# Patient Record
Sex: FEMALE | Race: WHITE | NOT HISPANIC OR LATINO | Employment: PART TIME | URBAN - METROPOLITAN AREA
[De-identification: names, ages, dates, MRNs, and addresses within clinical notes are randomized per-mention and may not be internally consistent; named-entity substitution may affect disease eponyms.]

---

## 2018-01-14 ENCOUNTER — HOSPITAL ENCOUNTER (EMERGENCY)
Facility: CLINIC | Age: 63
Discharge: HOME | End: 2018-01-14
Attending: PHYSICIAN ASSISTANT | Admitting: PHYSICIAN ASSISTANT
Payer: OTHER MISCELLANEOUS

## 2018-01-14 ENCOUNTER — APPOINTMENT (OUTPATIENT)
Dept: GENERAL RADIOLOGY | Facility: CLINIC | Age: 63
End: 2018-01-14
Attending: PHYSICIAN ASSISTANT
Payer: OTHER MISCELLANEOUS

## 2018-01-14 VITALS
OXYGEN SATURATION: 95 % | BODY MASS INDEX: 26.22 KG/M2 | WEIGHT: 148 LBS | HEART RATE: 90 BPM | TEMPERATURE: 98.3 F | SYSTOLIC BLOOD PRESSURE: 170 MMHG | DIASTOLIC BLOOD PRESSURE: 109 MMHG

## 2018-01-14 DIAGNOSIS — S52.181A OTHER CLOSED FRACTURE OF PROXIMAL END OF RIGHT RADIUS, INITIAL ENCOUNTER: ICD-10-CM

## 2018-01-14 PROCEDURE — G0463 HOSPITAL OUTPT CLINIC VISIT: HCPCS | Mod: 25

## 2018-01-14 PROCEDURE — 99213 OFFICE O/P EST LOW 20 MIN: CPT | Mod: 25 | Performed by: PHYSICIAN ASSISTANT

## 2018-01-14 PROCEDURE — 24650 CLTX RDL HEAD/NCK FX WO MNPJ: CPT | Mod: 54 | Performed by: PHYSICIAN ASSISTANT

## 2018-01-14 PROCEDURE — 73070 X-RAY EXAM OF ELBOW: CPT | Mod: RT

## 2018-01-14 PROCEDURE — 24650 CLTX RDL HEAD/NCK FX WO MNPJ: CPT

## 2018-01-14 NOTE — DISCHARGE INSTRUCTIONS
Upper Extremity Fracture    You have a break (fracture) of the arm, wrist, or hand. This may be a small crack in the bone. Or it may be a major break, with the broken parts pushed out of position. Most fractures will heal without surgery. But you may need surgery if the bones are far out of place or if the break is near the elbow. Treatment is with a special sling called a shoulder immobilizer, or a splint or cast, depending on the type of fracture and where the fracture is. This fracture takes 4 to 6 weeks or longer to heal. The cast may need to be changed in 2 to 3 weeks as swelling goes down.  Home care  Follow these guidelines when caring for yourself:    If you were given a shoulder immobilizer, leave it in place. This will support the injured arm at your side. This is the best position for bone healing. The shoulder immobilizer can be adjusted. If it becomes loose, adjust it so that your forearm is level with the ground (horizontal). Your hand should be level with your elbow.    Put an ice pack on the injured area. Do this for 20 minutes every 1 to 2 hours the first day for pain relief. You can make an ice pack by wrapping a plastic bag of ice cubes in a thin towel. As the ice melts, be careful that the cast/splint/sling doesn t get wet. You can put the ice pack inside the sling and directly over the splint or cast. Continue using the ice pack 3 to 4 times a day for the next 2 days. Then use the ice pack as needed to ease pain and swelling.    Keep the cast, splint, or sling completely dry at all times. Bathe with your cast, splint, or sling out of the water. Protect it with a large plastic bag, rubber-banded at the top end. If a fiberglass cast, splint, or sling gets wet, you can dry it with a hair dryer.    You may use acetaminophen or ibuprofen to control pain, unless another pain medicine was prescribed. If you have chronic liver or kidney disease, talk with your healthcare provider before using these  medicines. Also talk with your provider if you ve had a stomach ulcer or gastrointestinal bleeding.    Don t put creams or objects under the cast if you have itching.  Follow-up care  Follow up with your healthcare provider, or as advised. This is to make sure the bone is healing the way it should.  X-rays may be taken. You will be told of any new findings that may affect your care.  When to seek medical advice  Call your health care provider right away if any of these occur:    The cast or splint cracks    The plaster cast or splint becomes wet or soft    The fiberglass cast or splint stays wet for more than 24 hours    Bad odor from the cast or wound fluid stains the cast    Tightness or pain under the cast or splint gets worse    Fingers become swollen, cold, blue, numb, or tingly    You can t move your fingers    Skin around cast or splint becomes red    Fever of 100.4 F (38 C) or higher, or as directed by your healthcare provider  Date Last Reviewed: 2/1/2017 2000-2017 [employer]. [address] [place] [address], [place] [address]. All rights reserved. This information is not intended as a substitute for professional medical care. Always follow your healthcare professional's instructions.

## 2018-01-14 NOTE — ED AVS SNAPSHOT
[facility]    [address] [address]    Phone:  [phone]    Fax:  [phone]                                       Dasia Reynolds   MRN: [id number]    Department:  [facility]   Date of Visit:  1/14/2018           Patient Information     Date Of Birth          1955        Your diagnoses for this visit were:     Other closed fracture of proximal end of right radius, initial encounter        You were seen by Darnell Crowley PA-C.        Discharge Instructions         Upper Extremity Fracture    You have a break (fracture) of the arm, wrist, or hand. This may be a small crack in the bone. Or it may be a major break, with the broken parts pushed out of position. Most fractures will heal without surgery. But you may need surgery if the bones are far out of place or if the break is near the elbow. Treatment is with a special sling called a shoulder immobilizer, or a splint or cast, depending on the type of fracture and where the fracture is. This fracture takes 4 to 6 weeks or longer to heal. The cast may need to be changed in 2 to 3 weeks as swelling goes down.  Home care  Follow these guidelines when caring for yourself:    If you were given a shoulder immobilizer, leave it in place. This will support the injured arm at your side. This is the best position for bone healing. The shoulder immobilizer can be adjusted. If it becomes loose, adjust it so that your forearm is level with the ground (horizontal). Your hand should be level with your elbow.    Put an ice pack on the injured area. Do this for 20 minutes every 1 to 2 hours the first day for pain relief. You can make an ice pack by wrapping a plastic bag of ice cubes in a thin towel. As the ice melts, be careful that the cast/splint/sling doesn t get wet. You can put the ice pack inside the sling and directly over the splint or cast. Continue using the ice pack 3 to 4 times a day for  the next 2 days. Then use the ice pack as needed to ease pain and swelling.    Keep the cast, splint, or sling completely dry at all times. Bathe with your cast, splint, or sling out of the water. Protect it with a large plastic bag, rubber-banded at the top end. If a fiberglass cast, splint, or sling gets wet, you can dry it with a hair dryer.    You may use acetaminophen or ibuprofen to control pain, unless another pain medicine was prescribed. If you have chronic liver or kidney disease, talk with your healthcare provider before using these medicines. Also talk with your provider if you ve had a stomach ulcer or gastrointestinal bleeding.    Don t put creams or objects under the cast if you have itching.  Follow-up care  Follow up with your healthcare provider, or as advised. This is to make sure the bone is healing the way it should.  X-rays may be taken. You will be told of any new findings that may affect your care.  When to seek medical advice  Call your health care provider right away if any of these occur:    The cast or splint cracks    The plaster cast or splint becomes wet or soft    The fiberglass cast or splint stays wet for more than 24 hours    Bad odor from the cast or wound fluid stains the cast    Tightness or pain under the cast or splint gets worse    Fingers become swollen, cold, blue, numb, or tingly    You can t move your fingers    Skin around cast or splint becomes red    Fever of 100.4 F (38 C) or higher, or as directed by your healthcare provider  Date Last Reviewed: 2/1/2017 2000-2017 [employer]. [address] [place] [address]. All rights reserved. This information is not intended as a substitute for professional medical care. Always follow your healthcare professional's instructions.          24 Hour Appointment Hotline       To make an appointment at any [facility], call [phone] ([phone]). If you don't have a family doctor or clinic, we  will help you find one. [facility] are conveniently located to serve the needs of you and your family.          ED Discharge Orders     ORTHO  REFERRAL       [facility] is referring you to the Orthopedic  Services at [facility].       The  Representative will assist you in the coordination of your Orthopedic and Musculoskeletal Care as prescribed by your physician.    The  Representative will call you within 1 business day to help schedule your appointment, or you may contact the  Representative at:    All areas ~ [phone]     Type of Referral : Non Surgical       Timeframe requested: 3 - 5 days    Coverage of these services is subject to the terms and limitations of your health insurance plan.  Please call member services at your health plan with any benefit or coverage questions.      If X-rays, CT or MRI's have been performed, please contact the facility where they were done to arrange for , prior to your scheduled appointment.  Please bring this referral request to your appointment and present it to your specialist.                     Review of your medicines      Our records show that you are taking the medicines listed below. If these are incorrect, please call your family doctor or clinic.        Dose / Directions Last dose taken    albuterol 108 (90 BASE) MCG/ACT Inhaler   Commonly known as:  PROAIR HFA/PROVENTIL HFA/VENTOLIN HFA   Dose:  2 puff   Quantity:  3 Inhaler        Inhale 2 puffs into the lungs every 6 hours as needed for shortness of breath / dyspnea.   Refills:  1        ANTIVERT 25 MG tablet   Dose:  12.5 mg   Quantity:  90 tablet   Generic drug:  meclizine        Take 0.5 tablets (12.5 mg) by mouth daily   Refills:  0        glucosamine complex Tabs        Take  by mouth.   Refills:  0        ipratropium - albuterol 0.5 mg/2.5 mg/3 mL 0.5-2.5 (3) MG/3ML neb solution   Commonly known as:   DUONEB   Dose:  1 ampule   Quantity:  30 vial        Take 3 mLs by nebulization every 6 hours as needed for shortness of breath / dyspnea.   Refills:  1        ipratropium 17 MCG/ACT Inhaler   Commonly known as:  ATROVENT HFA   Dose:  2 puff        Inhale 2 puffs into the lungs 2 times daily   Refills:  0        LORazepam 1 MG tablet   Commonly known as:  ATIVAN   Dose:  0.5 mg   Quantity:  4 tablet        Take 0.5 tablets by mouth. Up to 3 times daily, for torticolis.   Refills:  0        Multi-vitamin Tabs tablet   Dose:  1 tablet   Quantity:  100 tablet   Generic drug:  multivitamin, therapeutic with minerals        Take 1 tablet by mouth daily.   Refills:  3        omeprazole 20 MG tablet   Dose:  20 mg   Quantity:  30 tablet        Take 20 mg by mouth. 1-2 daily   Refills:  1        predniSONE 20 MG tablet   Commonly known as:  DELTASONE   Quantity:  10 tablet        Take two tablets (= 40mg) each day for 5 (five) days   Refills:  0        PREVACID PO        Take by mouth daily   Refills:  0        TYLENOL 325 MG tablet   Generic drug:  acetaminophen        2 TABLETS BY MOUTH EVERY 4 HOURS AS NEEDED, DO NOT EXCEED 4,000 MG OF ACETAMINOPHEN IN 24 HOURS   Refills:  0                Procedures and tests performed during your visit     Elbow XR, 2 views, right      Orders Needing Specimen Collection     None      Pending Results     No orders found from 1/12/2018 to 1/15/2018.            Pending Culture Results     No orders found from 1/12/2018 to 1/15/2018.            Pending Results Instructions     If you had any lab results that were not finalized at the time of your Discharge, you can call the ED Lab Result RN at [phone]. You will be contacted by this team for any positive Lab results or changes in treatment. The nurses are available 7 days a week from 10A to 6:30P.  You can leave a message 24 hours per day and they will return your call.        Test Results From Your Hospital Stay        1/14/2018  3:55  "PM      Narrative     ELBOW RIGHT TWO VIEW   2018 3:44 PM     HISTORY: Elbow pain from fall 4 days ago.     COMPARISON: None.        Impression     IMPRESSION: Fracture at the radial neck is suggested on the frontal  projection that is nondisplaced with some cortical irregularity in  this region. Positive posterior fat pad sign adjacent to the distal  humerus indicating hemarthrosis.    DAVID ATKINSON MD                Thank you for choosing [facility]       Thank you for choosing [facility] for your care. Our goal is always to provide you with excellent care. Hearing back from our patients is one way we can continue to improve our services. Please take a few minutes to complete the written survey that you may receive in the mail after you visit with us. Thank you!        [facility] [employer] Information     [facility] lets you send messages to your doctor, view your test results, renew your prescriptions, schedule appointments and more. To sign up, go to [email or web address] . Click on \"Log in\" on the left side of the screen, which will take you to the Welcome page. Then click on \"Sign up Now\" on the right side of the page.     You will be asked to enter the access code listed below, as well as some personal information. Please follow the directions to create your username and password.     Your access code is: [email or web address]  Expires: 2018  4:12 PM     Your access code will  in 90 days. If you need help or a new code, please call your [facility] clinic or [phone].        Care EveryWhere ID     This is your Care EveryWhere ID. This could be used by other organizations to access your [facility] medical records  [id number]        Equal Access to Services     ABDIAZIZ NOVAK : [facility] [place], waaxda ludawood, qaybta kaaltaylor morrison. So [facility] [phone].    ATENCIÓN: Si habla español, tiene a colorado disposición servicios gratuitos de asistencia lingüística. Llame al " [phone].    We comply with applicable federal civil rights laws and [place] laws. We do not discriminate on the basis of race, color, national origin, age, disability, sex, sexual orientation, or gender identity.            After Visit Summary       This is your record. Keep this with you and show to your community pharmacist(s) and doctor(s) at your next visit.

## 2018-01-14 NOTE — ED AVS SNAPSHOT
[facility]    [address] [address]    Phone:  [phone]    Fax:  [phone]                                       Dasia Reynolds   MRN: [id number]    Department:  [facility]   Date of Visit:  1/14/2018           After Visit Summary Signature Page     I have received my discharge instructions, and my questions have been answered. I have discussed any challenges I see with this plan with the nurse or doctor.    ..........................................................................................................................................  Patient/Patient Representative Signature      ..........................................................................................................................................  Patient Representative Print Name and Relationship to Patient    ..................................................               ................................................  Date                                            Time    ..........................................................................................................................................  Reviewed by Signature/Title    ...................................................              ..............................................  Date                                                            Time

## 2018-01-14 NOTE — ED PROVIDER NOTES
Chief Complaint:    Chief Complaint   Patient presents with     Arm Pain     fell on right elbow at work on wednesday       HPI: Dasia Reynolds is an 62 year old female who presents for evaluation and treatment of R elbow injury.  Patient fell on the elbow 4 days ago at work on 1/10/2018.  She had immediate pain and swelling.  She states that there was bruising, but that has resolved.  She has been icing the elbow, and taking ibuprofen and this has helped.  The pain is worse with movement.  She has not had any numbness or tingling in the R arm or hand.  She has not injured this elbow in the past.  She did not hit her head or lose consciousness.      ROS:  Further problem focused system review was otherwise negative.        Surgical History:  Past Surgical History:   Procedure Laterality Date     CHOLECYSTECTOMY  2008        Problem List:  Patient Active Problem List   Diagnosis     Advanced directives, counseling/discussion        Allergies:  Allergies   Allergen Reactions     Augmentin      Vertigo/vomiting     Morphine Itching     Sulfa Drugs      Headache          Current Meds:  No current facility-administered medications for this encounter.     Current Outpatient Prescriptions:      predniSONE (DELTASONE) 20 MG tablet, Take two tablets (= 40mg) each day for 5 (five) days, Disp: 10 tablet, Rfl: 0     ipratropium (ATROVENT HFA) 17 MCG/ACT inhaler, Inhale 2 puffs into the lungs 2 times daily, Disp: , Rfl:      Lansoprazole (PREVACID PO), Take by mouth daily, Disp: , Rfl:      meclizine (ANTIVERT) 25 MG tablet, Take 0.5 tablets (12.5 mg) by mouth daily, Disp: 90 tablet, Rfl:      ipratropium - albuterol 0.5 mg/2.5 mg/3 mL (DUONEB) 0.5-2.5 (3) MG/3ML nebulization, Take 3 mLs by nebulization every 6 hours as needed for shortness of breath / dyspnea., Disp: 30 vial, Rfl: 1     LORazepam (ATIVAN) 1 MG tablet, Take 0.5 tablets by mouth. Up to 3 times daily, for torticolis., Disp: 4 tablet, Rfl: 0     omeprazole 20 MG  tablet, Take 20 mg by mouth. 1-2 daily, Disp: 30 tablet, Rfl: 1     albuterol (PROVENTIL HFA: VENTOLIN HFA) 108 (90 BASE) MCG/ACT inhaler, Inhale 2 puffs into the lungs every 6 hours as needed for shortness of breath / dyspnea., Disp: 3 Inhaler, Rfl: 1     Multiple Vitamin (MULTI-VITAMIN) per tablet, Take 1 tablet by mouth daily., Disp: 100 tablet, Rfl: 3     Nutritional Supplements (GLUCOSAMINE COMPLEX) TABS, Take  by mouth., Disp: , Rfl:      TYLENOL 325 MG OR TABS, 2 TABLETS BY MOUTH EVERY 4 HOURS AS NEEDED, DO NOT EXCEED 4,000 MG OF ACETAMINOPHEN IN 24 HOURS, Disp: , Rfl:      PHYSICAL EXAM:     Vital signs noted and reviewed by Darnell Crowley  BP (!) 170/109  Pulse 90  Temp 98.3  F (36.8  C) (Oral)  Wt 67.1 kg (148 lb)  LMP 09/11/2001  SpO2 95%  BMI 26.22 kg/m2     PEFR:  General appearance: healthy, alert and no distress  Ears: R TM - normal: no effusions, no erythema, and normal landmarks, L TM - normal: no effusions, no erythema, and normal landmarks  Eyes: R normal, L normal  Nose: normal  Oropharynx: normal  Neck: supple and no adenopathy  Lungs: normal and clear to auscultation  Heart: S1, S2 normal, no murmur, click, rub or gallop, regular rate and rhythm, chest is clear without rales or wheezing, no pedal edema, no JVD, no hepatosplenomegaly  Extremities: R elbow - No deformity or ecchymosis.  Mild swelling.  Mild pain with palpation of medial and lateral aspect.  Decreaded flexion and extension.  Pain with pronation and supination in the elbow.  Full range of motion of wrist and all digits.  Digits are neurologically intact.       Labs:     Results for orders placed or performed during the hospital encounter of 01/14/18   Elbow XR, 2 views, right    Narrative    ELBOW RIGHT TWO VIEW   1/14/2018 3:44 PM     HISTORY: Elbow pain from fall 4 days ago.     COMPARISON: None.      Impression    IMPRESSION: Fracture at the radial neck is suggested on the frontal  projection that is nondisplaced with  some cortical irregularity in  this region. Positive posterior fat pad sign adjacent to the distal  humerus indicating hemarthrosis.    DAVID ATKINSON MD          ASSESSMENT:     1. Other closed fracture of proximal end of right radius, initial encounter           PLAN:     Patient placed in posterior splint constructed from [employer] in clinic today.  Neuro exam was normal after splint placement.  Cap refill less than 2 seconds.  She was placed in a sling for comfort.  She was instructed no use of the R arm until she follows up with Sports Medicine.  Order placed for this today.  Paperwork for work comp filled out.  Patient verbalized understanding and agreed with this plan.  Greater than 25 minutes was spent in the coordination of care including paperwork, as well as consultation, examination, and splinting of the patient.     Darnell Crowley  1/14/2018, 3:27 PM       Darnell Crowley PA-C  01/14/18 [id number]

## 2018-01-15 ENCOUNTER — OFFICE VISIT (OUTPATIENT)
Dept: ORTHOPEDICS | Facility: CLINIC | Age: 63
End: 2018-01-15
Payer: OTHER MISCELLANEOUS

## 2018-01-15 VITALS
SYSTOLIC BLOOD PRESSURE: 137 MMHG | WEIGHT: 148 LBS | HEART RATE: 78 BPM | BODY MASS INDEX: 26.22 KG/M2 | DIASTOLIC BLOOD PRESSURE: 92 MMHG | HEIGHT: 63 IN

## 2018-01-15 DIAGNOSIS — S52.134A CLOSED NONDISPLACED FRACTURE OF NECK OF RIGHT RADIUS, INITIAL ENCOUNTER: Primary | ICD-10-CM

## 2018-01-15 PROCEDURE — 99203 OFFICE O/P NEW LOW 30 MIN: CPT | Performed by: PEDIATRICS

## 2018-01-15 NOTE — LETTER
January 15, 2018      Dasia Reynolds  [address] [address]  [place] [address]        To Whom It May Concern:    Dasia Reynolds was seen in our clinic today 1/15/18 for a right elbow injury. She may return to work with the following restrictions: no lifting or weight bearing with right arm. She will follow-up in clinic in about 1 week for re-evaluation.      Sincerely,          Shila Craig MD

## 2018-01-15 NOTE — PROGRESS NOTES
"Sports Medicine Clinic Visit    PCP: Lakisha Flanagan    Dasia Reynolds is a 62 year old female who is seen  as an ER referral presenting with right elbow injury.    Injury: Patient reports an injury ~ 5 days ago, 1/10/17, she slipped on ice and hit elbow when she landed.  Had significant swelling, bruising and loss of motion.  Went to ED yesterday where fracture was diagnosed, has been splinted since then.    Location of Pain: forearm, lateral elbow  Duration of Pain: 5 day(s)  Rating of Pain at worst: 10/10  Rating of Pain Currently: 5/10  Symptoms are better with: immobilization, ibuprofen  Symptoms are worse with: jarring motions  Additional Features:   Positive: swelling and bruising   Negative: popping, grinding, catching, locking, instability, paresthesias, numbness, weakness, pain in other joints and systemic symptoms  Other evaluation and/or treatments so far consists of: x-rays at ER  Prior History of related problems: none    Social History: works in assisted living    Review of Systems  Skin: mild bruising, mild swelling  Musculoskeletal: as above  Neurologic: no numbness, paresthesias  Remainder of review of systems is negative including constitutional, CV, pulmonary, GI, except as noted in HPI or medical history.    Patient's current problem list, past medical and surgical history, and family history were reviewed.    Patient Active Problem List   Diagnosis     Advanced directives, counseling/discussion     History reviewed. No pertinent past medical history.  Past Surgical History:   Procedure Laterality Date     CHOLECYSTECTOMY  2008     Family History   Problem Relation Age of Onset     Respiratory Father      emphysema     Alzheimer Disease Maternal Grandmother      CANCER Maternal Grandfather      pancreatic cancer     CANCER Paternal Grandfather      lung     GASTROINTESTINAL DISEASE Sister      diverticulis     Alzheimer Disease Mother        Objective  BP (!) 137/92  Pulse 78  Ht 5' 3\" (1.6 " m)  Wt 148 lb (67.1 kg)  LMP 09/11/2001  BMI 26.22 kg/m2    GENERAL APPEARANCE: healthy, alert and no distress   GAIT: NORMAL  SKIN: no suspicious lesions or rashes  HEENT: Sclera clear, anicteric  CV: good peripheral pulses  RESP: Breathing not labored  NEURO: Normal strength and tone, mentation intact and speech normal  PSYCH:  mentation appears normal and affect normal/bright    Bilateral Elbow/arm exam:    Inspection:     no ecchymosis       no edema or effusion    Tender:     radial head right and mild over medial and lateral epicondyle    Non-Tender:      remainder of the elbow bilaterally and right wrist    ROM:      flexion 90 right       extension 120 right       forearm supination minimal right       forearm pronation minimal right    Strength:     pain with resisted strength testing right    Sensation:     intact throughout hand       intact throughout forearm    Radiology  I visualized and reviewed these images with the patient  ELBOW RIGHT TWO VIEW   1/14/2018 3:44 PM   HISTORY: Elbow pain from fall 4 days ago.   COMPARISON: None.  IMPRESSION: Fracture at the radial neck is suggested on the frontal  projection that is nondisplaced with some cortical irregularity in  this region. Positive posterior fat pad sign adjacent to the distal  humerus indicating hemarthrosis.    Assessment:  1. Closed nondisplaced fracture of neck of right radius, initial encounter      Nondisplaced proximal radial neck fracture.  Reviewed images and discussed diagnosis. Given nondisplaced fracture and stable injury, I recommend a brief period of immobilization in a splint and/or sling for 1-2 weeks followed by gentle range of motion as pain allows.  Follow up in 1 weeks for repeat evaluation and x-rays.  Discussed that elbow stiffness, particularly in extension, may persist following this injury.  Discussed limiting lifting and weightbearing with this arm for likely 3 months following injury.  Discussed potential for  occupational therapy if needed in the future to help with motion and strength.    Plan:  - Today's Plan of Care:  Splint and sling for comfort  Early range of motion OK  No lifting or weight bearing with arm  Letter for work    Follow Up: 1 week with xray    Concerning signs and symptoms were reviewed.  The patient expressed understanding of this management plan and all questions were answered at this time.    Shila Craig MD CAQ  Primary Care Sports Medicine  [facility]

## 2018-01-15 NOTE — MR AVS SNAPSHOT
"              After Visit Summary   1/15/2018    Dasia Reynolds    MRN: [id number]           Patient Information     Date Of Birth          1955        Visit Information        Provider Department      1/15/2018 1:00 PM Shila Craig MD [facility]        Today's Diagnoses     Closed nondisplaced fracture of neck of right radius, initial encounter    -  1      Care Instructions    Plan:  - Today's Plan of Care:  Splint and sling for comfort  Early range of motion OK  No lifting or weight bearing with arm  Letter for work    Follow Up: 1 week with xray              Follow-ups after your visit        Who to contact     If you have questions or need follow up information about today's clinic visit or your schedule please contact [facility] directly at [phone].  Normal or non-critical lab and imaging results will be communicated to you by MyChart, letter or phone within 4 business days after the clinic has received the results. If you do not hear from us within 7 days, please contact the clinic through [employer] or phone. If you have a critical or abnormal lab result, we will notify you by phone as soon as possible.  Submit refill requests through [employer] or call your pharmacy and they will forward the refill request to us. Please allow 3 business days for your refill to be completed.          Additional Information About Your Visit        [employer] Information     [employer] lets you send messages to your doctor, view your test results, renew your prescriptions, schedule appointments and more. To sign up, go to [email or web address] . Click on \"Log in\" on the left side of the screen, which will take you to the Welcome page. Then click on \"Sign up Now\" on the right side of the page.     You will be asked to enter the access code listed below, as well as some personal information. Please follow the directions to create your username and password.   " "  Your access code is: [email or web address]  Expires: 2018  4:12 PM     Your access code will  in 90 days. If you need help or a new code, please call your [facility] clinic or [phone].        Care EveryWhere ID     This is your Care EveryWhere ID. This could be used by other organizations to access your [facility] medical records  [id number]        Your Vitals Were     Pulse Height Last Period BMI (Body Mass Index)          78 5' 3\" (1.6 m) 2001 26.22 kg/m2         Blood Pressure from Last 3 Encounters:   01/15/18 (!) 137/92   18 (!) 170/109   08/18/15 (!) 142/94    Weight from Last 3 Encounters:   01/15/18 148 lb (67.1 kg)   18 148 lb (67.1 kg)   06/23/15 147 lb (66.7 kg)              Today, you had the following     No orders found for display       Primary Care Provider Office Phone # Fax #    Lakisha Flanagan [phone]       [facility] [address] [place] [address]        Equal Access to Services     [facility] AH: Hadii aad ku hadasho Solenoraali, waaxda luqadaha, qaybta kaalmada adeegyada, waxay lacie haydavidn manuel deluna la'davidn ah. So [facility] [phone].    ATENCIÓN: Si habla español, tiene a colorado disposición servicios gratuitos de asistencia lingüística. [facility] [phone].    We comply with applicable federal civil rights laws and [place] laws. We do not discriminate on the basis of race, color, national origin, age, disability, sex, sexual orientation, or gender identity.            Thank you!     Thank you for choosing [facility]  for your [facility]. Our goal is always to provide you with excellent care. Hearing back from our patients is one way we can continue to improve our services. Please take a few minutes to complete the written survey that you may receive in the mail after your visit with us. Thank you!             Your Updated Medication List - Protect others around you: Learn how to safely use, store and throw away " your medicines at www.disposemymeds.org.          This list is accurate as of: 1/15/18  1:36 PM.  Always use your most recent med list.                   Brand Name Dispense Instructions for use Diagnosis    albuterol 108 (90 BASE) MCG/ACT Inhaler    PROAIR HFA/PROVENTIL HFA/VENTOLIN HFA    3 Inhaler    Inhale 2 puffs into the lungs every 6 hours as needed for shortness of breath / dyspnea.        ANTIVERT 25 MG tablet   Generic drug:  meclizine     90 tablet    Take 0.5 tablets (12.5 mg) by mouth daily        glucosamine complex Tabs      Take  by mouth.        ipratropium - albuterol 0.5 mg/2.5 mg/3 mL 0.5-2.5 (3) MG/3ML neb solution    DUONEB    30 vial    Take 3 mLs by nebulization every 6 hours as needed for shortness of breath / dyspnea.        ipratropium 17 MCG/ACT Inhaler    ATROVENT HFA     Inhale 2 puffs into the lungs 2 times daily        LORazepam 1 MG tablet    ATIVAN    4 tablet    Take 0.5 tablets by mouth. Up to 3 times daily, for torticolis.        Multi-vitamin Tabs tablet   Generic drug:  multivitamin, therapeutic with minerals     100 tablet    Take 1 tablet by mouth daily.        omeprazole 20 MG tablet     30 tablet    Take 20 mg by mouth. 1-2 daily        predniSONE 20 MG tablet    DELTASONE    10 tablet    Take two tablets (= 40mg) each day for 5 (five) days        PREVACID PO      Take by mouth daily        TYLENOL 325 MG tablet   Generic drug:  acetaminophen      2 TABLETS BY MOUTH EVERY 4 HOURS AS NEEDED, DO NOT EXCEED 4,000 MG OF ACETAMINOPHEN IN 24 HOURS

## 2018-01-15 NOTE — LETTER
1/15/2018         RE: Dasia Reynolds  [address]  [place] [address]        Dear Colleague,    Thank you for referring your patient, Dasia Reynolds, to the [facility] [place]. Please see a copy of my visit note below.    Sports Medicine Clinic Visit    PCP: Lakisha Flanagan    Dasia Reynolds is a 62 year old female who is seen  as an ER referral presenting with right elbow injury.    Injury: Patient reports an injury ~ 5 days ago, 1/10/17, she slipped on ice and hit elbow when she landed.  Had significant swelling, bruising and loss of motion.  Went to ED yesterday where fracture was diagnosed, has been splinted since then.    Location of Pain: forearm, lateral elbow  Duration of Pain: 5 day(s)  Rating of Pain at worst: 10/10  Rating of Pain Currently: 5/10  Symptoms are better with: immobilization, ibuprofen  Symptoms are worse with: jarring motions  Additional Features:   Positive: swelling and bruising   Negative: popping, grinding, catching, locking, instability, paresthesias, numbness, weakness, pain in other joints and systemic symptoms  Other evaluation and/or treatments so far consists of: x-rays at ER  Prior History of related problems: none    Social History: works in assisted living    Review of Systems  Skin: mild bruising, mild swelling  Musculoskeletal: as above  Neurologic: no numbness, paresthesias  Remainder of review of systems is negative including constitutional, CV, pulmonary, GI, except as noted in HPI or medical history.    Patient's current problem list, past medical and surgical history, and family history were reviewed.    Patient Active Problem List   Diagnosis     Advanced directives, counseling/discussion     History reviewed. No pertinent past medical history.  Past Surgical History:   Procedure Laterality Date     CHOLECYSTECTOMY  2008     Family History   Problem Relation Age of Onset     Respiratory Father      emphysema     Alzheimer Disease  "Maternal Grandmother      CANCER Maternal Grandfather      pancreatic cancer     CANCER Paternal Grandfather      lung     GASTROINTESTINAL DISEASE Sister      diverticulis     Alzheimer Disease Mother        Objective  BP (!) 137/92  Pulse 78  Ht 5' 3\" (1.6 m)  Wt 148 lb (67.1 kg)  LMP 09/11/2001  BMI 26.22 kg/m2    GENERAL APPEARANCE: healthy, alert and no distress   GAIT: NORMAL  SKIN: no suspicious lesions or rashes  HEENT: Sclera clear, anicteric  CV: good peripheral pulses  RESP: Breathing not labored  NEURO: Normal strength and tone, mentation intact and speech normal  PSYCH:  mentation appears normal and affect normal/bright    Bilateral Elbow/arm exam:    Inspection:     no ecchymosis       no edema or effusion    Tender:     radial head right and mild over medial and lateral epicondyle    Non-Tender:      remainder of the elbow bilaterally and right wrist    ROM:      flexion 90 right       extension 120 right       forearm supination minimal right       forearm pronation minimal right    Strength:     pain with resisted strength testing right    Sensation:     intact throughout hand       intact throughout forearm    Radiology  I visualized and reviewed these images with the patient  ELBOW RIGHT TWO VIEW   1/14/2018 3:44 PM   HISTORY: Elbow pain from fall 4 days ago.   COMPARISON: None.  IMPRESSION: Fracture at the radial neck is suggested on the frontal  projection that is nondisplaced with some cortical irregularity in  this region. Positive posterior fat pad sign adjacent to the distal  humerus indicating hemarthrosis.    Assessment:  1. Closed nondisplaced fracture of neck of right radius, initial encounter      Nondisplaced proximal radial neck fracture.  Reviewed images and discussed diagnosis. Given nondisplaced fracture and stable injury, I recommend a brief period of immobilization in a splint and/or sling for 1-2 weeks followed by gentle range of motion as pain allows.  Follow up in 1 weeks " for repeat evaluation and x-rays.  Discussed that elbow stiffness, particularly in extension, may persist following this injury.  Discussed limiting lifting and weightbearing with this arm for likely 3 months following injury.  Discussed potential for occupational therapy if needed in the future to help with motion and strength.    Plan:  - Today's Plan of Care:  Splint and sling for comfort  Early range of motion OK  No lifting or weight bearing with arm  Letter for work    Follow Up: 1 week with xray    Concerning signs and symptoms were reviewed.  The patient expressed understanding of this management plan and all questions were answered at this time.    Shila Craig MD CAQ  Primary Care Sports Medicine  [facility]    Again, thank you for allowing me to participate in the care of your patient.        Sincerely,        Shila Craig MD

## 2018-01-15 NOTE — PATIENT INSTRUCTIONS
Plan:  - Today's Plan of Care:  Splint and sling for comfort  Early range of motion OK  No lifting or weight bearing with arm  Letter for work    Follow Up: 1 week with xray

## 2018-01-24 ENCOUNTER — OFFICE VISIT (OUTPATIENT)
Dept: ORTHOPEDICS | Facility: CLINIC | Age: 63
End: 2018-01-24
Payer: OTHER MISCELLANEOUS

## 2018-01-24 ENCOUNTER — RADIANT APPOINTMENT (OUTPATIENT)
Dept: GENERAL RADIOLOGY | Facility: CLINIC | Age: 63
End: 2018-01-24
Attending: PEDIATRICS
Payer: OTHER MISCELLANEOUS

## 2018-01-24 VITALS
WEIGHT: 148 LBS | SYSTOLIC BLOOD PRESSURE: 132 MMHG | BODY MASS INDEX: 26.22 KG/M2 | HEIGHT: 63 IN | DIASTOLIC BLOOD PRESSURE: 82 MMHG

## 2018-01-24 DIAGNOSIS — S52.134D CLOSED NONDISPLACED FRACTURE OF NECK OF RIGHT RADIUS WITH ROUTINE HEALING, SUBSEQUENT ENCOUNTER: ICD-10-CM

## 2018-01-24 DIAGNOSIS — S52.134D CLOSED NONDISPLACED FRACTURE OF NECK OF RIGHT RADIUS WITH ROUTINE HEALING, SUBSEQUENT ENCOUNTER: Primary | ICD-10-CM

## 2018-01-24 PROCEDURE — 99213 OFFICE O/P EST LOW 20 MIN: CPT | Performed by: PEDIATRICS

## 2018-01-24 PROCEDURE — 73080 X-RAY EXAM OF ELBOW: CPT | Mod: RT

## 2018-01-24 NOTE — PROGRESS NOTES
"Sports Medicine Clinic Visit - Interim History January 24, 2018      PCP: Lakisha Flanagan    Dasia Reynolds is a 62 year old female who is seen in f/u up for Closed nondisplaced fracture of neck of right radius with routine healing, subsequent encounter. Since last visit on 1/15/18, patient reports the right elbow and forearm are feeling better, however, still very sore.  - Now ~ 2 weeks from initial injury    Social History: works at assisted living    Review of Systems  Skin: no bruising, initial swelling  Musculoskeletal: as above  Neurologic: no numbness, paresthesias  Remainder of review of systems is negative including constitutional, CV, pulmonary, GI, except as noted in HPI or medical history.    Patient's current problem list, past medical and surgical history, and family history were reviewed.    Patient Active Problem List   Diagnosis     Advanced directives, counseling/discussion     No past medical history on file.  Past Surgical History:   Procedure Laterality Date     CHOLECYSTECTOMY  2008     Family History   Problem Relation Age of Onset     Respiratory Father      emphysema     Alzheimer Disease Maternal Grandmother      CANCER Maternal Grandfather      pancreatic cancer     CANCER Paternal Grandfather      lung     GASTROINTESTINAL DISEASE Sister      diverticulis     Alzheimer Disease Mother        Objective  /82 (BP Location: Left arm, Patient Position: Sitting, Cuff Size: Adult Regular)  Ht 5' 3\" (1.6 m)  Wt 148 lb (67.1 kg)  LMP 09/11/2001  BMI 26.22 kg/m2    GENERAL APPEARANCE: healthy, alert and no distress   GAIT: NORMAL  SKIN: no suspicious lesions or rashes  HEENT: Sclera clear, anicteric  CV: good peripheral pulses  RESP: Breathing not labored  NEURO: Normal strength and tone, mentation intact and speech normal  PSYCH:  mentation appears normal and affect normal/bright    Bilateral Elbow/arm exam:  Inspection:     no ecchymosis       no edema or effusion     Tender:     " radial head right and mild over medial and lateral epicondyle     Non-Tender:      remainder of the elbow bilaterally and right wrist     ROM:      flexion 120 right       extension 15 right       forearm supination slightly limited right       forearm pronation slightly limited right     Strength:     pain with resisted strength testing right, though 3/5 throughout     Sensation:     intact throughout hand       intact throughout forearm    Radiology  I ordered, visualized and reviewed these images with the patient  RIGHT ELBOW THREE OR MORE VIEWS   1/24/2018 3:53 PM   HISTORY:  Closed nondisplaced fracture of neck of right radius with  routine healing, subsequent encounter.  COMPARISON: 1/14/2018.  IMPRESSION: Fracture at the radial neck again identified, appears  stable in alignment. Smaller posterior fat pad sign on the lateral  view adjacent to the distal humerus.    Assessment:  1. Closed nondisplaced fracture of neck of right radius with routine healing, subsequent encounter      Nondisplaced proximal radial neck fracture.  Reviewed images and discussed diagnosis. Given nondisplaced fracture and stable injury, I recommend weaning sling and continuing gentle ROM.  Follow up in 1 month for repeat evaluation and x-rays.  Discussed that elbow stiffness, particularly in extension, may persist following this injury.  Discussed limiting lifting and weightbearing with this arm for likely 3 months following injury.  Discussed potential for occupational therapy if needed in the future to help with motion and strength.    Plan:  - Today's Plan of Care:  Over the counter medication: Acetaminophen (Tylenol) 1000mg every 6 hours with food (Maximum of 3000mg/day)  Work Restrictions  Wean sling, range of motion as able  Limit lifting and weightbearing on arm    -We also discussed other future treatment options:  Occupational therapy    Follow Up: 1 month with re-xray    Concerning signs and symptoms were reviewed.  The  patient expressed understanding of this management plan and all questions were answered at this time.    Shila Craig MD CAQ  Primary Care Sports Medicine  [facility]

## 2018-01-24 NOTE — MR AVS SNAPSHOT
"              After Visit Summary   1/24/2018    Dasia Reynolds    MRN: [id number]           Patient Information     Date Of Birth          1955        Visit Information        Provider Department      1/24/2018 3:40 PM Shila Craig MD [facility]        Today's Diagnoses     Closed nondisplaced fracture of neck of right radius with routine healing, subsequent encounter    -  1      Care Instructions    Plan:  - Today's Plan of Care:  Over the counter medication: Acetaminophen (Tylenol) 1000mg every 6 hours with food (Maximum of 3000mg/day)  Work Restrictions  Wean sling, range of motion as able  Limit lifting and weightbearing on arm    -We also discussed other future treatment options:  Occupational therapy    Follow Up: 1 month with re-xray              Follow-ups after your visit        Who to contact     If you have questions or need follow up information about today's clinic visit or your schedule please contact [facility] directly at [phone].  Normal or non-critical lab and imaging results will be communicated to you by IO.comhart, letter or phone within 4 business days after the clinic has received the results. If you do not hear from us within 7 days, please contact the clinic through Glasses[employer]Globalt or phone. If you have a critical or abnormal lab result, we will notify you by phone as soon as possible.  Submit refill requests through [employer] or call your pharmacy and they will forward the refill request to us. Please allow 3 business days for your refill to be completed.          Additional Information About Your Visit        IO.comhart Information     [employer] lets you send messages to your doctor, view your test results, renew your prescriptions, schedule appointments and more. To sign up, go to [email or web address] . Click on \"Log in\" on the left side of the screen, which will take you to the Welcome page. Then click on \"Sign up Now\" on " "the right side of the page.     You will be asked to enter the access code listed below, as well as some personal information. Please follow the directions to create your username and password.     Your access code is: [email or web address]  Expires: 2018  4:12 PM     Your access code will  in 90 days. If you need help or a new code, please call your [place] clinic or [phone].        Care EveryWhere ID     This is your Care EveryWhere ID. This could be used by other organizations to access your [place] medical records  [id number]        Your Vitals Were     Height Last Period BMI (Body Mass Index)             5' 3\" (1.6 m) 2001 26.22 kg/m2          Blood Pressure from Last 3 Encounters:   18 132/82   01/15/18 (!) 137/92   18 (!) 170/109    Weight from Last 3 Encounters:   18 148 lb (67.1 kg)   01/15/18 148 lb (67.1 kg)   18 148 lb (67.1 kg)               Primary Care Provider Office Phone # Fax #    Lakisha BARNETT Masha [phone]       [facility] [address] [place] [facility] [address]        Equal Access to Services     ABDIAZIZ NOVAK AH: Hadii dell delgadoo Solenoraali, waaxda luqadaha, qaybta kaalmada adeegyada, waxay idiin haypetey breaux. So [facility] [phone].    ATENCIÓN: Si habla español, tiene a colorado disposición servicios gratuitos de asistencia lingüística. Llame al [phone].    We comply with applicable federal civil rights laws and [place] laws. We do not discriminate on the basis of race, color, national origin, age, disability, sex, sexual orientation, or gender identity.            Thank you!     Thank you for choosing [place] [facility]  for your care. Our goal is always to provide you with excellent care. Hearing back from our patients is one way we can continue to improve our services. Please take a few minutes to complete the written survey that you may receive in the mail after your visit with us. " Thank you!             Your Updated Medication List - Protect others around you: Learn how to safely use, store and throw away your medicines at www.disposemymeds.org.          This list is accurate as of 1/24/18  4:12 PM.  Always use your most recent med list.                   Brand Name Dispense Instructions for use Diagnosis    albuterol 108 (90 BASE) MCG/ACT Inhaler    PROAIR HFA/PROVENTIL HFA/VENTOLIN HFA    3 Inhaler    Inhale 2 puffs into the lungs every 6 hours as needed for shortness of breath / dyspnea.        ANTIVERT 25 MG tablet   Generic drug:  meclizine     90 tablet    Take 0.5 tablets (12.5 mg) by mouth daily        glucosamine complex Tabs      Take  by mouth.        ipratropium - albuterol 0.5 mg/2.5 mg/3 mL 0.5-2.5 (3) MG/3ML neb solution    DUONEB    30 vial    Take 3 mLs by nebulization every 6 hours as needed for shortness of breath / dyspnea.        ipratropium 17 MCG/ACT Inhaler    ATROVENT HFA     Inhale 2 puffs into the lungs 2 times daily        LORazepam 1 MG tablet    ATIVAN    4 tablet    Take 0.5 tablets by mouth. Up to 3 times daily, for torticolis.        Multi-vitamin Tabs tablet   Generic drug:  multivitamin, therapeutic with minerals     100 tablet    Take 1 tablet by mouth daily.        omeprazole 20 MG tablet     30 tablet    Take 20 mg by mouth. 1-2 daily        predniSONE 20 MG tablet    DELTASONE    10 tablet    Take two tablets (= 40mg) each day for 5 (five) days        PREVACID PO      Take by mouth daily        TYLENOL 325 MG tablet   Generic drug:  acetaminophen      2 TABLETS BY MOUTH EVERY 4 HOURS AS NEEDED, DO NOT EXCEED 4,000 MG OF ACETAMINOPHEN IN 24 HOURS

## 2018-01-24 NOTE — PATIENT INSTRUCTIONS
Plan:  - Today's Plan of Care:  Over the counter medication: Acetaminophen (Tylenol) 1000mg every 6 hours with food (Maximum of 3000mg/day)  Work Restrictions  Wean sling, range of motion as able  Limit lifting and weightbearing on arm    -We also discussed other future treatment options:  Occupational therapy    Follow Up: 1 month with re-xray

## 2018-01-24 NOTE — LETTER
January 24, 2018      Dasia Reynolds  [address]  [place] [address]           To Whom It May Concern:     Dasia Reynolds was seen in our clinic today 1/15/18 for a right elbow injury. She may return to work with the following restrictions: no lifting or weight bearing with right arm. She will follow-up in clinic in about 4 weeks for re-evaluation.  - Anticipate 4-6 weeks for healing.  - May be limited in lifting and weight bearing for up to 3 months post injury.        Sincerely,        Shila Craig MD

## 2018-01-24 NOTE — LETTER
January 24, 2018      Dasia Reynolds  [address]  [place] [address]           To Whom It May Concern:     Dasia Reynolds was seen in our clinic today 1/24/18 for a right elbow injury. She may return to work with the following restrictions: no lifting or weight bearing with right arm. She will follow-up in clinic in about 4 weeks for re-evaluation.  - Anticipate 4-6 weeks for healing.  - May be limited in lifting and weight bearing for up to 3 months post injury.        Sincerely,        Shila Craig MD

## 2018-02-21 ENCOUNTER — RADIANT APPOINTMENT (OUTPATIENT)
Dept: GENERAL RADIOLOGY | Facility: CLINIC | Age: 63
End: 2018-02-21
Attending: PEDIATRICS
Payer: OTHER MISCELLANEOUS

## 2018-02-21 ENCOUNTER — OFFICE VISIT (OUTPATIENT)
Dept: ORTHOPEDICS | Facility: CLINIC | Age: 63
End: 2018-02-21
Payer: OTHER MISCELLANEOUS

## 2018-02-21 VITALS
WEIGHT: 148 LBS | HEIGHT: 63 IN | BODY MASS INDEX: 26.22 KG/M2 | DIASTOLIC BLOOD PRESSURE: 84 MMHG | SYSTOLIC BLOOD PRESSURE: 138 MMHG

## 2018-02-21 DIAGNOSIS — S52.134D: Primary | ICD-10-CM

## 2018-02-21 DIAGNOSIS — S52.134D: ICD-10-CM

## 2018-02-21 PROCEDURE — 73080 X-RAY EXAM OF ELBOW: CPT | Mod: RT

## 2018-02-21 PROCEDURE — 99213 OFFICE O/P EST LOW 20 MIN: CPT | Performed by: PEDIATRICS

## 2018-02-21 NOTE — PATIENT INSTRUCTIONS
Plan:  - Today's Plan of Care:  Work Restrictions  Rehab: Occupational Therapy: [facility] - [phone]  Continuing to limit lifting and weightbearing with left arm    Follow Up: 6 weeks with re-xray

## 2018-02-21 NOTE — LETTER
"    2/21/2018         RE: Dasia Reynolds  [address]  [place] [address]        Dear Colleague,    Thank you for referring your patient, Dasia Reynolds, to the [facility] [facility] [place]. Please see a copy of my visit note below.    Sports Medicine Clinic Visit - Interim History February 21, 2018      PCP: Lakisha Flanagan    Dasia Reynolds is a 62 year old female who is seen in f/u up for Closed nondisp fracture of neck of right radius with routine healing. Since last visit on 1/24/18 patient has improved overall but still having pain with wrist rotation.  - Now ~ 6 weeks from initial injury    Social History: works at assisted living    Review of Systems  Skin: no bruising, no swelling  Musculoskeletal: as above  Neurologic: no numbness, paresthesias  Remainder of review of systems is negative including constitutional, CV, pulmonary, GI, except as noted in HPI or medical history.    Patient's current problem list, past medical and surgical history, and family history were reviewed.    Patient Active Problem List   Diagnosis     Advanced directives, counseling/discussion     No past medical history on file.  Past Surgical History:   Procedure Laterality Date     CHOLECYSTECTOMY  2008     Family History   Problem Relation Age of Onset     Respiratory Father      emphysema     Alzheimer Disease Maternal Grandmother      CANCER Maternal Grandfather      pancreatic cancer     CANCER Paternal Grandfather      lung     GASTROINTESTINAL DISEASE Sister      diverticulis     Alzheimer Disease Mother        Objective  /84 (BP Location: Right arm, Patient Position: Sitting, Cuff Size: Adult Regular)  Ht 5' 3\" (1.6 m)  Wt 148 lb (67.1 kg)  LMP 09/11/2001  BMI 26.22 kg/m2    GENERAL APPEARANCE: healthy, alert and no distress   GAIT: NORMAL  SKIN: no suspicious lesions or rashes  HEENT: Sclera clear, anicteric  CV: good peripheral pulses  RESP: Breathing not labored  NEURO: Normal strength " and tone, mentation intact and speech normal  PSYCH:  mentation appears normal and affect normal/bright    Bilateral Elbow/arm exam:  Inspection:     no ecchymosis       no edema or effusion      Tender:     radial head right mild      Non-Tender:      remainder of the elbow bilaterally and right wrist      ROM:      flexion 120 right       extension 5 right       forearm supination slightly limited right       forearm pronation slightly limited right      Strength:     pain with resisted strength testing right, though 4/5 throughout      Sensation:     intact throughout hand       intact throughout forearm    Radiology  I ordered, visualized and reviewed these images with the patient  RIGHT ELBOW THREE OR MORE VIEWS  2/21/2018 3:54 PM    HISTORY: Closed nondisplaced fracture of neck of right radius with  routine healing.   COMPARISON: 1/24/2018.  IMPRESSION: Early healing about the fracture of the radial neck.  Alignment is stable.    Assessment:  1. Closed nondisp fracture of neck of right radius with routine healing      Plan:  - Today's Plan of Care:  Work Restrictions  Rehab: Occupational Therapy: [facility] - [phone]  Continuing to limit lifting and weightbearing with left arm    Follow Up: 6 weeks with re-xray    Concerning signs and symptoms were reviewed.  The patient expressed understanding of this management plan and all questions were answered at this time.    Shila Craig MD CAQ  Primary Care Sports Medicine  [facility]    Again, thank you for allowing me to participate in the care of your patient.        Sincerely,        Shila Craig MD

## 2018-02-21 NOTE — PROGRESS NOTES
"Sports Medicine Clinic Visit - Interim History February 21, 2018      PCP: Lakisha Flanagan    Dasia Reynolds is a 62 year old female who is seen in f/u up for Closed nondisp fracture of neck of right radius with routine healing. Since last visit on 1/24/18 patient has improved overall but still having pain with wrist rotation.  - Now ~ 6 weeks from initial injury    Social History: works at assisted living    Review of Systems  Skin: no bruising, no swelling  Musculoskeletal: as above  Neurologic: no numbness, paresthesias  Remainder of review of systems is negative including constitutional, CV, pulmonary, GI, except as noted in HPI or medical history.    Patient's current problem list, past medical and surgical history, and family history were reviewed.    Patient Active Problem List   Diagnosis     Advanced directives, counseling/discussion     No past medical history on file.  Past Surgical History:   Procedure Laterality Date     CHOLECYSTECTOMY  2008     Family History   Problem Relation Age of Onset     Respiratory Father      emphysema     Alzheimer Disease Maternal Grandmother      CANCER Maternal Grandfather      pancreatic cancer     CANCER Paternal Grandfather      lung     GASTROINTESTINAL DISEASE Sister      diverticulis     Alzheimer Disease Mother        Objective  /84 (BP Location: Right arm, Patient Position: Sitting, Cuff Size: Adult Regular)  Ht 5' 3\" (1.6 m)  Wt 148 lb (67.1 kg)  LMP 09/11/2001  BMI 26.22 kg/m2    GENERAL APPEARANCE: healthy, alert and no distress   GAIT: NORMAL  SKIN: no suspicious lesions or rashes  HEENT: Sclera clear, anicteric  CV: good peripheral pulses  RESP: Breathing not labored  NEURO: Normal strength and tone, mentation intact and speech normal  PSYCH:  mentation appears normal and affect normal/bright    Bilateral Elbow/arm exam:  Inspection:     no ecchymosis       no edema or effusion      Tender:     radial head right mild      Non-Tender: "      remainder of the elbow bilaterally and right wrist      ROM:      flexion 120 right       extension 5 right       forearm supination slightly limited right       forearm pronation slightly limited right      Strength:     pain with resisted strength testing right, though 4/5 throughout      Sensation:     intact throughout hand       intact throughout forearm    Radiology  I ordered, visualized and reviewed these images with the patient  RIGHT ELBOW THREE OR MORE VIEWS  2/21/2018 3:54 PM    HISTORY: Closed nondisplaced fracture of neck of right radius with  routine healing.   COMPARISON: 1/24/2018.  IMPRESSION: Early healing about the fracture of the radial neck.  Alignment is stable.    Assessment:  1. Closed nondisp fracture of neck of right radius with routine healing      Plan:  - Today's Plan of Care:  Work Restrictions  [facility] [phone]  Continuing to limit lifting and weightbearing with left arm    Follow Up: 6 weeks with re-xray    Concerning signs and symptoms were reviewed.  The patient expressed understanding of this management plan and all questions were answered at this time.    Shila Craig MD CAQ  Primary Care Sports Medicine  [facility]

## 2018-02-21 NOTE — MR AVS SNAPSHOT
"              After Visit Summary   2/21/2018    Dasia Reynolds    MRN: [id number]           Patient Information     Date Of Birth          1955        Visit Information        Provider Department      2/21/2018 3:40 PM Shila Craig MD [facility] [place]        Today's Diagnoses     Closed nondisp fracture of neck of right radius with routine healing    -  1      Care Instructions    Plan:  - Today's Plan of Care:  Work Restrictions  Rehab: Occupational Therapy: [facility] - [phone]  Continuing to limit lifting and weightbearing with left arm    Follow Up: 6 weeks with re-xray              Follow-ups after your visit        Additional Services     OCCUPATIONAL THERAPY REFERRAL       *This therapy referral will be filtered to a centralized scheduling office at [facility] and the patient will receive a call to schedule an appointment at a [facility] location most convenient for them. *     [facility] provides Occupational Therapy evaluation and treatment and many specialty services across the [facility] system.  If requesting a specialty program, please choose from the list below.    If you have not heard from the scheduling office within 2 business days, please call [phone] for all locations, with the exception of [place], please call [phone] and [facility], please call [phone]    Treatment: Evaluation & Treatment  Special Instructions/Modalities: None  Special Programs: Hand Therapy ([facility], [facility] & [place] locations only)    Please be aware that coverage of these services is subject to the terms and limitations of your health insurance plan.  Call member services at your health plan with any benefit or coverage questions.      **Note to Provider:  If you are referring outside of [facility] for the therapy appointment, please list the name of the location in the \"special instructions\" above, print the referral and " "give to the patient to schedule the appointment.                  Who to contact     If you have questions or need follow up information about today's clinic visit or your schedule please contact [place] [facility] directly at [phone].  Normal or non-critical lab and imaging results will be communicated to you by MyChart, letter or phone within 4 business days after the clinic has received the results. If you do not hear from us within 7 days, please contact the clinic through [employer] or phone. If you have a critical or abnormal lab result, we will notify you by phone as soon as possible.  Submit refill requests through FIGMD or call your pharmacy and they will forward the refill request to us. Please allow 3 business days for your refill to be completed.          Additional Information About Your Visit        GlucoTecharStrevus Information     FIGMD lets you send messages to your doctor, view your test results, renew your prescriptions, schedule appointments and more. To sign up, go to [email or web address] . Click on \"Log in\" on the left side of the screen, which will take you to the Welcome page. Then click on \"Sign up Now\" on the right side of the page.     You will be asked to enter the access code listed below, as well as some personal information. Please follow the directions to create your username and password.     Your access code is: [email or web address]  Expires: 2018  4:12 PM     Your access code will  in 90 days. If you need help or a new code, please call your [place] clinic or [phone].        Care EveryWhere ID     This is your Care EveryWhere ID. This could be used by other organizations to access your [place] medical records  [id number]        Your Vitals Were     Height Last Period BMI (Body Mass Index)             5' 3\" (1.6 m) 2001 26.22 kg/m2          Blood Pressure from Last 3 Encounters:   18 138/84   18 132/82   01/15/18 (!) 137/92    " Weight from Last 3 Encounters:   02/21/18 148 lb (67.1 kg)   01/24/18 148 lb (67.1 kg)   01/15/18 148 lb (67.1 kg)              We Performed the Following     OCCUPATIONAL THERAPY REFERRAL        Primary Care Provider Office Phone # Fax #    Lakisha Flanagan [phone]       [facility] [address] [address]        Equal Access to Services     ABDIAZIZ NOVAK : Hadii aad ku hadasho Soomaali, waaxda luqadaha, qaybta kaalmada adeegyada, waxay idiin hayaan adeeg khkierrajessie la'davidn . So [facility] [phone].    ATENCIÓN: Si rosaurala espumair, tiene a colorado disposición servicios gratuitos de asistencia lingüística. [facility] [phone].    We comply with applicable federal civil rights laws and [place] laws. We do not discriminate on the basis of race, color, national origin, age, disability, sex, sexual orientation, or gender identity.            Thank you!     Thank you for choosing [place] [facility]  for your care. Our goal is always to provide you with excellent care. Hearing back from our patients is one way we can continue to improve our services. Please take a few minutes to complete the written survey that you may receive in the mail after your visit with us. Thank you!             Your Updated Medication List - Protect others around you: Learn how to safely use, store and throw away your medicines at www.disposemymeds.org.          This list is accurate as of 2/21/18  4:18 PM.  Always use your most recent med list.                   Brand Name Dispense Instructions for use Diagnosis    albuterol 108 (90 BASE) MCG/ACT Inhaler    PROAIR HFA/PROVENTIL HFA/VENTOLIN HFA    3 Inhaler    Inhale 2 puffs into the lungs every 6 hours as needed for shortness of breath / dyspnea.        ANTIVERT 25 MG tablet   Generic drug:  meclizine     90 tablet    Take 0.5 tablets (12.5 mg) by mouth daily        glucosamine complex Tabs      Take  by mouth.        ipratropium  - albuterol 0.5 mg/2.5 mg/3 mL 0.5-2.5 (3) MG/3ML neb solution    DUONEB    30 vial    Take 3 mLs by nebulization every 6 hours as needed for shortness of breath / dyspnea.        ipratropium 17 MCG/ACT Inhaler    ATROVENT HFA     Inhale 2 puffs into the lungs 2 times daily        LORazepam 1 MG tablet    ATIVAN    4 tablet    Take 0.5 tablets by mouth. Up to 3 times daily, for torticolis.        Multi-vitamin Tabs tablet   Generic drug:  multivitamin, therapeutic with minerals     100 tablet    Take 1 tablet by mouth daily.        omeprazole 20 MG tablet     30 tablet    Take 20 mg by mouth. 1-2 daily        predniSONE 20 MG tablet    DELTASONE    10 tablet    Take two tablets (= 40mg) each day for 5 (five) days        PREVACID PO      Take by mouth daily        TYLENOL 325 MG tablet   Generic drug:  acetaminophen      2 TABLETS BY MOUTH EVERY 4 HOURS AS NEEDED, DO NOT EXCEED 4,000 MG OF ACETAMINOPHEN IN 24 HOURS

## 2018-02-21 NOTE — LETTER
To Whom It May Concern:      Dasia Reynolds was seen in our clinic today 2/21/2018 for a right elbow injury. She may return to work with the following restrictions: minimal lifting (<5 lbs) or weight bearing with right arm. She will follow-up in clinic in about 6 weeks for re-evaluation.  - May be limited in lifting and weight bearing for up to 3 months post injury.          Sincerely,          Shila Craig MD

## 2018-04-04 ENCOUNTER — OFFICE VISIT (OUTPATIENT)
Dept: ORTHOPEDICS | Facility: CLINIC | Age: 63
End: 2018-04-04
Payer: OTHER MISCELLANEOUS

## 2018-04-04 ENCOUNTER — RADIANT APPOINTMENT (OUTPATIENT)
Dept: GENERAL RADIOLOGY | Facility: CLINIC | Age: 63
End: 2018-04-04
Attending: PEDIATRICS
Payer: OTHER MISCELLANEOUS

## 2018-04-04 VITALS
SYSTOLIC BLOOD PRESSURE: 118 MMHG | WEIGHT: 148 LBS | BODY MASS INDEX: 26.22 KG/M2 | HEIGHT: 63 IN | DIASTOLIC BLOOD PRESSURE: 88 MMHG

## 2018-04-04 DIAGNOSIS — S52.134D CLOSED NONDISPLACED FRACTURE OF NECK OF RIGHT RADIUS WITH ROUTINE HEALING, SUBSEQUENT ENCOUNTER: ICD-10-CM

## 2018-04-04 DIAGNOSIS — S52.134D CLOSED NONDISPLACED FRACTURE OF NECK OF RIGHT RADIUS WITH ROUTINE HEALING, SUBSEQUENT ENCOUNTER: Primary | ICD-10-CM

## 2018-04-04 PROCEDURE — 99213 OFFICE O/P EST LOW 20 MIN: CPT | Performed by: PEDIATRICS

## 2018-04-04 PROCEDURE — 73080 X-RAY EXAM OF ELBOW: CPT | Mod: RT

## 2018-04-04 NOTE — LETTER
April 4, 2018      Dasia Reynolds  [address]  [place] [address]        To Whom It May Concern:      Dasia Reynolds was seen in our clinic today 4/4/2018 for a right elbow injury. She may return to work with the following restrictions: minimal lifting (<5 lbs) or weight bearing with right arm. She will follow-up in clinic in about 2 months for re-evaluation.  She should start OT.    Sincerely,        Shila Craig MD

## 2018-04-04 NOTE — LETTER
"    4/4/2018         RE: Dasia Reynolds  [address]  [place] [facility] [place] [address]        Dear Colleague,    Thank you for referring your patient, Dasia Reynolds, to the [facility]. Please see a copy of my visit note below.    Sports Medicine Clinic Visit - Interim History April 4, 2018      PCP: Lakisha Flanagan    Dasia Reynolds is a 62 year old female who is seen in f/u up for Closed nondisplaced fracture of neck of right radius with routine healing, subsequent encounter. Since last visit on 2/21/18 patient has been doing well overall. She reports she has not been able to get into OT because of work comp and having jury duty. She reports her pain is down and has almost full extension. She still has pain with lifting and avoids it overall.  - Now ~ 3 months from initial injury    Social History: Assisted living    Review of Systems  Skin: no bruising, no swelling  Musculoskeletal: as above  Neurologic: no numbness, paresthesias  Remainder of review of systems is negative including constitutional, CV, pulmonary, GI, except as noted in HPI or medical history.    Patient's current problem list, past medical and surgical history, and family history were reviewed.    Patient Active Problem List   Diagnosis     Advanced directives, counseling/discussion     No past medical history on file.  Past Surgical History:   Procedure Laterality Date     CHOLECYSTECTOMY  2008     Family History   Problem Relation Age of Onset     Respiratory Father      emphysema     Alzheimer Disease Maternal Grandmother      CANCER Maternal Grandfather      pancreatic cancer     CANCER Paternal Grandfather      lung     GASTROINTESTINAL DISEASE Sister      diverticulis     Alzheimer Disease Mother        Objective  /88 (BP Location: Left arm, Patient Position: Sitting, Cuff Size: Adult Regular)  Ht 5' 3\" (1.6 m)  Wt 148 lb (67.1 kg)  LMP 09/11/2001  BMI 26.22 kg/m2    GENERAL APPEARANCE: healthy, alert " and no distress   GAIT: NORMAL  SKIN: no suspicious lesions or rashes  HEENT: Sclera clear, anicteric  CV: good peripheral pulses  RESP: Breathing not labored  NEURO: Normal strength and tone, mentation intact and speech normal  PSYCH:  mentation appears normal and affect normal/bright    Bilateral Elbow/arm exam:  Inspection:     no ecchymosis       no edema or effusion      Tender:     radial head right mild      Non-Tender:      remainder of the elbow bilaterally and right wrist      ROM:      flexion 130 right       extension 2-3 right       forearm supination near normal right       forearm pronation near normal right      Strength:     mild pain with resisted strength testing right, though 5-/5 throughout      Sensation:     intact throughout hand       intact throughout forearm    Radiology  I ordered, visualized and reviewed these images with the patient  3 XR views of right elbow reviewed: continued healing of proximal radial neck fracture  - will follow official read    Assessment:  1. Closed nondisplaced fracture of neck of right radius with routine healing, subsequent encounter      Given residual symptoms, I recommend OT for strengthening.    Plan:  - Today's Plan of Care:  Work Restrictions  Start OT/hand therapy    Follow Up: 2 months    Concerning signs and symptoms were reviewed.  The patient expressed understanding of this management plan and all questions were answered at this time.    Shila Craig MD CAQ  Primary Care Sports Medicine  [facility]    Again, thank you for allowing me to participate in the care of your patient.        Sincerely,        Shila Craig MD

## 2018-04-04 NOTE — PATIENT INSTRUCTIONS
Plan:  - Today's Plan of Care:  Work Restrictions  Start OT/hand therapy    Follow Up: 2 months    If you have any further questions for your physician or physician s care team you can call [phone] and use option 3 to leave a voice message. Calls received during business hours will be returned same day.

## 2018-04-04 NOTE — PROGRESS NOTES
"Sports Medicine Clinic Visit - Interim History April 4, 2018      PCP: Lakisha Flanagan    Dasia Reynolds is a 62 year old female who is seen in f/u up for Closed nondisplaced fracture of neck of right radius with routine healing, subsequent encounter. Since last visit on 2/21/18 patient has been doing well overall. She reports she has not been able to get into OT because of work comp and having jury duty. She reports her pain is down and has almost full extension. She still has pain with lifting and avoids it overall.  - Now ~ 3 months from initial injury    Social History: Assisted living    Review of Systems  Skin: no bruising, no swelling  Musculoskeletal: as above  Neurologic: no numbness, paresthesias  Remainder of review of systems is negative including constitutional, CV, pulmonary, GI, except as noted in HPI or medical history.    Patient's current problem list, past medical and surgical history, and family history were reviewed.    Patient Active Problem List   Diagnosis     Advanced directives, counseling/discussion     No past medical history on file.  Past Surgical History:   Procedure Laterality Date     CHOLECYSTECTOMY  2008     Family History   Problem Relation Age of Onset     Respiratory Father      emphysema     Alzheimer Disease Maternal Grandmother      CANCER Maternal Grandfather      pancreatic cancer     CANCER Paternal Grandfather      lung     GASTROINTESTINAL DISEASE Sister      diverticulis     Alzheimer Disease Mother        Objective  /88 (BP Location: Left arm, Patient Position: Sitting, Cuff Size: Adult Regular)  Ht 5' 3\" (1.6 m)  Wt 148 lb (67.1 kg)  LMP 09/11/2001  BMI 26.22 kg/m2    GENERAL APPEARANCE: healthy, alert and no distress   GAIT: NORMAL  SKIN: no suspicious lesions or rashes  HEENT: Sclera clear, anicteric  CV: good peripheral pulses  RESP: Breathing not labored  NEURO: Normal strength and tone, mentation intact and speech normal  PSYCH:  mentation appears " normal and affect normal/bright    Bilateral Elbow/arm exam:  Inspection:     no ecchymosis       no edema or effusion      Tender:     radial head right mild      Non-Tender:      remainder of the elbow bilaterally and right wrist      ROM:      flexion 130 right       extension 2-3 right       forearm supination near normal right       forearm pronation near normal right      Strength:     mild pain with resisted strength testing right, though 5-/5 throughout      Sensation:     intact throughout hand       intact throughout forearm    Radiology  I ordered, visualized and reviewed these images with the patient  3 XR views of right elbow reviewed: continued healing of proximal radial neck fracture  - will follow official read    Assessment:  1. Closed nondisplaced fracture of neck of right radius with routine healing, subsequent encounter      Given residual symptoms, I recommend OT for strengthening.    Plan:  - Today's Plan of Care:  Work Restrictions  Start OT/hand therapy    Follow Up: 2 months    Concerning signs and symptoms were reviewed.  The patient expressed understanding of this management plan and all questions were answered at this time.    Shila Craig MD CAQ  Primary Care Sports Medicine  [facility]

## 2018-04-04 NOTE — MR AVS SNAPSHOT
"              After Visit Summary   4/4/2018    Dasia Reynolds    MRN: [id number]           Patient Information     Date Of Birth          1955        Visit Information        Provider Department      4/4/2018 3:40 PM Shila Craig MD [facility]        Today's Diagnoses     Closed nondisplaced fracture of neck of right radius with routine healing, subsequent encounter    -  1      Care Instructions    Plan:  - Today's Plan of Care:  Work Restrictions  Start OT/hand therapy    Follow Up: 2 months    If you have any further questions for your physician or physician s care team you can call [phone] and use option 3 to leave a voice message. Calls received during business hours will be returned same day.                Follow-ups after your visit        Who to contact     If you have questions or need follow up information about today's clinic visit or your schedule please contact [facility] directly at [phone].  Normal or non-critical lab and imaging results will be communicated to you by Korbitechart, letter or phone within 4 business days after the clinic has received the results. If you do not hear from us within 7 days, please contact the clinic through Korbitechart or phone. If you have a critical or abnormal lab result, we will notify you by phone as soon as possible.  Submit refill requests through Tango Card or call your pharmacy and they will forward the refill request to us. Please allow 3 business days for your refill to be completed.          Additional Information About Your Visit        Tango Card Information     Tango Card lets you send messages to your doctor, view your test results, renew your prescriptions, schedule appointments and more. To sign up, go to [email or web address] . Click on \"Log in\" on the left side of the screen, which will take you to the Welcome page. Then click on \"Sign up Now\" on the right side of the page.     You " "will be asked to enter the access code listed below, as well as some personal information. Please follow the directions to create your username and password.     Your access code is: [email or web address]  Expires: 2018  5:12 PM     Your access code will  in 90 days. If you need help or a new code, please call your [facility] clinic or [phone].        Care EveryWhere ID     This is your Care EveryWhere ID. This could be used by other organizations to access your [facility] medical records  [id number]        Your Vitals Were     Height Last Period BMI (Body Mass Index)             5' 3\" (1.6 m) 2001 26.22 kg/m2          Blood Pressure from Last 3 Encounters:   18 118/88   18 138/84   18 132/82    Weight from Last 3 Encounters:   18 148 lb (67.1 kg)   18 148 lb (67.1 kg)   18 148 lb (67.1 kg)               Primary Care Provider Office Phone # Fax #    Lakisha BARNETT Masha [phone]       [facility] [address] [facility] [address]        Equal Access to Services     ABDIAZIZ NOVAK AH: Hadii dell ku hadasho Soomaali, waaxda luqadaha, qaybta kaalmada adeegyada, waxay lacie breaux. So [facility] [phone].    ATENCIÓN: Si habla español, tiene a colorado disposición servicios gratuitos de asistencia lingüística. Llame al [phone].    We comply with applicable federal civil rights laws and [place] laws. We do not discriminate on the basis of race, color, national origin, age, disability, sex, sexual orientation, or gender identity.            Thank you!     Thank you for choosing [facility]  for your care. Our goal is always to provide you with excellent care. Hearing back from our patients is one way we can continue to improve our services. Please take a few minutes to complete the written survey that you may receive in the mail after your visit with us. Thank you!             Your Updated " Medication List - Protect others around you: Learn how to safely use, store and throw away your medicines at www.disposemymeds.org.          This list is accurate as of 4/4/18  4:25 PM.  Always use your most recent med list.                   Brand Name Dispense Instructions for use Diagnosis    albuterol 108 (90 BASE) MCG/ACT Inhaler    PROAIR HFA/PROVENTIL HFA/VENTOLIN HFA    3 Inhaler    Inhale 2 puffs into the lungs every 6 hours as needed for shortness of breath / dyspnea.        ANTIVERT 25 MG tablet   Generic drug:  meclizine     90 tablet    Take 0.5 tablets (12.5 mg) by mouth daily        glucosamine complex Tabs      Take  by mouth.        ipratropium - albuterol 0.5 mg/2.5 mg/3 mL 0.5-2.5 (3) MG/3ML neb solution    DUONEB    30 vial    Take 3 mLs by nebulization every 6 hours as needed for shortness of breath / dyspnea.        ipratropium 17 MCG/ACT Inhaler    ATROVENT HFA     Inhale 2 puffs into the lungs 2 times daily        LORazepam 1 MG tablet    ATIVAN    4 tablet    Take 0.5 tablets by mouth. Up to 3 times daily, for torticolis.        Multi-vitamin Tabs tablet   Generic drug:  multivitamin, therapeutic with minerals     100 tablet    Take 1 tablet by mouth daily.        omeprazole 20 MG tablet     30 tablet    Take 20 mg by mouth. 1-2 daily        predniSONE 20 MG tablet    DELTASONE    10 tablet    Take two tablets (= 40mg) each day for 5 (five) days        PREVACID PO      Take by mouth daily        TYLENOL 325 MG tablet   Generic drug:  acetaminophen      2 TABLETS BY MOUTH EVERY 4 HOURS AS NEEDED, DO NOT EXCEED 4,000 MG OF ACETAMINOPHEN IN 24 HOURS

## 2018-04-11 ENCOUNTER — HOSPITAL ENCOUNTER (OUTPATIENT)
Dept: OCCUPATIONAL THERAPY | Facility: CLINIC | Age: 63
Setting detail: THERAPIES SERIES
End: 2018-04-11
Attending: PEDIATRICS
Payer: OTHER MISCELLANEOUS

## 2018-04-11 PROCEDURE — 97110 THERAPEUTIC EXERCISES: CPT | Mod: GO | Performed by: OCCUPATIONAL THERAPIST

## 2018-04-11 PROCEDURE — 40000839 ZZH STATISTIC HAND THERAPY VISIT: Performed by: OCCUPATIONAL THERAPIST

## 2018-04-11 PROCEDURE — 97165 OT EVAL LOW COMPLEX 30 MIN: CPT | Mod: GO | Performed by: OCCUPATIONAL THERAPIST

## 2018-04-11 NOTE — PROGRESS NOTES
04/11/18  Hand Therapy Evaluation   Quick Adds   Quick Adds Fall Risk Screen   General Information/History   Start Of Care Date 04/11/18   Referring Physician Dr. Craig   Orders Evaluate And Treat As Indicated   Orders Date 02/21/18   Medical Diagnosis Closed nondisplaced fracture of neck of right radius    Precautions/Limitations 5# weight restriction   Additional Occupational Profile Info/Pertinent history of current problem Patient reports she fell at work on her elbow in January. Patient relates  that she was supposed to be here last month but called in for Jury duty. She says she saw MD who thought she should come in for at least an eval.   Past medical history see chart- COPD- quit smoking 5.5 years 3.ago   How/Where did it occur With a fall;At work   Onset date of current episode/exacerbation 01/10/18   Chronicity New   Hand Dominance Right   Affected side Right   Functional limitations perform activities of daily living;perform required work activities;perform desired leisure / sports activities   Reported Symptoms Loss of Motion/Stiffness   Prior level of function Independent ADL;Independent IADL   Important Activities [place], working   Occupation care assistant   Primary Job Tasks Lifting   Patient/Family goals statement Patient would like to learn some ex to improve her muscle tone.   Fall Risk Screen   Fall screen completed by OT   Have you fallen 2 or more times in the past year? Yes   Have you fallen and had an injury in the past year? Yes   Is patient a fall risk? No   Pain   Pain Primary Pain Report   Primary Pain Report   Location right proximal elbow   Pain Quality Dull   Frequency Intermittent   Scale 2/10   Pain Is Exacerbated By Lifting   Pain Is Relieved By Immobilization   Progression Since Onset Gradually Improving   Edema   Edema Elbow Crease   Elbow Crease (measured in cm)   Elbow Crease -  - Left 23   Elbow Crease -  - Right 23.5   Tenderness   Comment mild tenderness with palpation to  radius   ROM   ROM AROM   AROM   Comments full AROM   Sensation Findings   Sensation Findings Other (see comments)   Sensation Comments No sensory complaints   Strength   Strength Strength;Other (see comments)    Avg - Left 38    Avg - Right 45   Strength Comments generally 4/5 strength with mild pain U?E MMT   Education Assessment   Preferred Learning Style Demonstration   Barriers to Learning No barriers   Therapy Interventions   Planned Therapy Interventions Strengthening;Stretching;Home Program   Therapy plan comments Patient wants to start with home program   Clinical Impression   Criteria for Skilled Therapeutic Interventions Met yes   OT Diagnosis Decreased ADL's   Influenced by the following impairments Pain;Decreased strength;Edema   Assessment of Occupational Performance 1-3 Performance Deficits   Identified Performance Deficits opening things, carrying and lifting   Clinical Decision Making (Complexity) Low complexity   Therapy Frequency 1x/week   Predicted Duration of Therapy Intervention (days/wks) 6 weeks   Risks and Benefits of Treatment have been explained. Yes   Patient, Family & other staff in agreement with plan of care Yes   Clinical Impression Comments Patient is doing very well with normal ROM. Her strength is diminished affecting functional performance. Anticipate patient will benefit from skilled Hand Therapy To meet functional goals.   Hand Goals   Hand Goals Enter Other Activity Here   Enter Other Activity Here   Current Functional Task home ex program   Previous Performance Level ind   Current Performance Level limited   STG Target Task Patient to be able to independently perform home ex program   STG Target Performance Level mild difficulty   Due Date 05/11/18   LTG Target Task/Performance Same as STG   Total Evaluation Time   Zuleika Norwood OTR/L CHT  Occupational Therapist, Certified Hand Therapist

## 2018-05-07 ENCOUNTER — TELEPHONE (OUTPATIENT)
Dept: ORTHOPEDICS | Facility: CLINIC | Age: 63
End: 2018-05-07

## 2018-05-07 NOTE — TELEPHONE ENCOUNTER
LVM with scheduling number to schedule office visit with Dr. Craig and call back number for questions.    Mitchel Doherty, ATC

## 2018-05-07 NOTE — TELEPHONE ENCOUNTER
Reason for Call:  Other     Detailed comments: Pt was seen in 04/2018 for fx of rt radius. Recommended to follow up in 2 months - Pt not sure if that means an office visit or an x-ray or both. She called to schedule the x-rays, but there is no order in for that. - Please advise    Phone Number Patient can be reached at: Home number on file [phone] (home)    Best Time: Any    Can we leave a detailed message on this number? YES    Call taken on 5/7/2018 at 1:57 PM by Denise Behrendt

## 2018-05-24 ENCOUNTER — HOSPITAL ENCOUNTER (OUTPATIENT)
Dept: MAMMOGRAPHY | Facility: CLINIC | Age: 63
Discharge: HOME | End: 2018-05-24
Attending: FAMILY MEDICINE | Admitting: FAMILY MEDICINE
Payer: COMMERCIAL

## 2018-05-24 DIAGNOSIS — Z12.31 VISIT FOR SCREENING MAMMOGRAM: ICD-10-CM

## 2018-05-24 PROCEDURE — 77067 SCR MAMMO BI INCL CAD: CPT

## 2018-06-06 ENCOUNTER — RADIANT APPOINTMENT (OUTPATIENT)
Dept: GENERAL RADIOLOGY | Facility: CLINIC | Age: 63
End: 2018-06-06
Attending: PEDIATRICS
Payer: OTHER MISCELLANEOUS

## 2018-06-06 ENCOUNTER — OFFICE VISIT (OUTPATIENT)
Dept: ORTHOPEDICS | Facility: CLINIC | Age: 63
End: 2018-06-06
Payer: OTHER MISCELLANEOUS

## 2018-06-06 VITALS
BODY MASS INDEX: 26.22 KG/M2 | HEIGHT: 63 IN | DIASTOLIC BLOOD PRESSURE: 84 MMHG | WEIGHT: 148 LBS | SYSTOLIC BLOOD PRESSURE: 122 MMHG

## 2018-06-06 DIAGNOSIS — S52.134D CLOSED NONDISPLACED FRACTURE OF NECK OF RIGHT RADIUS WITH ROUTINE HEALING, SUBSEQUENT ENCOUNTER: ICD-10-CM

## 2018-06-06 DIAGNOSIS — S52.134D CLOSED NONDISPLACED FRACTURE OF NECK OF RIGHT RADIUS WITH ROUTINE HEALING, SUBSEQUENT ENCOUNTER: Primary | ICD-10-CM

## 2018-06-06 PROCEDURE — 99213 OFFICE O/P EST LOW 20 MIN: CPT | Performed by: PEDIATRICS

## 2018-06-06 PROCEDURE — 73080 X-RAY EXAM OF ELBOW: CPT | Mod: RT

## 2018-06-06 NOTE — LETTER
"    6/6/2018         RE: Dasia Reynolds  [address]  [place] [address]        Dear Colleague,    Thank you for referring your patient, Dasia Reynolds, to the [facility]. Please see a copy of my visit note below.    Sports Medicine Clinic Visit - Interim History June 6, 2018      PCP: Lakisha Flanagan    Dasia Reynolds is a 63 year old female who is seen in f/u up for Closed nondisplaced fracture of neck of right radius with routine healing, subsequent encounter. Since last visit on 4/4/18, patient has achiness towards the end of her work day. Patient reports she wraps the elbow with an ace wrap at work for comfort. She has completed formal physical therapy and is completing HEP. Will need a work note.  - Now ~ 5 months from initial injury    Social History: assisted living facility    Review of Systems  Skin: no bruising, no swelling  Musculoskeletal: as above  Neurologic: no numbness, paresthesias  Remainder of review of systems is negative including constitutional, CV, pulmonary, GI, except as noted in HPI or medical history.    Patient's current problem list, past medical and surgical history, and family history were reviewed.    Patient Active Problem List   Diagnosis     Advanced directives, counseling/discussion     No past medical history on file.  Past Surgical History:   Procedure Laterality Date     CHOLECYSTECTOMY  2008     Family History   Problem Relation Age of Onset     Respiratory Father      emphysema     Alzheimer Disease Maternal Grandmother      CANCER Maternal Grandfather      pancreatic cancer     CANCER Paternal Grandfather      lung     GASTROINTESTINAL DISEASE Sister      diverticulis     Alzheimer Disease Mother        Objective  /84 (BP Location: Left arm, Patient Position: Chair, Cuff Size: Adult Regular)  Ht 5' 3\" (1.6 m)  Wt 148 lb (67.1 kg)  LMP 09/11/2001  BMI 26.22 kg/m2    GENERAL APPEARANCE: healthy, alert and no distress "   GAIT: NORMAL  SKIN: no suspicious lesions or rashes  HEENT: Sclera clear, anicteric  CV: good peripheral pulses  RESP: Breathing not labored  NEURO: Normal strength and tone, mentation intact and speech normal  PSYCH:  mentation appears normal and affect normal/bright    Bilateral Elbow/arm exam:  Inspection:     no ecchymosis       no edema or effusion      Tender:     lateral flexor tendons right      Non-Tender:      remainder of the elbow bilaterally and right wrist      ROM:      flexion 130 right       extension 2-3 right       forearm supination near normal right       forearm pronation near normal right      Strength:     mild pain with resisted strength testing right, though 5-/5 throughout      Sensation:     intact throughout hand       intact throughout forearm    Radiology  I ordered, visualized and reviewed these images with the patient  3 XR views of right elbow reviewed: healed radial neck fracture  - will follow official read      Assessment:  1. Closed nondisplaced fracture of neck of right radius with routine healing, subsequent encounter      Healed fracture, given persistent symptoms I recommend return to occupational therapy.  Work restrictions over the next 2 months, follow-up as needed.    Plan:  - Today's Plan of Care:  Return Rehab: Occupational Therapy: [facility] - [phone] call to scheduled appt  Work letter provided with restrictions    Follow Up: 2 month as needed    Concerning signs and symptoms were reviewed.  The patient expressed understanding of this management plan and all questions were answered at this time.    Shila Craig MD [facility]  Primary Care Sports Medicine  [facility]    Again, thank you for allowing me to participate in the care of your patient.        Sincerely,        Shila Craig MD

## 2018-06-06 NOTE — LETTER
June 6, 2018      Dasia Reynolds  [address]  [place] [address]        To Whom It May Concern:      Dasia Reynolds was seen in our clinic today 6/6/2018 for a right elbow injury.  She may return to work with the following restrictions for the next 2 months: minimal lifting (<10 lbs) or weight bearing with right arm. She should continue OT.  Follow up at that time if needed.     Sincerely,           Shila Craig MD

## 2018-06-06 NOTE — PROGRESS NOTES
"Sports Medicine Clinic Visit - Interim History June 6, 2018      PCP: Lakisha Flanagan    Dasia Reynolds is a 63 year old female who is seen in f/u up for Closed nondisplaced fracture of neck of right radius with routine healing, subsequent encounter. Since last visit on 4/4/18, patient has achiness towards the end of her work day. Patient reports she wraps the elbow with an ace wrap at work for comfort. She has completed formal physical therapy and is completing HEP. Will need a work note.  - Now ~ 5 months from initial injury    Social History: assisted living facility    Review of Systems  Skin: no bruising, no swelling  Musculoskeletal: as above  Neurologic: no numbness, paresthesias  Remainder of review of systems is negative including constitutional, CV, pulmonary, GI, except as noted in HPI or medical history.    Patient's current problem list, past medical and surgical history, and family history were reviewed.    Patient Active Problem List   Diagnosis     Advanced directives, counseling/discussion     No past medical history on file.  Past Surgical History:   Procedure Laterality Date     CHOLECYSTECTOMY  2008     Family History   Problem Relation Age of Onset     Respiratory Father      emphysema     Alzheimer Disease Maternal Grandmother      CANCER Maternal Grandfather      pancreatic cancer     CANCER Paternal Grandfather      lung     GASTROINTESTINAL DISEASE Sister      diverticulis     Alzheimer Disease Mother        Objective  /84 (BP Location: Left arm, Patient Position: Chair, Cuff Size: Adult Regular)  Ht 5' 3\" (1.6 m)  Wt 148 lb (67.1 kg)  LMP 09/11/2001  BMI 26.22 kg/m2    GENERAL APPEARANCE: healthy, alert and no distress   GAIT: NORMAL  SKIN: no suspicious lesions or rashes  HEENT: Sclera clear, anicteric  CV: good peripheral pulses  RESP: Breathing not labored  NEURO: Normal strength and tone, mentation intact and speech normal  PSYCH:  mentation appears normal and affect " normal/bright    Bilateral Elbow/arm exam:  Inspection:     no ecchymosis       no edema or effusion      Tender:     lateral flexor tendons right      Non-Tender:      remainder of the elbow bilaterally and right wrist      ROM:      flexion 130 right       extension 2-3 right       forearm supination near normal right       forearm pronation near normal right      Strength:     mild pain with resisted strength testing right, though 5-/5 throughout      Sensation:     intact throughout hand       intact throughout forearm    Radiology  I ordered, visualized and reviewed these images with the patient  3 XR views of right elbow reviewed: healed radial neck fracture  - will follow official read      Assessment:  1. Closed nondisplaced fracture of neck of right radius with routine healing, subsequent encounter      Healed fracture, given persistent symptoms I recommend return to occupational therapy.  Work restrictions over the next 2 months, follow-up as needed.    Plan:  - Today's Plan of Care:  Return Rehab: Occupational Therapy: [facility] - [phone] call to scheduled appt  Work letter provided with restrictions    Follow Up: 2 month as needed    Concerning signs and symptoms were reviewed.  The patient expressed understanding of this management plan and all questions were answered at this time.    Shila Craig MD CAQ  Primary Care Sports Medicine  [facility]

## 2018-06-06 NOTE — MR AVS SNAPSHOT
"              After Visit Summary   6/6/2018    Dasia Reynolds    MRN: [id number]           Patient Information     Date Of Birth          1955        Visit Information        Provider Department      6/6/2018 4:00 PM Shila Craig MD [facility]        Today's Diagnoses     Closed nondisplaced fracture of neck of right radius with routine healing, subsequent encounter    -  1      Care Instructions        Plan:  - Today's Plan of Care:  Return Rehab: Occupational Therapy: [facility] - [phone] call to scheduled appt  Work letter provided with restrictions    Follow Up: 2 month as needed  If you have any further questions for your physician or physician s care team you can call [phone] and use option 3 to leave a voice message. Calls received during business hours will be returned same day.              Follow-ups after your visit        Who to contact     If you have questions or need follow up information about today's clinic visit or your schedule please contact [facility] directly at [phone].  Normal or non-critical lab and imaging results will be communicated to you by Newsanahart, letter or phone within 4 business days after the clinic has received the results. If you do not hear from us within 7 days, please contact the clinic through ActBluet or phone. If you have a critical or abnormal lab result, we will notify you by phone as soon as possible.  Submit refill requests through ShopIgniter or call your pharmacy and they will forward the refill request to us. Please allow 3 business days for your refill to be completed.          Additional Information About Your Visit        NewsanaharModaMi Information     ShopIgniter lets you send messages to your doctor, view your test results, renew your prescriptions, schedule appointments and more. To sign up, go to [email or web address] . Click on \"Log in\" on the left side of the screen, which " "will take you to the Welcome page. Then click on \"Sign up Now\" on the right side of the page.     You will be asked to enter the access code listed below, as well as some personal information. Please follow the directions to create your username and password.     Your access code is: [email or web address]  Expires: 2018  3:30 PM     Your access code will  in 90 days. If you need help or a new code, please call your [facility] clinic or [phone].        Care EveryWhere ID     This is your Care EveryWhere ID. This could be used by other organizations to access your [facility] medical records  [id number]        Your Vitals Were     Height Last Period BMI (Body Mass Index)             5' 3\" (1.6 m) 2001 26.22 kg/m2          Blood Pressure from Last 3 Encounters:   18 122/84   18 118/88   18 138/84    Weight from Last 3 Encounters:   18 148 lb (67.1 kg)   18 148 lb (67.1 kg)   18 148 lb (67.1 kg)               Primary Care Provider Office Phone # Fax #    Lakisha ANIBAL Flanagan [phone]       [facility] [address] [facility] [address]        Equal Access to Services     ABDIAZIZ NOVAK AH: Hadii dell vera hadgaryo Sonegrito, waaxda luqadaha, qaybta kaalmada edwar, taylor breaux. So [facility] [phone].    ATENCIÓN: Si habla español, tiene a colorado disposición servicios gratuitos de asistencia lingüística. Tammie al [phone].    We comply with applicable federal civil rights laws and [place] laws. We do not discriminate on the basis of race, color, national origin, age, disability, sex, sexual orientation, or gender identity.            Thank you!     Thank you for choosing [facility]  for your care. Our goal is always to provide you with excellent care. Hearing back from our patients is one way we can continue to improve our services. Please take a few minutes to complete the written " survey that you may receive in the mail after your visit with us. Thank you!             Your Updated Medication List - Protect others around you: Learn how to safely use, store and throw away your medicines at www.disposemymeds.org.          This list is accurate as of 6/6/18  4:43 PM.  Always use your most recent med list.                   Brand Name Dispense Instructions for use Diagnosis    albuterol 108 (90 Base) MCG/ACT Inhaler    PROAIR HFA/PROVENTIL HFA/VENTOLIN HFA    3 Inhaler    Inhale 2 puffs into the lungs every 6 hours as needed for shortness of breath / dyspnea.        ANTIVERT 25 MG tablet   Generic drug:  meclizine     90 tablet    Take 0.5 tablets (12.5 mg) by mouth daily        glucosamine complex Tabs      Take  by mouth.        ipratropium - albuterol 0.5 mg/2.5 mg/3 mL 0.5-2.5 (3) MG/3ML neb solution    DUONEB    30 vial    Take 3 mLs by nebulization every 6 hours as needed for shortness of breath / dyspnea.        ipratropium 17 MCG/ACT Inhaler    ATROVENT HFA     Inhale 2 puffs into the lungs 2 times daily        LORazepam 1 MG tablet    ATIVAN    4 tablet    Take 0.5 tablets by mouth. Up to 3 times daily, for torticolis.        Multi-vitamin Tabs tablet   Generic drug:  multivitamin, therapeutic with minerals     100 tablet    Take 1 tablet by mouth daily.        omeprazole 20 MG tablet     30 tablet    Take 20 mg by mouth. 1-2 daily        predniSONE 20 MG tablet    DELTASONE    10 tablet    Take two tablets (= 40mg) each day for 5 (five) days        PREVACID PO      Take by mouth daily        TYLENOL 325 MG tablet   Generic drug:  acetaminophen      2 TABLETS BY MOUTH EVERY 4 HOURS AS NEEDED, DO NOT EXCEED 4,000 MG OF ACETAMINOPHEN IN 24 HOURS

## 2018-06-06 NOTE — PATIENT INSTRUCTIONS
Plan:  - Today's Plan of Care:  Return Rehab: [facility] [phone] call to scheduled appt  Work letter provided with restrictions    Follow Up: 2 month as needed  If you have any further questions for your physician or physician s care team you can call [phone] and use option 3 to leave a voice message. Calls received during business hours will be returned same day.

## 2018-06-20 ENCOUNTER — HOSPITAL ENCOUNTER (OUTPATIENT)
Dept: OCCUPATIONAL THERAPY | Facility: CLINIC | Age: 63
Setting detail: THERAPIES SERIES
End: 2018-06-20
Attending: PEDIATRICS
Payer: OTHER MISCELLANEOUS

## 2018-06-20 PROCEDURE — 40000839 ZZH STATISTIC HAND THERAPY VISIT: Performed by: OCCUPATIONAL THERAPIST

## 2018-06-20 PROCEDURE — 97110 THERAPEUTIC EXERCISES: CPT | Mod: GO | Performed by: OCCUPATIONAL THERAPIST

## 2018-06-20 PROCEDURE — 97530 THERAPEUTIC ACTIVITIES: CPT | Mod: GO | Performed by: OCCUPATIONAL THERAPIST

## 2018-06-20 PROCEDURE — 97035 APP MDLTY 1+ULTRASOUND EA 15: CPT | Mod: GO | Performed by: OCCUPATIONAL THERAPIST

## 2018-06-20 NOTE — PROGRESS NOTES
06/20/18 0500   Notes   Note Type Progress Note   Providers   Providers Zuleika Norwood, PRISCILLAR/L, CHT   Referring Physician Dr. Craig   Reporting Period   Reporting period from 04/11/18   Reporting period to 06/20/18   General Information   Rxs Authorized ?   Rxs Used 2   Medical Diagnosis Closed nondisplaced fracture of neck of right radius    Orders Evaluate And Treat As Indicated   Insurance WC   Start Of Care Date 04/11/18   Onset date of current episode/exacerbation 01/10/18   Date for Next MD Appointment 06/11/18   Precautions/Limitations 10# weight restriction   Subjective Measures   Subjective Can do most things. Still trouble opening a jar   Patient Reported % Functional Improvement ULDQ 75 was 72   Objective Measures   Objective Measures Strength;Objective Measure 1   Strength    42#,38#   Objective Measure 1   Objective Measure has full ROM and near full strength   Modalities   Modalities Ultrasound   Ultrasound -Type Pulsed 50%;5 cm sound head   Skilled Interventions To Increase Blood Flow For Improved Healing;To Decrease Pain   Intensity 1.0w/cm2   Duration (does not include the 3-5 min set up/clean up time) 8 min   Frequency 3 MHz   Location forearm extensors   Therapeutic Exercise   Therapeutic Exercise Strengthening   Skilled Interventions To Increase Strength  (verval and physical cuing needed)   Minutes of Treatment 16   Other Exer/Activities/Educ   Other Exer/Activities/Educ - All exercises are part of HEP unless otherwise noted Exercise 1;Exercise 2;Exercise 3   Exercise 1 Strething prior and after ex   Description 1 HEP insturction   Exercise 2 instructed in light Cardio for 5-10 min prior to weight activities   Description 2 red theraband ex biceps, wxtension and rows 2x10 each   Exercise 3 pain free x 10   Description 3 scar retraction ex for posture   Strengthening   Isotonics Wrist Extension;RD   Sets/Reps 1 set;10 reps;reviewed;HEP   Resistance 8 oz   Isotonics Forearm All Planes    Sets/Reps 1 set;10 reps   Resistance 1   Isotonics Elbow All Planes   Sets/Reps 1 set;10 reps   Resistance 1   Isotonics Shoulder All Planes   Sets/Reps 1 set;10 reps   Resistance 1   Manual   Manual STM   Skilled Interventions To Decrease Pain   Minutes of Treatment 8   STM Extensors   Description/ Technique mod pressure with Tigerbalm and HEP instruction   Hand Goals   Hand Goals Enter Other Activity Here   Assessment   Clinical Impression(s) Comments Patient doing very well with home program and has some new strategies to progress on her own.   Education   Learner Patient   Readiness Eager   Method Booklet/handout;Explanation;Demonstration   Response Verbalizes understanding;Demonstrates understanding   Education Notes see home program.   Plan   Home program ex as above   Plan Patient would like to try at home. Will hold chart open for one month incase she needs to come for further instruction and or upgrade   Total Session Time   Timed Code Treatment Minutes 32   Zuleika Norwood, PRISCILLAR/L CHARLAT  Occupational Therapist, Certified Hand Therapist

## 2018-10-10 ENCOUNTER — OFFICE VISIT (OUTPATIENT)
Dept: FAMILY MEDICINE | Facility: CLINIC | Age: 63
End: 2018-10-10
Payer: COMMERCIAL

## 2018-10-10 VITALS
WEIGHT: 146.4 LBS | RESPIRATION RATE: 16 BRPM | HEIGHT: 63 IN | BODY MASS INDEX: 25.94 KG/M2 | HEART RATE: 71 BPM | DIASTOLIC BLOOD PRESSURE: 82 MMHG | SYSTOLIC BLOOD PRESSURE: 118 MMHG | OXYGEN SATURATION: 97 % | TEMPERATURE: 96.8 F

## 2018-10-10 DIAGNOSIS — R91.1 PULMONARY NODULE: ICD-10-CM

## 2018-10-10 DIAGNOSIS — Z12.11 SPECIAL SCREENING FOR MALIGNANT NEOPLASMS, COLON: ICD-10-CM

## 2018-10-10 DIAGNOSIS — J01.01 ACUTE RECURRENT MAXILLARY SINUSITIS: Primary | ICD-10-CM

## 2018-10-10 DIAGNOSIS — Z71.89 ADVANCED DIRECTIVES, COUNSELING/DISCUSSION: ICD-10-CM

## 2018-10-10 DIAGNOSIS — J44.9 CHRONIC OBSTRUCTIVE PULMONARY DISEASE, UNSPECIFIED COPD TYPE (H): ICD-10-CM

## 2018-10-10 PROCEDURE — 99213 OFFICE O/P EST LOW 20 MIN: CPT | Performed by: FAMILY MEDICINE

## 2018-10-10 RX ORDER — ECHINACEA PURPUREA EXTRACT 125 MG
TABLET ORAL
COMMUNITY
Start: 2007-11-09 | End: 2019-08-12

## 2018-10-10 RX ORDER — RIBOFLAVIN (VITAMIN B2) 100 MG
TABLET ORAL
COMMUNITY
End: 2019-02-18

## 2018-10-10 RX ORDER — TIOTROPIUM BROMIDE 18 UG/1
CAPSULE ORAL; RESPIRATORY (INHALATION)
COMMUNITY
Start: 2018-07-19 | End: 2019-08-12

## 2018-10-10 RX ORDER — AZITHROMYCIN 250 MG/1
TABLET, FILM COATED ORAL
Qty: 6 TABLET | Refills: 0 | Status: SHIPPED | OUTPATIENT
Start: 2018-10-10 | End: 2019-08-12

## 2018-10-10 RX ORDER — MAGNESIUM CITRATE
296 SOLUTION, ORAL ORAL
COMMUNITY
Start: 2013-09-04 | End: 2019-02-18

## 2018-10-10 RX ORDER — FLUTICASONE PROPIONATE 50 MCG
2 SPRAY, SUSPENSION (ML) NASAL
COMMUNITY
Start: 2016-11-28 | End: 2019-02-18

## 2018-10-10 NOTE — PATIENT INSTRUCTIONS
Please call to schedule the low dose -[phone]    Call to schedule the colonoscopy    One pound is 3500 calories.  So to lose one pound per week need to cut out 500 calories per day or 3500 calories per week.  Do not count exercise toward or against your calories.  Write down everything that you eat and count calories or use an shaylee like [employer] It or MyFitness Pal or website like QuoVadis or Aentropico or Calorie Ed    You likely need to be closer to 1200 angela per day   Below is a 1500 Calorie Menu: This is a well balanced healthy 1500 calorie menu so you can follow it for as long as you need!    Day 1    Breakfast - 2 whole grain toast, 1 tablespoon of jelly, 1 teaspoon of butter, 1 cup of tea or coffee,   cup of orange juice.  Snack -   bagel, 1 cup of yogurt.  Lunch - 1 oz of sliced turkey or chicken breast, 1 tossed vegetable salad with 1 tablespoon of olive oil and lemon juice, 1 whole grain roll.  Snack -   cup of fresh strawberries, 1 cup of yogurt, 1 tablespoon of granola cereal.  Dinner - 3 oz of beef, grilled or broiled, 1 cup of rice, 1 teaspoon of butter,   cup of steamed carrots, mixed green salad with olive oil and lemon juice.  Snack - 1 apple or orange.    Day 2    Breakfast - 1 orange, 1 cup of whole grain cereal, 1 cup of milk, 1 cup of strawberries.  Snack - 2 teaspoons of peanut butter, 2 rice cakes.  Lunch - 1 cup of vegetable soup, 1 oz of mozzarella cheese, 1 mixed greens salad with olive oil and lemon juice, 1 cup of yogurt, few whole grain crackers.  Snack - 1 apple.  Dinner - 5 oz of white fish, baked, broiled or grilled, 1 baked potato, 1 cup of steamed broccoli, mixed greens salad with oil and lemon juice, 1 whole grain roll.  Snack - 3 cups of plain popcorn.    Day 3    Breakfast - 2 pancakes with 1 tablespoon of maple syrup or fruit spread.  Snack - 1 cup of milk, 1 peach.  Lunch - 6 oz of fish, grilled or broiled, mixed greens salad with 1 tablespoon of olive oil and lemon juice, 1  apple, and few whole grain crackers.  Snack - 1 granola bar.  Dinner - 2 cup of whole grain pasta,   cup of tomato sauce, mixed greens salad with olive oil and lemon juice.  Snack - 1 cup of milk, few peanuts.    Day 4    Breakfast -   cup of oatmeal, cooked with 1 teaspoons of brown sugar, 1 cup of milk, 1 orange.  Snack - 1 apple, 2 oz of almonds.  Lunch - 1 oz of sliced chicken or turkey breast, 1 teaspoon of mustard, 1 slices of whole wheat bread, 2 slices of tomato, 1   cup of sliced raw vegetables.  Snack -   cup of milk, 1 cup of strawberries.  Dinner - 3 oz skinless chicken breast, baked, broiled or grilled, 1 medium baked potato, 2 teaspoons of butter, mixed greens salad with 1 tablespoon of olive oil and lemon juice.  Snack - 1/2 cup of cottage cheese, 1 pear.    Day 5    Breakfast - 1 whole wheat bagel, 1 table spoon of cream cheese, 1 cup of orange juice.  Snack - 1 cup of yogurt, 1 apple.  Lunch - 2 oz lean hamburger, grilled or broiled, 1 cup of steamed asparagus, large tossed vegetable salad with yogurt dressing,   cup of cottage cheese.  Dinner - 2 cup of pasta with 3 oz of cooked shrimp and 1 /2 cup of broccoli, 1 slice of Italian bread, 1 teaspoon of garlic olive oil, mixed greens salad with oil and vinegar.    Day 6    Breakfast - 1 poached egg, 1 tomato, 1 whole wheat muffin,   grapefruit.  Snack - 1 cup of fruit salad, 1 cup of yogurt, 1 granola bar.  Lunch - 3 oz of sliced turkey or chicken breast, 1 gretel bread, 1 cup of sliced carrots and celery.  Snack - 1 peach,   cup of cottage cheese.  Dinner - 3 oz of cheese, few crackers, 1 mixed greens salad with olive oil and lemon juice, 1 glass of dry red wine.  Snack - 1 cup of fresh strawberries.    Day 7    Breakfast - 1 whole wheat toast, 1 hard boiled egg,   cup of blueberries, 1 cup of yogurt.  Snack - 1 pear, 1 oz of pretzels.  Lunch - 4 oz of cheese, 1 large vegetable salad with olive oil and lemon juice, 1 cup of milk.  Snack -   cup of  fruit salad, 1 granola bar.  Dinner - 3 oz of broiled or baked white fish, 1   cup of rice, 1 cup of steamed vegetables, 2 teaspoons of butter.        Thank you for choosing [facility].  You may be receiving a survey in the mail from [employer] [facility] regarding your visit today.  Please take a few minutes to complete and return the survey to let us know how we are doing.      If you have questions or concerns, please contact us via [employer] or you can contact your care team at [phone].    Our Clinic hours are:  Monday 6:40 am  to 7:00 pm  Tuesday -Friday 6:40 am to 5:00 pm    The [place] outpatient lab hours are:  Monday - Friday 6:10 am to 4:45 pm  Saturdays 7:00 am to 11:00 am  Appointments are required, call [phone]    If you have clinical questions after hours or would like to schedule an appointment,  call the clinic at [phone].  Self-Care for Sinusitis     Drinking plenty of water can help sinuses drain.   Sinusitis can often be managed with self-care. Self-care can keep sinuses moist and make you feel more comfortable. Remember to follow your doctor's instructions closely. This can make a big difference in getting your sinus problem under control.  Drink fluids  Drinking extra fluids helps thin your mucus. This lets it drain from your sinuses more easily. Have a glass of water every hour or two. A humidifier helps in much the same way. Fluids can also offset the drying effects of certain medicines. If you use a humidifier, follow the product maker's instructions on how to use it. Clean it on a regular schedule.  Use saltwater rinses  Rinses help keep your sinuses and nose moist. Mix a teaspoon of salt in 8 ounces of fresh, warm water. Use a bulb syringe to gently squirt the water into your nose a few times a day. You can also buy ready-made saline nasal sprays.  Apply hot or cold packs  Applying heat to the area surrounding your sinuses may make you feel more comfortable. Use a hot water bottle  or a hand towel dipped in hot water. Some people also find ice packs effective for relieving pain.  Medicines  Your doctor may prescribe medications to help treat your sinusitis. If you have an infection, antibiotics can help clear it up. If you are prescribed antibiotics, take all pills on schedule until they are gone, even if you feel better. Decongestants help relieve swelling. Use decongestant sprays for short periods only under the direction of your doctor. If you have allergies, your doctor may prescribe medications to help relieve them.   Date Last Reviewed: 10/1/2016    1236-5835 [employer]. [address] [place], [place] [address]. All rights reserved. This information is not intended as a substitute for professional medical care. Always follow your healthcare professional's instructions.        Sinusitis (Antibiotic Treatment)    The sinuses are air-filled spaces within the bones of the face. They connect to the inside of the nose. Sinusitis is an inflammation of the tissue that lines the sinuses. Sinusitis can occur during a cold. It can also happen due to allergies to pollens and other particles in the air. Sinusitis can cause symptoms of sinus congestion and a feeling of fullness. A sinus infection causes fever, headache, and facial pain. There is often green or yellow fluid draining from the nose or into the back of the throat (post-nasal drip). You have been given antibiotics to treat this condition.  Home care    Take the full course of antibiotics as instructed. Do not stop taking them, even when you feel better.    Drink plenty of water, hot tea, and other liquids. This may help thin nasal mucus. It also may help your sinuses drain fluids.    Heat may help soothe painful areas of your face. Use a towel soaked in hot water. Or,  the shower and direct the warm spray onto your face. Using a vaporizer along with a menthol rub at night may also help soothe  symptoms.     An expectorant with guaifenesin may help thin nasal mucus and help your sinuses drain fluids.    You can use an over-the-counter decongestant, unless a similar medicine was prescribed to you. Nasal sprays work the fastest. Use one that contains phenylephrine or oxymetazoline. First blow your nose gently. Then use the spray. Do not use these medicines more often than directed on the label. If you do, your symptoms may get worse. You may also take pills that contain pseudoephedrine. Don t use products that combine multiple medicines. This is because side effects may be increased. Read labels. You can also ask the pharmacist for help. (People with high blood pressure should not use decongestants. They can raise blood pressure.)    Over-the-counter antihistamines may help if allergies contributed to your sinusitis.      Do not use nasal rinses or irrigation during an acute sinus infection, unless your healthcare provider tells you to. Rinsing may spread the infection to other areas in your sinuses.    Use acetaminophen or ibuprofen to control pain, unless another pain medicine was prescribed to you. If you have chronic liver or kidney disease or ever had a stomach ulcer, talk with your healthcare provider before using these medicines. (Aspirin should never be taken by anyone under age 18 who is ill with a fever. It may cause severe liver damage.)    Don't smoke. This can make symptoms worse.  Follow-up care  Follow up with your healthcare provider or our staff if you are better in 1 week.  When to seek medical advice  Call your healthcare provider if any of these occur:    Facial pain or headache that gets worse    Stiff neck    Unusual drowsiness or confusion    Swelling of your forehead or eyelids    Vision problems, such as blurred or double vision    Fever of 100.4 F (38 C) or higher, or as directed by your healthcare provider    Seizure    Breathing problems    Symptoms don't go away in 10  days  Prevention  Here are steps you can take to help prevent an infection:    Keep good hand washing habits.    Don t have close contact with people who have sore throats, colds, or other upper respiratory infections.    Don t smoke, and stay away from secondhand smoke.    Stay up to date with of your vaccines.  Date Last Reviewed: 11/1/2017 2000-2017 [employer]. [address] [place], [place] [address]. All rights reserved. This information is not intended as a substitute for professional medical care. Always follow your healthcare professional's instructions.

## 2018-10-10 NOTE — MR AVS SNAPSHOT
After Visit Summary   10/10/2018    Dasia Reynolds    MRN: [id number]           Patient Information     Date Of Birth          1955        Visit Information        Provider Department      10/10/2018 6:40 AM Blair Saldaña MD [facility]        Today's Diagnoses     Chronic obstructive pulmonary disease with acute exacerbation (H)    -  1    Special screening for malignant neoplasms, colon        Advanced directives, counseling/discussion        Pulmonary nodule        Acute recurrent maxillary sinusitis          Care Instructions    Please call to schedule the low dose [phone]    Call to schedule the colonoscopy    One pound is 3500 calories.  So to lose one pound per week need to cut out 500 calories per day or 3500 calories per week.  Do not count exercise toward or against your calories.  Write down everything that you eat and count calories or use an shaylee like [employer] or MyFitness Pal or website like [employer] or MyPlate or Calorie Ed    You likely need to be closer to 1200 angela per day   Below is a 1500 Calorie Menu: This is a well balanced healthy 1500 calorie menu so you can follow it for as long as you need!    Day 1    Breakfast - 2 whole grain toast, 1 tablespoon of jelly, 1 teaspoon of butter, 1 cup of tea or coffee,   cup of orange juice.  Snack -   bagel, 1 cup of yogurt.  Lunch - 1 oz of sliced turkey or chicken breast, 1 tossed vegetable salad with 1 tablespoon of olive oil and lemon juice, 1 whole grain roll.  Snack -   cup of fresh strawberries, 1 cup of yogurt, 1 tablespoon of granola cereal.  Dinner - 3 oz of beef, grilled or broiled, 1 cup of rice, 1 teaspoon of butter,   cup of steamed carrots, mixed green salad with olive oil and lemon juice.  Snack - 1 apple or orange.    Day 2    Breakfast - 1 orange, 1 cup of whole grain cereal, 1 cup of milk, 1 cup of strawberries.  Snack - 2 teaspoons of peanut butter, 2 rice cakes.  Lunch - 1 cup of vegetable  soup, 1 oz of mozzarella cheese, 1 mixed greens salad with olive oil and lemon juice, 1 cup of yogurt, few whole grain crackers.  Snack - 1 apple.  Dinner - 5 oz of white fish, baked, broiled or grilled, 1 baked potato, 1 cup of steamed broccoli, mixed greens salad with oil and lemon juice, 1 whole grain roll.  Snack - 3 cups of plain popcorn.    Day 3    Breakfast - 2 pancakes with 1 tablespoon of maple syrup or fruit spread.  Snack - 1 cup of milk, 1 peach.  Lunch - 6 oz of fish, grilled or broiled, mixed greens salad with 1 tablespoon of olive oil and lemon juice, 1 apple, and few whole grain crackers.  Snack - 1 granola bar.  Dinner - 2 cup of whole grain pasta,   cup of tomato sauce, mixed greens salad with olive oil and lemon juice.  Snack - 1 cup of milk, few peanuts.    Day 4    Breakfast -   cup of oatmeal, cooked with 1 teaspoons of brown sugar, 1 cup of milk, 1 orange.  Snack - 1 apple, 2 oz of almonds.  Lunch - 1 oz of sliced chicken or turkey breast, 1 teaspoon of mustard, 1 slices of whole wheat bread, 2 slices of tomato, 1   cup of sliced raw vegetables.  Snack -   cup of milk, 1 cup of strawberries.  Dinner - 3 oz skinless chicken breast, baked, broiled or grilled, 1 medium baked potato, 2 teaspoons of butter, mixed greens salad with 1 tablespoon of olive oil and lemon juice.  Snack - 1/2 cup of cottage cheese, 1 pear.    Day 5    Breakfast - 1 whole wheat bagel, 1 table spoon of cream cheese, 1 cup of orange juice.  Snack - 1 cup of yogurt, 1 apple.  Lunch - 2 oz lean hamburger, grilled or broiled, 1 cup of steamed asparagus, large tossed vegetable salad with yogurt dressing,   cup of cottage cheese.  Dinner - 2 cup of pasta with 3 oz of cooked shrimp and 1 /2 cup of broccoli, 1 slice of Italian bread, 1 teaspoon of garlic olive oil, mixed greens salad with oil and vinegar.    Day 6    Breakfast - 1 poached egg, 1 tomato, 1 whole wheat muffin,   grapefruit.  Snack - 1 cup of fruit salad, 1 cup of  yogurt, 1 granola bar.  Lunch - 3 oz of sliced turkey or chicken breast, 1 gretel bread, 1 cup of sliced carrots and celery.  Snack - 1 peach,   cup of cottage cheese.  Dinner - 3 oz of cheese, few crackers, 1 mixed greens salad with olive oil and lemon juice, 1 glass of dry red wine.  Snack - 1 cup of fresh strawberries.    Day 7    Breakfast - 1 whole wheat toast, 1 hard boiled egg,   cup of blueberries, 1 cup of yogurt.  Snack - 1 pear, 1 oz of pretzels.  Lunch - 4 oz of cheese, 1 large vegetable salad with olive oil and lemon juice, 1 cup of milk.  Snack -   cup of fruit salad, 1 granola bar.  Dinner - 3 oz of broiled or baked white fish, 1   cup of rice, 1 cup of steamed vegetables, 2 teaspoons of butter.        Thank you for choosing [facility].  You may be receiving a survey in the mail from [facility] regarding your visit today.  Please take a few minutes to complete and return the survey to let us know how we are doing.      If you have questions or concerns, please contact us via [employer] or you can contact your care team at [phone].    Our Clinic hours are:  Monday 6:40 am  to 7:00 pm  Tuesday -Friday 6:40 am to 5:00 pm    The [place] outpatient lab hours are:  Monday - Friday 6:10 am to 4:45 pm  Saturdays 7:00 am to 11:00 am  Appointments are required, call [phone]    If you have clinical questions after hours or would like to schedule an appointment,  call the clinic at [phone].  Self-Care for Sinusitis     Drinking plenty of water can help sinuses drain.   Sinusitis can often be managed with self-care. Self-care can keep sinuses moist and make you feel more comfortable. Remember to follow your doctor's instructions closely. This can make a big difference in getting your sinus problem under control.  Drink fluids  Drinking extra fluids helps thin your mucus. This lets it drain from your sinuses more easily. Have a glass of water every hour or two. A humidifier helps in much the same  way. Fluids can also offset the drying effects of certain medicines. If you use a humidifier, follow the product maker's instructions on how to use it. Clean it on a regular schedule.  Use saltwater rinses  Rinses help keep your sinuses and nose moist. Mix a teaspoon of salt in 8 ounces of fresh, warm water. Use a bulb syringe to gently squirt the water into your nose a few times a day. You can also buy ready-made saline nasal sprays.  Apply hot or cold packs  Applying heat to the area surrounding your sinuses may make you feel more comfortable. Use a hot water bottle or a hand towel dipped in hot water. Some people also find ice packs effective for relieving pain.  Medicines  Your doctor may prescribe medications to help treat your sinusitis. If you have an infection, antibiotics can help clear it up. If you are prescribed antibiotics, take all pills on schedule until they are gone, even if you feel better. Decongestants help relieve swelling. Use decongestant sprays for short periods only under the direction of your doctor. If you have allergies, your doctor may prescribe medications to help relieve them.   Date Last Reviewed: 10/1/2016    4650-0624 [employer]. [address] [place] [address]. All rights reserved. This information is not intended as a substitute for professional medical care. Always follow your healthcare professional's instructions.        Sinusitis (Antibiotic Treatment)    The sinuses are air-filled spaces within the bones of the face. They connect to the inside of the nose. Sinusitis is an inflammation of the tissue that lines the sinuses. Sinusitis can occur during a cold. It can also happen due to allergies to pollens and other particles in the air. Sinusitis can cause symptoms of sinus congestion and a feeling of fullness. A sinus infection causes fever, headache, and facial pain. There is often green or yellow fluid draining from the nose or into the back of the  throat (post-nasal drip). You have been given antibiotics to treat this condition.  Home care    Take the full course of antibiotics as instructed. Do not stop taking them, even when you feel better.    Drink plenty of water, hot tea, and other liquids. This may help thin nasal mucus. It also may help your sinuses drain fluids.    Heat may help soothe painful areas of your face. Use a towel soaked in hot water. Or,  the shower and direct the warm spray onto your face. Using a vaporizer along with a menthol rub at night may also help soothe symptoms.     An expectorant with guaifenesin may help thin nasal mucus and help your sinuses drain fluids.    You can use an over-the-counter decongestant, unless a similar medicine was prescribed to you. Nasal sprays work the fastest. Use one that contains phenylephrine or oxymetazoline. First blow your nose gently. Then use the spray. Do not use these medicines more often than directed on the label. If you do, your symptoms may get worse. You may also take pills that contain pseudoephedrine. Don t use products that combine multiple medicines. This is because side effects may be increased. Read labels. You can also ask the pharmacist for help. (People with high blood pressure should not use decongestants. They can raise blood pressure.)    Over-the-counter antihistamines may help if allergies contributed to your sinusitis.      Do not use nasal rinses or irrigation during an acute sinus infection, unless your healthcare provider tells you to. Rinsing may spread the infection to other areas in your sinuses.    Use acetaminophen or ibuprofen to control pain, unless another pain medicine was prescribed to you. If you have chronic liver or kidney disease or ever had a stomach ulcer, talk with your healthcare provider before using these medicines. (Aspirin should never be taken by anyone under age 18 who is ill with a fever. It may cause severe liver damage.)    Don't smoke.  This can make symptoms worse.  Follow-up care  Follow up with your healthcare provider or our staff if you are better in 1 week.  When to seek medical advice  Call your healthcare provider if any of these occur:    Facial pain or headache that gets worse    Stiff neck    Unusual drowsiness or confusion    Swelling of your forehead or eyelids    Vision problems, such as blurred or double vision    Fever of 100.4 F (38 C) or higher, or as directed by your healthcare provider    Seizure    Breathing problems    Symptoms don't go away in 10 days  Prevention  Here are steps you can take to help prevent an infection:    Keep good hand washing habits.    Don t have close contact with people who have sore throats, colds, or other upper respiratory infections.    Don t smoke, and stay away from secondhand smoke.    Stay up to date with of your vaccines.  Date Last Reviewed: 11/1/2017 2000-2017 [employer]. [address] [place] [address]. All rights reserved. This information is not intended as a substitute for professional medical care. Always follow your healthcare professional's instructions.                Follow-ups after your visit        Additional Services     GASTROENTEROLOGY ADULT REF PROCEDURE ONLY [facility] [phone]       Last Lab Result: Creatinine (mg/dL)       Date                     Value                 11/14/2012               0.72             ----------  There is no height or weight on file to calculate BMI.     Needed:  No  Language:  English    Patient will be contacted to schedule procedure.     Please be aware that coverage of these services is subject to the terms and limitations of your health insurance plan.  Call member services at your health plan with any benefit or coverage questions.  Any procedures must be performed at a [place] facility OR coordinated by your clinic's referral office.    Please bring the following with you to your  "appointment:    (1) Any X-Rays, CTs or MRIs which have been performed.  Contact the facility where they were done to arrange for  prior to your scheduled appointment.    (2) List of current medications   (3) This referral request   (4) Any documents/labs given to you for this referral                  Future tests that were ordered for you today     Open Future Orders        Priority Expected Expires Ordered    CT Chest Lung Cancer Scrn Low Dose wo Routine  10/10/2019 10/10/2018            Who to contact     If you have questions or need follow up information about today's clinic visit or your schedule please contact [facility] directly at [phone].  Normal or non-critical lab and imaging results will be communicated to you by MyChart, letter or phone within 4 business days after the clinic has received the results. If you do not hear from us within 7 days, please contact the clinic through MyChart or phone. If you have a critical or abnormal lab result, we will notify you by phone as soon as possible.  Submit refill requests through [facility] or call your pharmacy and they will forward the refill request to us. Please allow 3 business days for your refill to be completed.          Additional Information About Your Visit        Care EveryWhere ID     This is your Care EveryWhere ID. This could be used by other organizations to access your [place] medical records  [id number]        Your Vitals Were     Pulse Temperature Respirations Height Last Period Pulse Oximetry    71 96.8  F (36  C) (Tympanic) 16 5' 3\" (1.6 m) 09/11/2001 97%    BMI (Body Mass Index)                   25.93 kg/m2            Blood Pressure from Last 3 Encounters:   10/10/18 118/82   06/06/18 122/84   04/04/18 118/88    Weight from Last 3 Encounters:   10/10/18 146 lb 6.4 oz (66.4 kg)   06/06/18 148 lb (67.1 kg)   04/04/18 148 lb (67.1 kg)              We Performed the Following     GASTROENTEROLOGY ADULT REF PROCEDURE " ONLY [facility] [phone]          Today's Medication Changes          These changes are accurate as of 10/10/18  7:17 AM.  If you have any questions, ask your nurse or doctor.               Start taking these medicines.        Dose/Directions    azithromycin 250 MG tablet   Commonly known as:  ZITHROMAX   Used for:  Acute recurrent maxillary sinusitis   Started by:  Blair Saldaña MD        Two tablets first day, then one tablet daily for four days.   Quantity:  6 tablet   Refills:  0         Stop taking these medicines if you haven't already. Please contact your care team if you have questions.     ipratropium 17 MCG/ACT Inhaler   Commonly known as:  ATROVENT HFA   Stopped by:  Blair Saldaña MD           predniSONE 20 MG tablet   Commonly known as:  DELTASONE   Stopped by:  Blair Saldaña MD           PREVACID PO   Stopped by:  Blair Saldaña MD                Where to get your medicines      These medications were sent to [place] [address] [place] [facility] - [address] [address]     Phone:  [phone]     azithromycin 250 MG tablet                Primary Care Provider Office Phone # Fax #    Lakisha BARNETT Masha [phone]       [address] [address]        Equal Access to Services     ABDIAZIZ NOVAK AH: Hadii dell ku hadasho Soomaali, waaxda luqadaha, qaybta kaalmada adeegyada, waxay idiin hayaan manuel breaux. So [facility] [phone].    ATENCIÓN: Si habla español, tiene a colorado disposición servicios gratuitos de asistencia lingüística. [facility] [phone].    We comply with applicable federal civil rights laws and [place] laws. We do not discriminate on the basis of race, color, national origin, age, disability, sex, sexual orientation, or gender identity.            Thank you!     Thank you for choosing [facility]  for your care. Our goal is always to provide  you with excellent care. Hearing back from our patients is one way we can continue to improve our services. Please take a few minutes to complete the written survey that you may receive in the mail after your visit with us. Thank you!             Your Updated Medication List - Protect others around you: Learn how to safely use, store and throw away your medicines at www.disposemymeds.org.          This list is accurate as of 10/10/18  7:17 AM.  Always use your most recent med list.                   Brand Name Dispense Instructions for use Diagnosis    albuterol 108 (90 Base) MCG/ACT inhaler    PROAIR HFA/PROVENTIL HFA/VENTOLIN HFA    3 Inhaler    Inhale 2 puffs into the lungs every 6 hours as needed for shortness of breath / dyspnea.        ANTIVERT 25 MG tablet   Generic drug:  meclizine     90 tablet    Take 0.5 tablets (12.5 mg) by mouth daily        azithromycin 250 MG tablet    ZITHROMAX    6 tablet    Two tablets first day, then one tablet daily for four days.    Acute recurrent maxillary sinusitis       B-12 1000 MCG Caps           citrate of magnesia Soln      Take 296 mLs by mouth        fluticasone 50 MCG/ACT spray    FLONASE     2 sprays        glucosamine complex Tabs      Take  by mouth.        ipratropium - albuterol 0.5 mg/2.5 mg/3 mL 0.5-2.5 (3) MG/3ML neb solution    DUONEB    30 vial    Take 3 mLs by nebulization every 6 hours as needed for shortness of breath / dyspnea.        LORazepam 1 MG tablet    ATIVAN    4 tablet    Take 0.5 tablets by mouth. Up to 3 times daily, for torticolis.        Multi-vitamin Tabs tablet   Generic drug:  multivitamin, therapeutic with minerals     100 tablet    Take 1 tablet by mouth daily.        omeprazole 20 MG tablet     30 tablet    Take 20 mg by mouth. 1-2 daily        sodium chloride 0.65 % nasal spray    OCEAN     as needed        tiotropium 18 MCG capsule    SPIRIVA     Inhale contents of 1 cap once daily using handihaler device        TYLENOL 325 MG tablet    Generic drug:  acetaminophen      2 TABLETS BY MOUTH EVERY 4 HOURS AS NEEDED, DO NOT EXCEED 4,000 MG OF ACETAMINOPHEN IN 24 HOURS        Vitamin C 100 MG Tabs

## 2018-10-10 NOTE — PROGRESS NOTES
SUBJECTIVE:   Dasia Reynolds is a 63 year old female who presents to clinic today for the following health issues:      ENT Symptoms             Symptoms: cc Present Absent Comment   Fever/Chills   x    Fatigue  x     Muscle Aches   x    Eye Irritation  x  dry   Sneezing  x  some   Nasal Fred/Drg  x     Sinus Pressure/Pain  x     Loss of smell  x     Dental pain  x     Sore Throat  x     Swollen Glands   x    Ear Pain/Fullness  x  right   Cough   x    Wheeze  x     Chest Pain   x    Shortness of breath  x     Rash   x    Other  x  headache     Symptom duration:  2-3 weeks   Symptom severity:  moderate   Treatments tried:  tylenol, rosa pot, advil, sudafed   Contacts:  none       Has COPD and this started few weeks ago with congestion and chest tightness and wheezing.  Feels like needs to cough up stuff but that is rare that it actually comes up.  Doing the Spiriva.  Has nebulizer to use and has done this rarely if needed, not with this illness.  Has had right ear sac enhancement for Meniere's.   Has history sinus infections,       History   Smoking Status     Former Smoker     Packs/day: 0.50     Years: 40.00     Types: Cigarettes     Quit date: 11/18/2012   Smokeless Tobacco     Never Used     No results found for: FEV1, ZAS1YCI    Problem list and histories reviewed & adjusted, as indicated.  Additional history: as documented    BP Readings from Last 3 Encounters:   10/10/18 118/82   06/06/18 122/84   04/04/18 118/88    Wt Readings from Last 3 Encounters:   10/10/18 146 lb 6.4 oz (66.4 kg)   06/06/18 148 lb (67.1 kg)   04/04/18 148 lb (67.1 kg)                    Reviewed and updated as needed this visit by clinical staff  Tobacco  Allergies  Meds  Med Hx  Surg Hx  Fam Hx  Soc Hx      Reviewed and updated as needed this visit by Provider         ROS:  Constitutional, HEENT, cardiovascular, pulmonary, gi and gu systems are negative, except as otherwise noted.    OBJECTIVE:     /82  Pulse 71  Temp  "96.8  F (36  C) (Tympanic)  Resp 16  Ht 5' 3\" (1.6 m)  Wt 146 lb 6.4 oz (66.4 kg)  LMP 09/11/2001  SpO2 97%  BMI 25.93 kg/m2  Body mass index is 25.93 kg/(m^2).  GENERAL: healthy, alert and no distress  NECK: no adenopathy, no asymmetry, masses, or scars and thyroid normal to palpation  RESP: lungs clear to auscultation - no rales, rhonchi or wheezes  CV: regular rate and rhythm, normal S1 S2, no S3 or S4, no murmur, click or rub, no peripheral edema and peripheral pulses strong  ABDOMEN: soft, nontender, no hepatosplenomegaly, no masses and bowel sounds normal  MS: no gross musculoskeletal defects noted, no edema    Diagnostic Test Results:  none     ASSESSMENT/PLAN:       Dasia was seen today for sinus problem, patient request and health maintenance.    Diagnoses and all orders for this visit:    Acute recurrent maxillary sinusitis: treat due to timing of 2-3 weeks and not improving  -will also help COPD (not currently exacerbated)  -     azithromycin (ZITHROMAX) 250 MG tablet; Two tablets first day, then one tablet daily for four days.    Chronic obstructive pulmonary disease, unspecified COPD type (H): stable with Spiriva. No exacerbation since 2016.    Special screening for malignant neoplasms, colon  -     GASTROENTEROLOGY ADULT REF PROCEDURE ONLY [facility] [phone]    Advanced directives, counseling/discussion    Pulmonary nodule: 4mm stable nodule seen in 2015.  -     CT Chest Lung Cancer Scrn Low Dose wo; Future        Patient Instructions     Please call to schedule the low dose -[phone]    Call to schedule the colonoscopy    One pound is 3500 calories.  So to lose one pound per week need to cut out 500 calories per day or 3500 calories per week.  Do not count exercise toward or against your calories.  Write down everything that you eat and count calories or use an shaylee like Lose It or MyFitness Pal or website like [employer] or MyPlate or Calorie Ed    You likely need to be " closer to 1200 angela per day   Below is a 1500 Calorie Menu: This is a well balanced healthy 1500 calorie menu so you can follow it for as long as you need!    Day 1    Breakfast - 2 whole grain toast, 1 tablespoon of jelly, 1 teaspoon of butter, 1 cup of tea or coffee,   cup of orange juice.  Snack -   bagel, 1 cup of yogurt.  Lunch - 1 oz of sliced turkey or chicken breast, 1 tossed vegetable salad with 1 tablespoon of olive oil and lemon juice, 1 whole grain roll.  Snack -   cup of fresh strawberries, 1 cup of yogurt, 1 tablespoon of granola cereal.  Dinner - 3 oz of beef, grilled or broiled, 1 cup of rice, 1 teaspoon of butter,   cup of steamed carrots, mixed green salad with olive oil and lemon juice.  Snack - 1 apple or orange.    Day 2    Breakfast - 1 orange, 1 cup of whole grain cereal, 1 cup of milk, 1 cup of strawberries.  Snack - 2 teaspoons of peanut butter, 2 rice cakes.  Lunch - 1 cup of vegetable soup, 1 oz of mozzarella cheese, 1 mixed greens salad with olive oil and lemon juice, 1 cup of yogurt, few whole grain crackers.  Snack - 1 apple.  Dinner - 5 oz of white fish, baked, broiled or grilled, 1 baked potato, 1 cup of steamed broccoli, mixed greens salad with oil and lemon juice, 1 whole grain roll.  Snack - 3 cups of plain popcorn.    Day 3    Breakfast - 2 pancakes with 1 tablespoon of maple syrup or fruit spread.  Snack - 1 cup of milk, 1 peach.  Lunch - 6 oz of fish, grilled or broiled, mixed greens salad with 1 tablespoon of olive oil and lemon juice, 1 apple, and few whole grain crackers.  Snack - 1 granola bar.  Dinner - 2 cup of whole grain pasta,   cup of tomato sauce, mixed greens salad with olive oil and lemon juice.  Snack - 1 cup of milk, few peanuts.    Day 4    Breakfast -   cup of oatmeal, cooked with 1 teaspoons of brown sugar, 1 cup of milk, 1 orange.  Snack - 1 apple, 2 oz of almonds.  Lunch - 1 oz of sliced chicken or turkey breast, 1 teaspoon of mustard, 1 slices of whole wheat  bread, 2 slices of tomato, 1   cup of sliced raw vegetables.  Snack -   cup of milk, 1 cup of strawberries.  Dinner - 3 oz skinless chicken breast, baked, broiled or grilled, 1 medium baked potato, 2 teaspoons of butter, mixed greens salad with 1 tablespoon of olive oil and lemon juice.  Snack - 1/2 cup of cottage cheese, 1 pear.    Day 5    Breakfast - 1 whole wheat bagel, 1 table spoon of cream cheese, 1 cup of orange juice.  Snack - 1 cup of yogurt, 1 apple.  Lunch - 2 oz lean hamburger, grilled or broiled, 1 cup of steamed asparagus, large tossed vegetable salad with yogurt dressing,   cup of cottage cheese.  Dinner - 2 cup of pasta with 3 oz of cooked shrimp and 1 /2 cup of broccoli, 1 slice of Italian bread, 1 teaspoon of garlic olive oil, mixed greens salad with oil and vinegar.    Day 6    Breakfast - 1 poached egg, 1 tomato, 1 whole wheat muffin,   grapefruit.  Snack - 1 cup of fruit salad, 1 cup of yogurt, 1 granola bar.  Lunch - 3 oz of sliced turkey or chicken breast, 1 gretel bread, 1 cup of sliced carrots and celery.  Snack - 1 peach,   cup of cottage cheese.  Dinner - 3 oz of cheese, few crackers, 1 mixed greens salad with olive oil and lemon juice, 1 glass of dry red wine.  Snack - 1 cup of fresh strawberries.    Day 7    Breakfast - 1 whole wheat toast, 1 hard boiled egg,   cup of blueberries, 1 cup of yogurt.  Snack - 1 pear, 1 oz of pretzels.  Lunch - 4 oz of cheese, 1 large vegetable salad with olive oil and lemon juice, 1 cup of milk.  Snack -   cup of fruit salad, 1 granola bar.  Dinner - 3 oz of broiled or baked white fish, 1   cup of rice, 1 cup of steamed vegetables, 2 teaspoons of butter.        Thank you for choosing [facility].  You may be receiving a survey in the mail from [facility] regarding your visit today.  Please take a few minutes to complete and return the survey to let us know how we are doing.      If you have questions or concerns, please contact us via [employer] or you  can contact your care team at [phone].    Our Clinic hours are:  Monday 6:40 am  to 7:00 pm  Tuesday -Friday 6:40 am to 5:00 pm    The [place] outpatient lab hours are:  Monday - Friday 6:10 am to 4:45 pm  Saturdays 7:00 am to 11:00 am  Appointments are required, call [phone]    If you have clinical questions after hours or would like to schedule an appointment,  call the clinic at [phone].  Self-Care for Sinusitis     Drinking plenty of water can help sinuses drain.   Sinusitis can often be managed with self-care. Self-care can keep sinuses moist and make you feel more comfortable. Remember to follow your doctor's instructions closely. This can make a big difference in getting your sinus problem under control.  Drink fluids  Drinking extra fluids helps thin your mucus. This lets it drain from your sinuses more easily. Have a glass of water every hour or two. A humidifier helps in much the same way. Fluids can also offset the drying effects of certain medicines. If you use a humidifier, follow the product maker's instructions on how to use it. Clean it on a regular schedule.  Use saltwater rinses  Rinses help keep your sinuses and nose moist. Mix a teaspoon of salt in 8 ounces of fresh, warm water. Use a bulb syringe to gently squirt the water into your nose a few times a day. You can also buy ready-made saline nasal sprays.  Apply hot or cold packs  Applying heat to the area surrounding your sinuses may make you feel more comfortable. Use a hot water bottle or a hand towel dipped in hot water. Some people also find ice packs effective for relieving pain.  Medicines  Your doctor may prescribe medications to help treat your sinusitis. If you have an infection, antibiotics can help clear it up. If you are prescribed antibiotics, take all pills on schedule until they are gone, even if you feel better. Decongestants help relieve swelling. Use decongestant sprays for short periods only under the direction  of your doctor. If you have allergies, your doctor may prescribe medications to help relieve them.   Date Last Reviewed: 10/1/2016    3875-0820 [employer]. [address] [place] [address], [place] [address]. All rights reserved. This information is not intended as a substitute for professional medical care. Always follow your healthcare professional's instructions.        Sinusitis (Antibiotic Treatment)    The sinuses are air-filled spaces within the bones of the face. They connect to the inside of the nose. Sinusitis is an inflammation of the tissue that lines the sinuses. Sinusitis can occur during a cold. It can also happen due to allergies to pollens and other particles in the air. Sinusitis can cause symptoms of sinus congestion and a feeling of fullness. A sinus infection causes fever, headache, and facial pain. There is often green or yellow fluid draining from the nose or into the back of the throat (post-nasal drip). You have been given antibiotics to treat this condition.  Home care    Take the full course of antibiotics as instructed. Do not stop taking them, even when you feel better.    Drink plenty of water, hot tea, and other liquids. This may help thin nasal mucus. It also may help your sinuses drain fluids.    Heat may help soothe painful areas of your face. Use a towel soaked in hot water. Or,  the shower and direct the warm spray onto your face. Using a vaporizer along with a menthol rub at night may also help soothe symptoms.     An expectorant with guaifenesin may help thin nasal mucus and help your sinuses drain fluids.    You can use an over-the-counter decongestant, unless a similar medicine was prescribed to you. Nasal sprays work the fastest. Use one that contains phenylephrine or oxymetazoline. First blow your nose gently. Then use the spray. Do not use these medicines more often than directed on the label. If you do, your symptoms may get worse. You may also take pills that  contain pseudoephedrine. Don t use products that combine multiple medicines. This is because side effects may be increased. Read labels. You can also ask the pharmacist for help. (People with high blood pressure should not use decongestants. They can raise blood pressure.)    Over-the-counter antihistamines may help if allergies contributed to your sinusitis.      Do not use nasal rinses or irrigation during an acute sinus infection, unless your healthcare provider tells you to. Rinsing may spread the infection to other areas in your sinuses.    Use acetaminophen or ibuprofen to control pain, unless another pain medicine was prescribed to you. If you have chronic liver or kidney disease or ever had a stomach ulcer, talk with your healthcare provider before using these medicines. (Aspirin should never be taken by anyone under age 18 who is ill with a fever. It may cause severe liver damage.)    Don't smoke. This can make symptoms worse.  Follow-up care  Follow up with your healthcare provider or our staff if you are better in 1 week.  When to seek medical advice  Call your healthcare provider if any of these occur:    Facial pain or headache that gets worse    Stiff neck    Unusual drowsiness or confusion    Swelling of your forehead or eyelids    Vision problems, such as blurred or double vision    Fever of 100.4 F (38 C) or higher, or as directed by your healthcare provider    Seizure    Breathing problems    Symptoms don't go away in 10 days  Prevention  Here are steps you can take to help prevent an infection:    Keep good hand washing habits.    Don t have close contact with people who have sore throats, colds, or other upper respiratory infections.    Don t smoke, and stay away from secondhand smoke.    Stay up to date with of your vaccines.  Date Last Reviewed: 11/1/2017 2000-2017 [employer]. [address] [place] [address], [place] [address]. All rights reserved. This information is not intended as a  substitute for professional medical care. Always follow your healthcare professional's instructions.            Blair Saldaña MD  [facility]

## 2018-10-31 NOTE — PROGRESS NOTES
06/20/18   Note: This is a copy of patient's last daily visit and will also serve as their Discharge Summary as they have not been in for further treatment 30 days past this date. Final assessment of goals and physical and functional status , therefore unavailable.   Notes   Note Type Progress Note   Providers   Providers RACHID Dean/L, CHT   Referring Physician Dr. Craig   Reporting Period   Reporting period from 04/11/18   Reporting period to 06/20/18   General Information   Rxs Authorized ?   Rxs Used 2   Medical Diagnosis Closed nondisplaced fracture of neck of right radius    Orders Evaluate And Treat As Indicated   Insurance WC   Start Of Care Date 04/11/18   Onset date of current episode/exacerbation 01/10/18   Date for Next MD Appointment 06/11/18   Precautions/Limitations 10# weight restriction   Subjective Measures   Subjective Can do most things. Still trouble opening a jar   Patient Reported % Functional Improvement ULDQ 75 was 72   Objective Measures   Objective Measures Strength;Objective Measure 1   Strength    42#,38#   Objective Measure 1   Objective Measure has full ROM and near full strength   Modalities   Modalities Ultrasound   Ultrasound -Type Pulsed 50%;5 cm sound head   Skilled Interventions To Increase Blood Flow For Improved Healing;To Decrease Pain   Intensity 1.0w/cm2   Duration (does not include the 3-5 min set up/clean up time) 8 min   Frequency 3 MHz   Location forearm extensors   Therapeutic Exercise   Therapeutic Exercise Strengthening   Skilled Interventions To Increase Strength  (verval and physical cuing needed)   Minutes of Treatment 16   Other Exer/Activities/Educ   Other Exer/Activities/Educ - All exercises are part of HEP unless otherwise noted Exercise 1;Exercise 2;Exercise 3   Exercise 1 Strething prior and after ex   Description 1 HEP insturction   Exercise 2 instructed in light Cardio for 5-10 min prior to weight activities   Description 2 red theraband  ex biceps, wxtension and rows 2x10 each   Exercise 3 pain free x 10   Description 3 scar retraction ex for posture   Strengthening   Isotonics Wrist Extension;RD   Sets/Reps 1 set;10 reps;reviewed;HEP   Resistance 8 oz   Isotonics Forearm All Planes   Sets/Reps 1 set;10 reps   Resistance 1   Isotonics Elbow All Planes   Sets/Reps 1 set;10 reps   Resistance 1   Isotonics Shoulder All Planes   Sets/Reps 1 set;10 reps   Resistance 1   Manual   Manual STM   Skilled Interventions To Decrease Pain   Minutes of Treatment 8   STM Extensors   Description/ Technique mod pressure with Tigerbalm and HEP instruction   Hand Goals   Hand Goals Enter Other Activity Here   Assessment   Clinical Impression(s) Comments Patient doing very well with home program and has some new strategies to progress on her own.   Education   Learner Patient   Readiness Eager   Method Booklet/handout;Explanation;Demonstration   Response Verbalizes understanding;Demonstrates understanding   Education Notes see home program.   Plan   Home program ex as above   Plan Patient would like to try at home. Will hold chart open for one one incase she needs to come for further instruction and or upgrade   Total Session Time   Zuleika Norwood, PRISCILLAR/L CHT  Occupational Therapist, Certified Hand Therapist

## 2018-10-31 NOTE — ADDENDUM NOTE
Encounter addended by: Zuleika Norwood OT on: 10/31/2018  2:48 PM<BR>     Actions taken: Sign clinical note

## 2018-11-08 ENCOUNTER — HOSPITAL ENCOUNTER (OUTPATIENT)
Dept: CT IMAGING | Facility: CLINIC | Age: 63
Discharge: HOME | End: 2018-11-08
Attending: FAMILY MEDICINE | Admitting: FAMILY MEDICINE
Payer: COMMERCIAL

## 2018-11-08 DIAGNOSIS — R91.1 PULMONARY NODULE: ICD-10-CM

## 2018-11-08 PROCEDURE — G0297 LDCT FOR LUNG CA SCREEN: HCPCS

## 2019-02-18 ENCOUNTER — APPOINTMENT (OUTPATIENT)
Dept: GENERAL RADIOLOGY | Facility: CLINIC | Age: 64
End: 2019-02-18
Attending: NURSE PRACTITIONER
Payer: COMMERCIAL

## 2019-02-18 ENCOUNTER — HOSPITAL ENCOUNTER (EMERGENCY)
Facility: CLINIC | Age: 64
Discharge: HOME | End: 2019-02-18
Attending: NURSE PRACTITIONER | Admitting: NURSE PRACTITIONER
Payer: COMMERCIAL

## 2019-02-18 VITALS — TEMPERATURE: 98.3 F | SYSTOLIC BLOOD PRESSURE: 132 MMHG | OXYGEN SATURATION: 95 % | DIASTOLIC BLOOD PRESSURE: 95 MMHG

## 2019-02-18 DIAGNOSIS — M25.461 PAIN AND SWELLING OF RIGHT KNEE: ICD-10-CM

## 2019-02-18 DIAGNOSIS — M25.561 PAIN AND SWELLING OF RIGHT KNEE: ICD-10-CM

## 2019-02-18 DIAGNOSIS — M25.561 CHRONIC PAIN OF RIGHT KNEE: ICD-10-CM

## 2019-02-18 DIAGNOSIS — G89.29 CHRONIC PAIN OF RIGHT KNEE: ICD-10-CM

## 2019-02-18 PROCEDURE — 29505 APPLICATION LONG LEG SPLINT: CPT | Mod: RT | Performed by: NURSE PRACTITIONER

## 2019-02-18 PROCEDURE — 73562 X-RAY EXAM OF KNEE 3: CPT | Mod: RT

## 2019-02-18 PROCEDURE — 99214 OFFICE O/P EST MOD 30 MIN: CPT | Mod: Z6 | Performed by: NURSE PRACTITIONER

## 2019-02-18 PROCEDURE — G0463 HOSPITAL OUTPT CLINIC VISIT: HCPCS | Mod: 25 | Performed by: NURSE PRACTITIONER

## 2019-02-18 ASSESSMENT — ENCOUNTER SYMPTOMS
CHILLS: 0
FEVER: 0
HEADACHES: 0
CONSTIPATION: 0
NAUSEA: 0
JOINT SWELLING: 1
COUGH: 0
WHEEZING: 0
DIARRHEA: 0
SHORTNESS OF BREATH: 0
EYE REDNESS: 0
CONFUSION: 0
DIAPHORESIS: 0
ABDOMINAL PAIN: 0
DIFFICULTY URINATING: 0
DYSURIA: 0
VOMITING: 0

## 2019-02-18 NOTE — ED AVS SNAPSHOT
[facility]  [address] [facility] [address]  Phone:  [phone]  Fax:  [phone]                                    Dasia Reynolds   MRN: [id number]    Department:  [facility]   Date of Visit:  2/18/2019           After Visit Summary Signature Page    I have received my discharge instructions, and my questions have been answered. I have discussed any challenges I see with this plan with the nurse or doctor.    ..........................................................................................................................................  Patient/Patient Representative Signature      ..........................................................................................................................................  Patient Representative Print Name and Relationship to Patient    ..................................................               ................................................  Date                                   Time    ..........................................................................................................................................  Reviewed by Signature/Title    ...................................................              ..............................................  Date                                               Time          22EPIC Rev 08/18

## 2019-02-18 NOTE — DISCHARGE INSTRUCTIONS
Start taking 2 extra strength Tylenol with 2 ibuprofen every 6 hours for pain management.  Wear the hinged knee brace.  Stop doing Ny classes temporarily.  Instead start walking on a treadmill or exercising on a bike or walking in a pool chest deep  Follow-up with orthopedics for further evaluation of your knee pain.

## 2019-02-18 NOTE — ED PROVIDER NOTES
History     Chief Complaint   Patient presents with     Knee Injury     right knee pain X2 weeks. Did fall on ice 2 weeks ago.      HPI  Dasia Reynolds is a 63 year old female who admits to past medical history of Ménière's disease, COPD, arthritis presenting to urgent care with right-sided knee pain for the past 4 weeks.  Patient states 1 month ago she was wearing high heel boots and she slipped and subsequently twisted her right knee.  Patient reports immediate pain with slow improvement over time period of 2 weeks.  Patient states then 2 weeks ago she slipped and fell on ice with worsening of her knee pain.  Patient states that her right knee has subsequently been intermittently swollen and the pain starts right below the patella or in the shin area and extends up to her lower thigh area.  Patient reports that ice can be helpful at times; patient reports walking is decrease her pain.  Patient denies any history of knee injuries or chronic knee pain.  Patient has taken Tylenol 1000 mg 3 times a day one day and then the next day she would take ibuprofen 1-2 tablets 3 times a day for pain management.  Patient states the pills help.  Patient denies any fever, aches, chills, sweats.  Patient denies any redness to the joint.    Allergies:  Allergies   Allergen Reactions     Augmentin      Vertigo/vomiting     Morphine Itching     Sulfa Drugs      Headache         Problem List:    Patient Active Problem List    Diagnosis Date Noted     Pulmonary nodule 01/21/2015     Priority: Medium     COPD (chronic obstructive pulmonary disease) (H) 02/27/2013     Priority: Medium     Advanced directives, counseling/discussion 05/04/2012     Priority: Medium     Discussed advance care planning with patient; information given to patient to review. 5/4/2012          Meniere's disease 09/07/2007     Priority: Medium        Past Medical History:    History reviewed. No pertinent past medical history.    Past Surgical History:    Past  Surgical History:   Procedure Laterality Date     CHOLECYSTECTOMY         Family History:    Family History   Problem Relation Age of Onset     Respiratory Father         emphysema     Alzheimer Disease Maternal Grandmother      Cancer Maternal Grandfather         pancreatic cancer     Cancer Paternal Grandfather         lung     Gastrointestinal Disease Sister         diverticulis     Alzheimer Disease Mother        Social History:  Marital Status:   [2]  Social History     Tobacco Use     Smoking status: Former Smoker     Packs/day: 0.50     Years: 40.00     Pack years: 20.00     Types: Cigarettes     Last attempt to quit: 2012     Years since quittin.2     Smokeless tobacco: Never Used   Substance Use Topics     Alcohol use: Yes     Comment: very little     Drug use: No        Medications:      albuterol (PROVENTIL HFA: VENTOLIN HFA) 108 (90 BASE) MCG/ACT inhaler   azithromycin (ZITHROMAX) 250 MG tablet   LORazepam (ATIVAN) 1 MG tablet   meclizine (ANTIVERT) 25 MG tablet   Multiple Vitamin (MULTI-VITAMIN) per tablet   omeprazole 20 MG tablet   sodium chloride (OCEAN) 0.65 % nasal spray   tiotropium (SPIRIVA) 18 MCG capsule   TYLENOL 325 MG OR TABS         Review of Systems   Constitutional: Negative for chills, diaphoresis and fever.   HENT: Negative for congestion and ear pain.    Eyes: Negative for redness and visual disturbance.   Respiratory: Negative for cough, shortness of breath and wheezing.    Cardiovascular: Negative for chest pain.   Gastrointestinal: Negative for abdominal pain, constipation, diarrhea, nausea and vomiting.   Genitourinary: Negative for difficulty urinating and dysuria.   Musculoskeletal: Positive for joint swelling (right knee and pain).   Neurological: Negative for headaches.   Psychiatric/Behavioral: Negative for confusion.   All other systems reviewed and are negative.      Physical Exam   BP: (!) 132/95  Heart Rate: 88  Temp: 98.3  F (36.8  C)  SpO2: 95  %      Physical Exam   Constitutional: She is oriented to person, place, and time. She appears well-developed and well-nourished. No distress.   HENT:   Head: Normocephalic and atraumatic.   Right Ear: External ear normal.   Left Ear: External ear normal.   Eyes: Conjunctivae are normal. Right eye exhibits no discharge. Left eye exhibits no discharge. No scleral icterus.   Neck: Normal range of motion. Neck supple.   Cardiovascular: Regular rhythm and normal heart sounds. Exam reveals no friction rub.   No murmur heard.  Pulmonary/Chest: Effort normal and breath sounds normal. No stridor. No respiratory distress. She has no wheezes. She has no rales. She exhibits no tenderness.   Musculoskeletal:        Right knee: She exhibits swelling (moderate generalized swelling noted). She exhibits normal range of motion, no ecchymosis, no deformity, no laceration, no erythema and normal alignment. Tenderness found. Medial joint line tenderness noted.   Anterior drawer and posterior drawer sign are negative.  Stress varus test has pain stress valgus test is negative.   Neurological: She is alert and oriented to person, place, and time. Coordination normal.   Skin: Skin is warm and dry. No rash noted. She is not diaphoretic. No erythema. No pallor.   Psychiatric: She has a normal mood and affect.   Nursing note and vitals reviewed.      ED Course        Procedures      Results for orders placed or performed during the hospital encounter of 02/18/19 (from the past 24 hour(s))   XR Knee Right 3 Views    Narrative    XR KNEE RT 3 VW 2/18/2019 3:57 PM    HISTORY: Slipped and fell.    COMPARISON: None.    FINDINGS: No fracture or malalignment. Osseous structures appear  normal. No joint effusion.      Impression    IMPRESSION: No acute osseous abnormality.    CARIDAD PAVON MD       Medications - No data to display    Assessments & Plan (with Medical Decision Making)     I have reviewed the nursing notes.    I have reviewed the  findings, diagnosis, plan and need for follow up with the patient.  Dasia Reynolds is a 63 year old female who admits to past medical history of Ménière's disease, COPD, arthritis presenting to urgent care with right-sided knee pain for the past 4 weeks.  Patient originally twisted the knee 4 weeks ago and subsequently fell on the knee 2 weeks later and reports persistent knee pain and swelling since.  Patient reports relieving symptoms of walking on it oral anti-inflammatories and pain medications including Tylenol and ibuprofen and ice.  Patient denies any previous assessment to the knee.  Exam as noted above.  X-ray obtained to rule out fracture or large joint effusion.  No fracture or malalignment noted osseous structures appear normal.  No joint effusion noted.  Will place patient in hinged knee brace.  And consider arthritis with knee contusion versus meniscus injury versus combination of both.  Referral placed to orthopedic for follow-up and further evaluation.  Did review all of this with patient.  Did discuss taking Tylenol thousand milligrams daily with 2 ibuprofen every 6 hours as needed for pain.  Patient discharged in stable condition.     Medication List      There are no discharge medications for this visit.         Final diagnoses:   Chronic pain of right knee   Pain and swelling of right knee       2/18/2019   [facility] EMERGENCY DEPARTMENT     Kerri Salazar, PORTER CNP  02/18/19 8725

## 2019-06-11 ENCOUNTER — MEDICAL CORRESPONDENCE (OUTPATIENT)
Dept: HEALTH INFORMATION MANAGEMENT | Facility: CLINIC | Age: 64
End: 2019-06-11

## 2019-08-02 ENCOUNTER — ANESTHESIA EVENT (OUTPATIENT)
Dept: GASTROENTEROLOGY | Facility: CLINIC | Age: 64
End: 2019-08-02
Payer: COMMERCIAL

## 2019-08-02 ASSESSMENT — COPD QUESTIONNAIRES: COPD: 1

## 2019-08-02 ASSESSMENT — LIFESTYLE VARIABLES: TOBACCO_USE: 1

## 2019-08-02 NOTE — ANESTHESIA PREPROCEDURE EVALUATION
Anesthesia Pre-Procedure Evaluation    Patient: Dasia Reynolds   MRN: [id number] : 1955          Preoperative Diagnosis: screening    Procedure(s):  COLONOSCOPY    No past medical history on file.  Past Surgical History:   Procedure Laterality Date     CHOLECYSTECTOMY         Anesthesia Evaluation     . Pt has had prior anesthetic. Type: General    No history of anesthetic complications          ROS/MED HX    ENT/Pulmonary:     (+)tobacco use, Past use Treatment: Inhaler prn and Inhaler daily,  mild COPD, , . .    Neurologic:     (+)other neuro menier's    Cardiovascular:     (+) ----. : . . . :. . Previous cardiac testing date:results:date: results:ECG reviewed date: results:NSR date: results:          METS/Exercise Tolerance:  >4 METS   Hematologic:  - neg hematologic  ROS       Musculoskeletal:  - neg musculoskeletal ROS       GI/Hepatic:     (+) GERD       Renal/Genitourinary:  - ROS Renal section negative       Endo:  - neg endo ROS       Psychiatric:  - neg psychiatric ROS       Infectious Disease:  - neg infectious disease ROS       Malignancy:      - no malignancy   Other:    - neg other ROS                      Physical Exam  Normal systems: cardiovascular, pulmonary and dental    Airway   Mallampati: II  TM distance: >3 FB  Neck ROM: full    Dental     Cardiovascular       Pulmonary             Lab Results   Component Value Date    WBC 5.8 2012    HGB 14.5 2012    HCT 44.2 2012     2012     2012    POTASSIUM 4.2 2012    CHLORIDE 105 2012    CO2 28 2012    BUN 14 2012    CR 0.72 2012     (H) 2012    MARYCHUY 9.8 2012    ALBUMIN 4.6 2012    PROTTOTAL 7.3 2012    ALT 39 2012    AST 23 2012    ALKPHOS 60 2012    BILITOTAL 1.1 2012    LIPASE 185 2007    PTT 30 09/15/2008    INR 0.97 09/15/2008       Preop Vitals  BP Readings from Last 3 Encounters:   19 (!)  "132/95   10/10/18 118/82   06/06/18 122/84    Pulse Readings from Last 3 Encounters:   10/10/18 71   01/15/18 78   01/14/18 90      Resp Readings from Last 3 Encounters:   10/10/18 16   08/18/15 20   05/13/13 20    SpO2 Readings from Last 3 Encounters:   02/18/19 95%   10/10/18 97%   01/14/18 95%      Temp Readings from Last 1 Encounters:   02/18/19 36.8  C (98.3  F) (Oral)    Ht Readings from Last 1 Encounters:   10/10/18 1.6 m (5' 3\")      Wt Readings from Last 1 Encounters:   10/10/18 66.4 kg (146 lb 6.4 oz)    Estimated body mass index is 25.93 kg/m  as calculated from the following:    Height as of 10/10/18: 1.6 m (5' 3\").    Weight as of 10/10/18: 66.4 kg (146 lb 6.4 oz).       Anesthesia Plan      History & Physical Review  History and physical reviewed and following examination; no interval change.    ASA Status:  2 .    NPO Status:  > 8 hours    Plan for General and MAC with Propofol and Intravenous induction. Maintenance will be TIVA.  Reason for MAC:  Deep or markedly invasive procedure (G8)  PONV prophylaxis:  Ondansetron (or other 5HT-3) and Dexamethasone or Solumedrol       Postoperative Care  Postoperative pain management:  IV analgesics and Oral pain medications.      Consents  Anesthetic plan, risks, benefits and alternatives discussed with:  Patient..                 PORTER Reyes CRNA  "

## 2019-08-16 ENCOUNTER — HOSPITAL ENCOUNTER (OUTPATIENT)
Facility: CLINIC | Age: 64
Discharge: HOME | End: 2019-08-16
Attending: SURGERY | Admitting: SURGERY
Payer: COMMERCIAL

## 2019-08-16 ENCOUNTER — ANESTHESIA (OUTPATIENT)
Dept: GASTROENTEROLOGY | Facility: CLINIC | Age: 64
End: 2019-08-16
Payer: COMMERCIAL

## 2019-08-16 VITALS
DIASTOLIC BLOOD PRESSURE: 91 MMHG | RESPIRATION RATE: 16 BRPM | WEIGHT: 142 LBS | TEMPERATURE: 98 F | OXYGEN SATURATION: 96 % | HEART RATE: 73 BPM | HEIGHT: 64 IN | BODY MASS INDEX: 24.24 KG/M2 | SYSTOLIC BLOOD PRESSURE: 124 MMHG

## 2019-08-16 LAB — COLONOSCOPY: NORMAL

## 2019-08-16 PROCEDURE — 88305 TISSUE EXAM BY PATHOLOGIST: CPT | Mod: 26 | Performed by: SURGERY

## 2019-08-16 PROCEDURE — 37000008 ZZH ANESTHESIA TECHNICAL FEE, 1ST 30 MIN: Performed by: SURGERY

## 2019-08-16 PROCEDURE — 88305 TISSUE EXAM BY PATHOLOGIST: CPT | Performed by: SURGERY

## 2019-08-16 PROCEDURE — 25000125 ZZHC RX 250: Performed by: NURSE ANESTHETIST, CERTIFIED REGISTERED

## 2019-08-16 PROCEDURE — 37000009 ZZH ANESTHESIA TECHNICAL FEE, EACH ADDTL 15 MIN: Performed by: SURGERY

## 2019-08-16 PROCEDURE — 25800030 ZZH RX IP 258 OP 636: Performed by: SURGERY

## 2019-08-16 PROCEDURE — 45380 COLONOSCOPY AND BIOPSY: CPT | Mod: PT | Performed by: SURGERY

## 2019-08-16 PROCEDURE — 25000125 ZZHC RX 250: Performed by: SURGERY

## 2019-08-16 PROCEDURE — 25000128 H RX IP 250 OP 636: Performed by: NURSE ANESTHETIST, CERTIFIED REGISTERED

## 2019-08-16 RX ORDER — ACETAMINOPHEN 325 MG/1
TABLET ORAL
COMMUNITY
Start: 2008-09-16 | End: 2020-12-01

## 2019-08-16 RX ORDER — SODIUM CHLORIDE, SODIUM LACTATE, POTASSIUM CHLORIDE, CALCIUM CHLORIDE 600; 310; 30; 20 MG/100ML; MG/100ML; MG/100ML; MG/100ML
INJECTION, SOLUTION INTRAVENOUS CONTINUOUS
Status: DISCONTINUED | OUTPATIENT
Start: 2019-08-16 | End: 2019-08-16 | Stop reason: HOSPADM

## 2019-08-16 RX ORDER — PROPOFOL 10 MG/ML
INJECTION, EMULSION INTRAVENOUS CONTINUOUS PRN
Status: DISCONTINUED | OUTPATIENT
Start: 2019-08-16 | End: 2019-08-16

## 2019-08-16 RX ORDER — FLUMAZENIL 0.1 MG/ML
0.2 INJECTION, SOLUTION INTRAVENOUS
Status: DISCONTINUED | OUTPATIENT
Start: 2019-08-16 | End: 2019-08-16 | Stop reason: HOSPADM

## 2019-08-16 RX ORDER — NALOXONE HYDROCHLORIDE 0.4 MG/ML
.1-.4 INJECTION, SOLUTION INTRAMUSCULAR; INTRAVENOUS; SUBCUTANEOUS
Status: DISCONTINUED | OUTPATIENT
Start: 2019-08-16 | End: 2019-08-16 | Stop reason: HOSPADM

## 2019-08-16 RX ORDER — TIOTROPIUM BROMIDE 18 UG/1
18 CAPSULE ORAL; RESPIRATORY (INHALATION) DAILY
COMMUNITY
Start: 2019-07-02 | End: 2020-12-01

## 2019-08-16 RX ORDER — LIDOCAINE HYDROCHLORIDE 10 MG/ML
INJECTION, SOLUTION INFILTRATION; PERINEURAL PRN
Status: DISCONTINUED | OUTPATIENT
Start: 2019-08-16 | End: 2019-08-16

## 2019-08-16 RX ORDER — PROPOFOL 10 MG/ML
INJECTION, EMULSION INTRAVENOUS PRN
Status: DISCONTINUED | OUTPATIENT
Start: 2019-08-16 | End: 2019-08-16

## 2019-08-16 RX ORDER — LIDOCAINE 40 MG/G
CREAM TOPICAL
Status: DISCONTINUED | OUTPATIENT
Start: 2019-08-16 | End: 2019-08-16 | Stop reason: HOSPADM

## 2019-08-16 RX ORDER — ONDANSETRON 2 MG/ML
4 INJECTION INTRAMUSCULAR; INTRAVENOUS
Status: DISCONTINUED | OUTPATIENT
Start: 2019-08-16 | End: 2019-08-16 | Stop reason: HOSPADM

## 2019-08-16 RX ADMIN — LIDOCAINE HYDROCHLORIDE 1 ML: 10 INJECTION, SOLUTION EPIDURAL; INFILTRATION; INTRACAUDAL; PERINEURAL at 06:58

## 2019-08-16 RX ADMIN — LIDOCAINE HYDROCHLORIDE 50 MG: 10 INJECTION, SOLUTION INFILTRATION; PERINEURAL at 07:29

## 2019-08-16 RX ADMIN — PROPOFOL 100 MG: 10 INJECTION, EMULSION INTRAVENOUS at 07:29

## 2019-08-16 RX ADMIN — PROPOFOL 200 MCG/KG/MIN: 10 INJECTION, EMULSION INTRAVENOUS at 07:29

## 2019-08-16 RX ADMIN — SODIUM CHLORIDE, POTASSIUM CHLORIDE, SODIUM LACTATE AND CALCIUM CHLORIDE: 600; 310; 30; 20 INJECTION, SOLUTION INTRAVENOUS at 06:58

## 2019-08-16 ASSESSMENT — COPD QUESTIONNAIRES: CAT_SEVERITY: MILD

## 2019-08-16 ASSESSMENT — MIFFLIN-ST. JEOR: SCORE: 1166.17

## 2019-08-16 NOTE — ANESTHESIA POSTPROCEDURE EVALUATION
Patient: Dasia Reynolds    Procedure(s):  COLONOSCOPY    Diagnosis:screening  Diagnosis Additional Information: No value filed.    Anesthesia Type:  No value filed.    Note:  Anesthesia Post Evaluation    Patient location during evaluation: Bedside  Patient participation: Able to fully participate in evaluation  Level of consciousness: sleepy but conscious  Pain management: adequate  Airway patency: patent  Cardiovascular status: stable  Respiratory status: nasal cannula  Hydration status: stable  PONV: none     Anesthetic complications: None          Last vitals:  Vitals:    08/16/19 0631   BP: (!) 140/95   Resp: 16   Temp: 36.7  C (98  F)   SpO2: 98%         Electronically Signed By: PORTER Smart CRNA  August 16, 2019  7:38 AM

## 2019-08-16 NOTE — H&P
"64 year old year old female here for colonoscopy for personal history of polyps.  Last colonoscopy was 6 years ago and negative.  Patient denies any changes in stool caliber or blood in stool.   Denies family history of CRC or polyps.    Patient Active Problem List   Diagnosis     Advanced directives, counseling/discussion     COPD (chronic obstructive pulmonary disease) (H)     Meniere's disease     Pulmonary nodule       Past Medical History:   Diagnosis Date     COPD (chronic obstructive pulmonary disease) (H)      Meniere's disease        Past Surgical History:   Procedure Laterality Date     CHOLECYSTECTOMY  2008       Family History   Problem Relation Age of Onset     Respiratory Father         emphysema     Alzheimer Disease Maternal Grandmother      Cancer Maternal Grandfather         pancreatic cancer     Cancer Paternal Grandfather         lung     Gastrointestinal Disease Sister         diverticulis     Alzheimer Disease Mother        No current outpatient medications on file.       Allergies   Allergen Reactions     Augmentin      Vertigo/vomiting     Fumaric Acid Itching     Morphine Itching     Penicillins      vertigo      Sulfa Drugs      Headache         Pt reports that she quit smoking about 6 years ago. Her smoking use included cigarettes. She has a 20.00 pack-year smoking history. She has never used smokeless tobacco. She reports that she drinks alcohol. She reports that she does not use drugs.    Exam:  BP (!) 140/95   Temp 98  F (36.7  C) (Oral)   Resp 16   Ht 1.613 m (5' 3.5\")   Wt 64.4 kg (142 lb)   LMP 09/11/2001   SpO2 98%   BMI 24.76 kg/m      Awake, Alert OX3  Lungs - CTA bilaterally  CV - RRR, no murmurs, distal pulses intact  Abd - soft, non-distended, non-tender, +BS  Extr - No cyanosis or edema    A/P 64 year old year old female in need of colonoscopy for personal history of polyps. Risks, benefits, alternatives, and complications were discussed including the possibility of " perforation, bleeding, missed lesion and the patient agreed to proceed.    Narciso Singh, DO on 8/16/2019 at 7:19 AM

## 2019-08-16 NOTE — ANESTHESIA CARE TRANSFER NOTE
Patient: Dasia Reynolds    Procedure(s):  COLONOSCOPY    Diagnosis: screening  Diagnosis Additional Information: No value filed.    Anesthesia Type:   No value filed.     Note:  Airway :Nasal Cannula  Patient transferred to:Phase II        Vitals: (Last set prior to Anesthesia Care Transfer)    CRNA VITALS  8/16/2019 0708 - 8/16/2019 0738      8/16/2019             Pulse:  69    Ht Rate:  68    SpO2:  98 %                Electronically Signed By: PORTER Smart CRNA  August 16, 2019  7:38 AM

## 2019-08-20 LAB — COPATH REPORT: NORMAL

## 2019-09-03 PROBLEM — K63.5 COLON POLYPS: Status: ACTIVE | Noted: 2019-09-03

## 2019-10-30 NOTE — LETTER
"    1/24/2018         RE: Dasia Reynolds  [address]  [facility] [address]        Dear Colleague,    Thank you for referring your patient, Dasia Reynolds, to the [facility]. Please see a copy of my visit note below.    Sports Medicine Clinic Visit - Interim History January 24, 2018      PCP: Lakisha Flanagan    Dasia Reynolds is a 62 year old female who is seen in f/u up for Closed nondisplaced fracture of neck of right radius with routine healing, subsequent encounter. Since last visit on 1/15/18, patient reports the right elbow and forearm are feeling better, however, still very sore.  - Now ~ 2 weeks from initial injury    Social History: works at assisted living    Review of Systems  Skin: no bruising, initial swelling  Musculoskeletal: as above  Neurologic: no numbness, paresthesias  Remainder of review of systems is negative including constitutional, CV, pulmonary, GI, except as noted in HPI or medical history.    Patient's current problem list, past medical and surgical history, and family history were reviewed.    Patient Active Problem List   Diagnosis     Advanced directives, counseling/discussion     No past medical history on file.  Past Surgical History:   Procedure Laterality Date     CHOLECYSTECTOMY  2008     Family History   Problem Relation Age of Onset     Respiratory Father      emphysema     Alzheimer Disease Maternal Grandmother      CANCER Maternal Grandfather      pancreatic cancer     CANCER Paternal Grandfather      lung     GASTROINTESTINAL DISEASE Sister      diverticulis     Alzheimer Disease Mother        Objective  /82 (BP Location: Left arm, Patient Position: Sitting, Cuff Size: Adult Regular)  Ht 5' 3\" (1.6 m)  Wt 148 lb (67.1 kg)  LMP 09/11/2001  BMI 26.22 kg/m2    GENERAL APPEARANCE: healthy, alert and no distress   GAIT: NORMAL  SKIN: no suspicious lesions or rashes  HEENT: Sclera clear, anicteric  CV: good peripheral " October 30, 2019       Shanon Gallegos, CNP  [address] [address]  VIA Mail      Patient: Gil Garcia   YOB: 2012   Date of Visit: 10/30/2019       Dear Dr. Gallegos:    I saw your patient, Gil Garcia, for an evaluation. Below are my notes for this visit with her.    If you have questions, please do not hesitate to call me.      Sincerely,        Radha Katz MD        CC: No Recipients  Radha Katz MD  10/30/2019  8:29 AM  Sign when Signing Visit   Pediatric Pulmonary Medicine New Visit     Name: Gil Garcia                        YOB: 2012      Patient ID: [id number]           Age: 6  Yrs 10  Mos  Date of Service:  10/30/2019                   Accompanied by: with Mother      HPI:   Gil is a 6 year old female who presents for evaluation and management of asthma.    Patient was an ex 27 weeker .  She was in the NICU at [facility] and did not go home on oxygen.  Mom thinks she did well until she was about 1 or 1 years old.  At that time she started to developed breathing difficulties including increased work of breathing and wheezing.  It occured often and even without colds.  She does to the ER about 5-6 times a year.  She is hospitalized once a year and no PICU stays.      She is now on Flovent 44 mcg 2 puffs twice a day and montelukast for years.  She uses albuterol as needed , maybe once a week.  She gets steroids about once a year.  She does not use a spacer with Flovent.      When she is well the patient can cough and have exercise intolerance.  She does not have  chronic cough at night but it can occur.  No snoring.  She can cough with crying.  No recurrent abdominal pain.  She can be constipated , no diarrhea.  Cough tends to be dry.  She has allergy symptoms around dogs and animals.      RESPIRATORY SUPPORT:   none    Review of Systems   Constitutional: Negative for activity change, appetite change, fatigue and unexpected weight change.    HENT: Negative for congestion, nosebleeds, postnasal drip, sinus pressure, sinus pain and sneezing.    Eyes: Negative for itching.   Respiratory: Negative for apnea, cough, choking, chest tightness, shortness of breath, wheezing and stridor.    Cardiovascular: Negative for chest pain and palpitations.   Gastrointestinal: Negative for abdominal pain, constipation, diarrhea, nausea and vomiting.   Endocrine: Negative.    Genitourinary: Negative.    Musculoskeletal: Negative.    Skin: Negative.    Allergic/Immunologic: Negative for environmental allergies and food allergies.   Neurological: Negative.    Hematological: Does not bruise/bleed easily.   Psychiatric/Behavioral: Negative.        PAST MEDICAL HISTORY:   History reviewed. No pertinent past medical history.    SURGICAL HISTORY:   Past Surgical History:   Procedure Laterality Date   • Tonsillectomy and adenoidectomy         FAMILY HISTORY:   family history is not on file.    SOCIAL HISTORY:   Social History     Socioeconomic History   • Marital status: Single     Spouse name: Not on file   • Number of children: Not on file   • Years of education: Not on file   • Highest education level: Not on file   Occupational History   • Not on file   Social Needs   • Financial resource strain: Not on file   • Food insecurity:     Worry: Not on file     Inability: Not on file   • Transportation needs:     Medical: Not on file     Non-medical: Not on file   Tobacco Use   • Smoking status: Not on file   Substance and Sexual Activity   • Alcohol use: Not on file   • Drug use: Not on file   • Sexual activity: Not on file   Lifestyle   • Physical activity:     Days per week: Not on file     Minutes per session: Not on file   • Stress: Not on file   Relationships   • Social connections:     Talks on phone: Not on file     Gets together: Not on file     Attends Taoist service: Not on file     Active member of club or organization: Not on file     Attends meetings of clubs or  pulses  RESP: Breathing not labored  NEURO: Normal strength and tone, mentation intact and speech normal  PSYCH:  mentation appears normal and affect normal/bright    Bilateral Elbow/arm exam:  Inspection:     no ecchymosis       no edema or effusion     Tender:     radial head right and mild over medial and lateral epicondyle     Non-Tender:      remainder of the elbow bilaterally and right wrist     ROM:      flexion  120 right       extension  15 right       forearm supination slightly limited right       forearm pronation slightly limited right     Strength:     pain with resisted strength testing right, though 3/5 throughout     Sensation:     intact throughout hand       intact throughout forearm    Radiology  I ordered, visualized and reviewed these images with the patient  RIGHT ELBOW THREE OR MORE VIEWS   1/24/2018 3:53 PM   HISTORY:  Closed nondisplaced fracture of neck of right radius with  routine healing, subsequent encounter.  COMPARISON: 1/14/2018.  IMPRESSION: Fracture at the radial neck again identified, appears  stable in alignment. Smaller posterior fat pad sign on the lateral  view adjacent to the distal humerus.    Assessment:  1. Closed nondisplaced fracture of neck of right radius with routine healing, subsequent encounter      Nondisplaced proximal radial neck fracture.  Reviewed images and discussed diagnosis. Given nondisplaced fracture and stable injury, I recommend weaning sling and continuing gentle ROM.  Follow up in 1 month for repeat evaluation and x-rays.  Discussed that elbow stiffness, particularly in extension, may persist following this injury.  Discussed limiting lifting and weightbearing with this arm for likely 3 months following injury.  Discussed potential for occupational therapy if needed in the future to help with motion and strength.    Plan:  - Today's Plan of Care:  Over the counter medication: Acetaminophen (Tylenol) 1000mg every 6 hours with food (Maximum of  3000mg/day)  Work Restrictions  Wean sling, range of motion as able  Limit lifting and weightbearing on arm    -We also discussed other future treatment options:  Occupational therapy    Follow Up: 1 month with re-xray    Concerning signs and symptoms were reviewed.  The patient expressed understanding of this management plan and all questions were answered at this time.    Shila Craig MD [facility]  Primary Care Sports Medicine  [facility]    Again, thank you for allowing me to participate in the care of your patient.        Sincerely,        Shila Craig MD     organizations: Not on file     Relationship status: Not on file   • Intimate partner violence:     Fear of current or ex partner: Not on file     Emotionally abused: Not on file     Physically abused: Not on file     Forced sexual activity: Not on file   Other Topics Concern   • Not on file   Social History Narrative   • Not on file       ALLERGIES: ALLERGIES:  Soybean allergy    (food or med); Dog dander; and Horse allergy     IMMUNIZATIONS:   There is no immunization history on file for this patient.     Visit Vitals  /56 (BP Location: LUE - Left upper extremity, Patient Position: Sitting, Cuff Size: Small Adult)   Pulse 102   Ht 4' 1.29\" (1.252 m)   Wt 20.3 kg (44 lb 12.1 oz)   SpO2 98%   BMI 12.95 kg/m²     Growth percentiles: 38 %ile (Z= -0.30) based on CDC (Girls, 2-20 Years) Stature-for-age data based on Stature recorded on 10/30/2019. 7 %ile (Z= -1.46) based on CDC (Girls, 2-20 Years) weight-for-age data using vitals from 10/30/2019. Body mass index is 12.95 kg/m².    Physical Exam  Constitutional:       Appearance: She is well-developed.   HENT:      Head: Atraumatic.      Mouth/Throat:      Mouth: Mucous membranes are moist.   Eyes:      Conjunctiva/sclera: Conjunctivae normal.   Neck:      Musculoskeletal: Normal range of motion and neck supple.   Cardiovascular:      Rate and Rhythm: Normal rate and regular rhythm.      Heart sounds: S1 normal and S2 normal. No murmur.   Pulmonary:      Effort: Pulmonary effort is normal. No accessory muscle usage, respiratory distress, nasal flaring or retractions.      Breath sounds: Normal breath sounds and air entry. No stridor, decreased air movement or transmitted upper airway sounds.   Chest:      Chest wall: No deformity.   Abdominal:      General: Bowel sounds are normal. There is no distension.      Palpations: Abdomen is soft.      Tenderness: There is no tenderness.   Musculoskeletal: Normal range of motion.   Skin:     General: Skin is warm and moist.    Neurological:      Mental Status: She is alert.      Motor: No abnormal muscle tone.           ASSESSMENT:  Problem List Items Addressed This Visit        Respiratory    Allergic rhinitis due to animal hair and dander    Moderate persistent asthma without complication - Primary          1-Stop Flovent 44 mcg   2- Start Flovent 110 mcg 2 puffs twice a day with mask/spacer  3- Continue montelukast each night  4- Albuterol neb or as Proair with mask/spacer per the asthma action plan ( start twice a day at the first sign of a cold and then every 4 hours with respiratory symptoms)  5- Return in 3-4 months     Radha Katz MD, MSc  Department of Pediatric Pulmonary Medicine  Director, [facility]  [facility]

## 2019-12-06 ENCOUNTER — APPOINTMENT (OUTPATIENT)
Dept: GENERAL RADIOLOGY | Facility: CLINIC | Age: 64
End: 2019-12-06
Attending: PHYSICIAN ASSISTANT
Payer: COMMERCIAL

## 2019-12-06 ENCOUNTER — HOSPITAL ENCOUNTER (EMERGENCY)
Facility: CLINIC | Age: 64
Discharge: HOME | End: 2019-12-06
Attending: PHYSICIAN ASSISTANT | Admitting: PHYSICIAN ASSISTANT
Payer: COMMERCIAL

## 2019-12-06 VITALS
TEMPERATURE: 98.8 F | BODY MASS INDEX: 26.05 KG/M2 | HEART RATE: 94 BPM | DIASTOLIC BLOOD PRESSURE: 90 MMHG | RESPIRATION RATE: 20 BRPM | HEIGHT: 63 IN | SYSTOLIC BLOOD PRESSURE: 160 MMHG | WEIGHT: 147 LBS | OXYGEN SATURATION: 97 %

## 2019-12-06 DIAGNOSIS — M17.11 OSTEOARTHRITIS OF RIGHT KNEE, UNSPECIFIED OSTEOARTHRITIS TYPE: ICD-10-CM

## 2019-12-06 PROCEDURE — 99214 OFFICE O/P EST MOD 30 MIN: CPT | Mod: Z6 | Performed by: PHYSICIAN ASSISTANT

## 2019-12-06 PROCEDURE — 73560 X-RAY EXAM OF KNEE 1 OR 2: CPT | Mod: RT

## 2019-12-06 PROCEDURE — G0463 HOSPITAL OUTPT CLINIC VISIT: HCPCS | Performed by: PHYSICIAN ASSISTANT

## 2019-12-06 ASSESSMENT — MIFFLIN-ST. JEOR: SCORE: 1180.92

## 2019-12-06 NOTE — ED AVS SNAPSHOT
[facility] Emergency Department  [address] [address]  Phone:  [phone]  Fax:  [phone]                                    Dasia Reynolds   MRN: [id number]    Department:  [facility] Emergency Department   Date of Visit:  12/6/2019           After Visit Summary Signature Page    I have received my discharge instructions, and my questions have been answered. I have discussed any challenges I see with this plan with the nurse or doctor.    ..........................................................................................................................................  Patient/Patient Representative Signature      ..........................................................................................................................................  Patient Representative Print Name and Relationship to Patient    ..................................................               ................................................  Date                                   Time    ..........................................................................................................................................  Reviewed by Signature/Title    ...................................................              ..............................................  Date                                               Time          22EPIC Rev 08/18

## 2019-12-07 NOTE — ED PROVIDER NOTES
"  History     Chief Complaint   Patient presents with     Knee Pain     has been getting injections by PMD, now \"popping and cracking\". increased pain.      HPI  Dasia Reynolds is a 64 year old female who presents the urgent care with concern over persistent right knee pain which ipatient has been evaluated for several times previously.  She had been receiving steroid injections from a family practice provider with good relief until the last month.  She has persistent swelling and now complains of a popping, cracking noise, sensation that leg is going to give out and worsening pain.  She reports that provider who have been performing her steroid injections recommended she have an x-ray at her last visit however due to time constraints it was not done and she is requesting imaging at this time.  She denies any new fevers, chills, myalgias, overlying erythema, rashes or skin changes.  No generalized leg swelling and no distal numbness or paresthesias.,  She has been using ibuprofen regularly with some temporary relief.          Allergies:  Allergies   Allergen Reactions     Augmentin      Vertigo/vomiting     Fumaric Acid Itching     Morphine Itching     Penicillins      vertigo      Sulfa Drugs      Headache         Problem List:    Patient Active Problem List    Diagnosis Date Noted     Colon polyps 09/03/2019     Priority: Medium     Tubular adenoma and hyperplastic polyps       Pulmonary nodule 01/21/2015     Priority: Medium     COPD (chronic obstructive pulmonary disease) (H) 02/27/2013     Priority: Medium     Advanced directives, counseling/discussion 05/04/2012     Priority: Medium     Discussed advance care planning with patient; information given to patient to review. 5/4/2012          Meniere's disease 09/07/2007     Priority: Medium        Past Medical History:    Past Medical History:   Diagnosis Date     COPD (chronic obstructive pulmonary disease) (H)      Meniere's disease        Past Surgical History: " "   Past Surgical History:   Procedure Laterality Date     CHOLECYSTECTOMY  2008     COLONOSCOPY N/A 2019    Procedure: COLONOSCOPY, WITH POLYPECTOMY AND BIOPSY;  Surgeon: Narciso Singh DO;  Location: [facility]       Family History:    Family History   Problem Relation Age of Onset     Respiratory Father         emphysema     Alzheimer Disease Maternal Grandmother      Cancer Maternal Grandfather         pancreatic cancer     Cancer Paternal Grandfather         lung     Gastrointestinal Disease Sister         diverticulis     Alzheimer Disease Mother        Social History:  Marital Status:   [2]  Social History     Tobacco Use     Smoking status: Former Smoker     Packs/day: 0.50     Years: 40.00     Pack years: 20.00     Types: Cigarettes     Last attempt to quit: 2012     Years since quittin.0     Smokeless tobacco: Never Used   Substance Use Topics     Alcohol use: Yes     Comment: very little     Drug use: No        Medications:    acetaminophen (TYLENOL) 325 MG tablet  albuterol (PROVENTIL HFA: VENTOLIN HFA) 108 (90 BASE) MCG/ACT inhaler  LORazepam (ATIVAN) 1 MG tablet  meclizine (ANTIVERT) 25 MG tablet  Multiple Vitamin (MULTI-VITAMIN) per tablet  omeprazole 20 MG tablet  tiotropium (SPIRIVA) 18 MCG inhaled capsule  TYLENOL 325 MG OR TABS      Review of Systems  CONSTITUTIONAL:NEGATIVE for fever, chills, change in weight  INTEGUMENTARY/SKIN: NEGATIVE for worrisome rashes, moles or lesions  RESP:NEGATIVE for significant cough or SOB  MUSCULOSKELETAL: POSITIVE  for right knee pain, swelling  and NEGATIVE for other concerning arthralgias or myalgias   NEURO: NEGATIVE for numbness, paresthesias  Physical Exam   BP: (!) 160/90  Pulse: 94  Temp: 98.8  F (37.1  C)  Resp: 20  Height: 160 cm (5' 3\")  Weight: 66.7 kg (147 lb)  SpO2: 97 %  Physical Exam  Constitutional:       General: She is not in acute distress.     Appearance: Normal appearance.   Cardiovascular:      Pulses:           " Posterior tibial pulses are 2+ on the right side.   Musculoskeletal:      Right knee: She exhibits decreased range of motion (with extension due to pain), swelling and effusion. She exhibits no ecchymosis, no deformity, no laceration, no erythema, no LCL laxity and no MCL laxity. Tenderness found. Medial joint line tenderness noted.      Right ankle: Normal.   Skin:     General: Skin is warm and dry.      Findings: No abrasion, erythema, laceration or rash.   Neurological:      Mental Status: She is alert.      Sensory: No sensory deficit.         ED Course        Procedures               Critical Care time:  none             Results for orders placed or performed during the hospital encounter of 12/06/19   XR Knee Right 1/2 Views    Narrative    XR RIGHT KNEE ONE-TWO VIEWS   12/6/2019 8:21 PM     HISTORY: Ongoing pain.      Impression    IMPRESSION:  1. Mild osteoarthrosis of the medial compartment.  2. Moderate effusion. No evidence of a calcified intra-articular body.  3. No evidence of fracture.    JAYDEN ACOSTA MD       Medications - No data to display    Assessments & Plan (with Medical Decision Making)     I have reviewed the nursing notes.    I have reviewed the findings, diagnosis, plan and need for follow up with the patient.       Discharge Medication List as of 12/6/2019  8:50 PM      START taking these medications    Details   diclofenac (VOLTAREN) 1 % topical gel Place 4 g onto the skin 4 times daily, Disp-100 g, R-0, E-Prescribe             Final diagnoses:   Osteoarthritis of right knee, unspecified osteoarthritis type     64-year-old female presents to the urgent care with concern over 1 year history of right knee pain.  Physical exam findings as described above were significant for right knee effusion, tenderness palpation near the medial joint line, decreased range of motion due to discomfort and swelling.  She had x-ray per he request which showed mild of the medial compartment, moderate  effusion, no evidence of fracture or other acute abnormality.  Symptoms are consistent with osteoarthritis possibility of underlying meniscal injury.  She was discharged home stable with instructions for continued symptomatic treatment with ice.  Prescription for diclofenac gel given.  Follow-up with sports medicine or Ortho for further management of ongoing pain.  Worrisome reasons to return to the ER/UC sooner discussed.    Disclaimer: This note consists of symbols derived from keyboarding, dictation, and/or voice recognition software. As a result, there may be errors in the script that have gone undetected.  Please consider this when interpreting information found in the chart.      12/6/2019   [facility] EMERGENCY DEPARTMENT     Merissa Bryant PA-C  12/07/19 [id number]

## 2019-12-18 ENCOUNTER — ANCILLARY PROCEDURE (OUTPATIENT)
Dept: GENERAL RADIOLOGY | Facility: CLINIC | Age: 64
End: 2019-12-18
Attending: PEDIATRICS
Payer: COMMERCIAL

## 2019-12-18 ENCOUNTER — OFFICE VISIT (OUTPATIENT)
Dept: ORTHOPEDICS | Facility: CLINIC | Age: 64
End: 2019-12-18
Payer: COMMERCIAL

## 2019-12-18 VITALS
WEIGHT: 147 LBS | DIASTOLIC BLOOD PRESSURE: 94 MMHG | SYSTOLIC BLOOD PRESSURE: 146 MMHG | BODY MASS INDEX: 26.05 KG/M2 | HEIGHT: 63 IN

## 2019-12-18 DIAGNOSIS — M17.11 ARTHRITIS OF RIGHT KNEE: Primary | ICD-10-CM

## 2019-12-18 DIAGNOSIS — G89.29 CHRONIC PAIN OF RIGHT KNEE: ICD-10-CM

## 2019-12-18 DIAGNOSIS — M25.561 CHRONIC PAIN OF RIGHT KNEE: ICD-10-CM

## 2019-12-18 PROCEDURE — 99213 OFFICE O/P EST LOW 20 MIN: CPT | Performed by: PEDIATRICS

## 2019-12-18 PROCEDURE — 73562 X-RAY EXAM OF KNEE 3: CPT | Mod: RT

## 2019-12-18 ASSESSMENT — MIFFLIN-ST. JEOR: SCORE: 1180.92

## 2019-12-18 NOTE — RESULT ENCOUNTER NOTE
These results were discussed during office visit.    Shila Craig MD, CAQ  Primary Care Sports Medicine  [facility]

## 2019-12-18 NOTE — PROGRESS NOTES
Sports Medicine Clinic Visit    PCP: Lakisha Flanagan    Dasia Reynolds is a 64 year old female who is seen as an ER referral presenting with right knee pain    Injury: She reports right knee pain for one year. She reports a year ago she slipped on ice and fell on her right knee. She has had knee pain on and off since. She reports her knee pain has been more consistent over the last 2 months. She has had 4 steroid injections in the knee, most recently 2 in the last month on 11/26/19 with Dr Anderson at [facility] which provided no change in her pain. She was seen in the ED on 12/6/19 and prescribed Voltaren Gel which has reduced her pain and swelling. Her pain increases with walking, standing and stairs.    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    Patient's current problem list, past medical and surgical history, and family history were reviewed.    Patient Active Problem List   Diagnosis     Advanced directives, counseling/discussion     COPD (chronic obstructive pulmonary disease) (H)     Meniere's disease     Pulmonary nodule     Colon polyps     Past Medical History:   Diagnosis Date     COPD (chronic obstructive pulmonary disease) (H)   "    Meniere's disease      Past Surgical History:   Procedure Laterality Date     CHOLECYSTECTOMY  2008     COLONOSCOPY N/A 8/16/2019    Procedure: COLONOSCOPY, WITH POLYPECTOMY AND BIOPSY;  Surgeon: Narciso Singh DO;  Location: [facility]     Family History   Problem Relation Age of Onset     Respiratory Father         emphysema     Alzheimer Disease Maternal Grandmother      Cancer Maternal Grandfather         pancreatic cancer     Cancer Paternal Grandfather         lung     Gastrointestinal Disease Sister         diverticulis     Alzheimer Disease Mother          Objective  BP (!) 146/94   Ht 1.6 m (5' 3\")   Wt 66.7 kg (147 lb)   LMP 09/11/2001   BMI 26.04 kg/m      GENERAL APPEARANCE: healthy, alert and no distress   GAIT: NORMAL  SKIN: no suspicious lesions or rashes  HEENT: Sclera clear, anicteric  CV: good peripheral pulses  RESP: Breathing not labored  NEURO: Normal strength and tone, mentation intact and speech normal  PSYCH:  mentation appears normal and affect normal/bright    Bilateral Knee exam  Inspection:      Mild right knee swelling    Patella:      Mobility -       Hypomobile right    Tender:      medial joint line right    Non Tender:      remainder of knee area bilateral    Knee ROM:      Flexion 110 degrees right       Extension 5 degrees right    Strength:      5-/5 with knee extension right    Special Tests:     positive (+) Yvan right with pain       neg (-) anterior drawer right       neg (-) posterior drawer right       neg (-) varus at 0 deg and 30 deg right       neg (-) valgus at 0 deg and 30 deg right    Gait:      normal    Neurovascular:      2+ peripheral pulses bilaterally and brisk capillary refill       sensation grossly intact    Radiology  I ordered, visualized and reviewed these images with the patient  Xr Knee Standing Ap Bilat [place] Bilat Lat Right  Result Date: 12/18/2019  XR KNEE STANDING AP BILAT SUN[place] BILAT LAT RT 12/18/2019 4:12 PM HISTORY: Chronic " pain of right knee; Chronic pain of right knee   IMPRESSION: Right knee medial compartment moderate joint space narrowing. Right knee suprapatellar recess joint effusion. Lateral and patellofemoral compartment joint space widths appear within normal limits. RICH VAN MD    Assessment:  1. Arthritis of right knee      Discussed nature of degenerative arthrosis of the knee. Discussed symptom treatment with over-the-counter medications, ice or heat, topical treatments, and rest if needed. Discussed use of sleeve or wrap for comfort. Discussed benefits of exercise and weight loss to reduce pressure at the knee. Discussed injection therapy. Also briefly discussed future consideration of referral to orthopedic surgery for further evaluation and discussion of arthroplasty.  - Given lack of response to prior corticosteroid injections and pain that continues to interfere with every day life, will refer to US guided Hyla uronic Acid injections.    Plan:  - Today's Plan of Care:  Referral for consideration of US guided Synvisc Injection  Home Exercise Program - motion and strength  Continue with relative rest and activity modification, Ice, Compression, and Elevation.  Can apply ice 10-15 minutes 3-4 times per day as needed.    -We also discussed other future treatment options:  MRI  Trial of additional corticosteroid injection (would wait 3-4 months from prior)  Referral to orthopedic surgery    Follow Up: as needed - will call  Dasia to follow up with Primary Care provider regarding elevated blood pressure.    Concerning signs and symptoms were reviewed.  The patient expressed understanding of this management plan and all questions were answered at this time.    Shila Craig MD CAQ  Primary Care Sports Medicine  [facility]

## 2019-12-18 NOTE — LETTER
12/18/2019         RE: Dasia Reynolds  [address]  [place] [address]        Dear Colleague,    Thank you for referring your patient, Dasia Reynolds, to the [facility]. Please see a copy of my visit note below.    Sports Medicine Clinic Visit    PCP: Lakisha Flanagan    Dasia Reynolds is a 64 year old female who is seen as an ER referral presenting with right knee pain    Injury: She reports right knee pain for one year. She reports a year ago she slipped on ice and fell on her right knee. She has had knee pain on and off since. She reports her knee pain has been more consistent over the last 2 months. She has had 4 steroid injections in the knee, most recently 2 in the last month on 11/26/19 with Dr Anderson at [facility] which provided no change in her pain. She was seen in the ED on 12/6/19 and prescribed Voltaren Gel which has reduced her pain and swelling. Her pain increases with walking, standing and stairs.    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    Patient's current problem list, past medical and surgical history, and family history were reviewed.    Patient Active Problem List  "  Diagnosis     Advanced directives, counseling/discussion     COPD (chronic obstructive pulmonary disease) (H)     Meniere's disease     Pulmonary nodule     Colon polyps     Past Medical History:   Diagnosis Date     COPD (chronic obstructive pulmonary disease) (H)      Meniere's disease      Past Surgical History:   Procedure Laterality Date     CHOLECYSTECTOMY  2008     COLONOSCOPY N/A 8/16/2019    Procedure: COLONOSCOPY, WITH POLYPECTOMY AND BIOPSY;  Surgeon: Narciso Singh DO;  Location: [facility]     Family History   Problem Relation Age of Onset     Respiratory Father         emphysema     Alzheimer Disease Maternal Grandmother      Cancer Maternal Grandfather         pancreatic cancer     Cancer Paternal Grandfather         lung     Gastrointestinal Disease Sister         diverticulis     Alzheimer Disease Mother          Objective  BP (!) 146/94   Ht 1.6 m (5' 3\")   Wt 66.7 kg (147 lb)   LMP 09/11/2001   BMI 26.04 kg/m       GENERAL APPEARANCE: healthy, alert and no distress   GAIT: NORMAL  SKIN: no suspicious lesions or rashes  HEENT: Sclera clear, anicteric  CV: good peripheral pulses  RESP: Breathing not labored  NEURO: Normal strength and tone, mentation intact and speech normal  PSYCH:  mentation appears normal and affect normal/bright    Bilateral Knee exam  Inspection:      Mild right knee swelling    Patella:      Mobility -       Hypomobile right    Tender:      medial joint line right    Non Tender:      remainder of knee area bilateral    Knee ROM:      Flexion 110 degrees right       Extension 5 degrees right    Strength:      5-/5 with knee extension right    Special Tests:     positive (+) Yvan right with pain       neg (-) anterior drawer right       neg (-) posterior drawer right       neg (-) varus at 0 deg and 30 deg right       neg (-) valgus at 0 deg and 30 deg right    Gait:      normal    Neurovascular:      2+ peripheral pulses bilaterally and brisk capillary refill     "   sensation grossly intact    Radiology  I ordered, visualized and reviewed these images with the patient  Xr Knee Standing Ap Bilat [place] Bilat Lat Right  Result Date: 12/18/2019  XR KNEE STANDING AP BILAT SUNRISE BILAT LAT RT 12/18/2019 4:12 PM HISTORY: Chronic pain of right knee; Chronic pain of right knee   IMPRESSION: Right knee medial compartment moderate joint space narrowing. Right knee suprapatellar recess joint effusion. Lateral and patellofemoral compartment joint space widths appear within normal limits. RICH VAN MD    Assessment:  1. Arthritis of right knee      Discussed nature of degenerative arthrosis of the knee. Discussed symptom treatment with over-the-counter medications, ice or heat, topical treatments, and rest if needed. Discussed use of sleeve or wrap for comfort. Discussed benefits of exercise and weight loss to reduce pressure at the knee. Discussed injection therapy. Also briefly discussed future consideration of referral to orthopedic surgery for further evaluation and discussion of arthroplasty.  - Given lack of response to prior corticosteroid injections and pain that continues to interfere with every day life, will refer to US guided Hyla uronic Acid injections.    Plan:  - Today's Plan of Care:  Referral for consideration of US guided Synvisc Injection  Home Exercise Program - motion and strength  Continue with relative rest and activity modification, Ice, Compression, and Elevation.  Can apply ice 10-15 minutes 3-4 times per day as needed.    -We also discussed other future treatment options:  MRI  Trial of additional corticosteroid injection (would wait 3-4 months from prior)  Referral to orthopedic surgery    Follow Up: as needed - will call  Dasia to follow up with Primary Care provider regarding elevated blood pressure.    Concerning signs and symptoms were reviewed.  The patient expressed understanding of this management plan and all questions were answered at this  time.    Shila Craig MD CA  Primary Care Sports Medicine  [facility]    Again, thank you for allowing me to participate in the care of your patient.        Sincerely,        Shila Craig MD

## 2019-12-18 NOTE — PATIENT INSTRUCTIONS
Plan:  - Today's Plan of Care:  Referral for consideration of US guided Synvisc Injection  Home Exercise Program - motion and strength  Continue with relative rest and activity modification, Ice, Compression, and Elevation.  Can apply ice 10-15 minutes 3-4 times per day as needed.    -We also discussed other future treatment options:  MRI  Trial of additional corticosteroid injection (would wait 3-4 months from prior)  Referral to orthopedic surgery    Follow Up: as needed - will call    If you have any further questions for your physician or physician s care team you can call [phone] and use option 3 to leave a voice message. Calls received during business hours will be returned same day.      Dasia to follow up with Primary Care provider regarding elevated blood pressure.

## 2020-01-06 ENCOUNTER — TELEPHONE (OUTPATIENT)
Dept: ORTHOPEDICS | Facility: CLINIC | Age: 65
End: 2020-01-06

## 2020-01-06 DIAGNOSIS — M17.11 ARTHRITIS OF RIGHT KNEE: Primary | ICD-10-CM

## 2020-01-06 NOTE — TELEPHONE ENCOUNTER
Reason for Call:  Other knee    Detailed comments: Pt was given a topical medication in Urgent Care that helped with pain, she needs some more until you see here Friday( Dr Smith), would like a refill, please call in to [facility]    Phone Number Patient can be reached at: Home number on file [phone] (home)    Best Time: today    Can we leave a detailed message on this number? YES    Call taken on 1/6/2020 at 3:49 PM by Mary Lemus

## 2020-01-07 NOTE — TELEPHONE ENCOUNTER
LVM with instruction to  the refilled medication. Left phone number to call back with questions.  Jt Triana, ATC

## 2020-01-10 ENCOUNTER — OFFICE VISIT (OUTPATIENT)
Dept: ORTHOPEDICS | Facility: CLINIC | Age: 65
End: 2020-01-10
Payer: COMMERCIAL

## 2020-01-10 VITALS — BODY MASS INDEX: 26.04 KG/M2 | HEIGHT: 63 IN

## 2020-01-10 DIAGNOSIS — M17.11 PRIMARY OSTEOARTHRITIS OF RIGHT KNEE: Primary | ICD-10-CM

## 2020-01-10 PROCEDURE — 20611 DRAIN/INJ JOINT/BURSA W/US: CPT | Mod: RT | Performed by: FAMILY MEDICINE

## 2020-01-10 NOTE — PATIENT INSTRUCTIONS
[facility] Injection Discharge Instructions    Procedure: Right knee Synvisc Injection      You may shower, however avoid swimming, tub baths or hot tubs for 24 hours following your procedure    You may have a mild to moderate increase in pain for several days following the injection.    It may take up to 14 days for the steroid medication to start working although you may feel the effect as early as a few days after the procedure.    You may use ice packs for 10-15 minutes, 3 to 4 times a day at the injection site for comfort    You may use anti-inflammatory medications (such as Ibuprofen or Aleve or Advil) or Tylenol for pain control if necessary    If you were fasting, you may resume your normal diet and medications after the procedure    If you have diabetes, check your blood sugar more frequently than usual as your blood sugar may be higher than normal for 10-14 days following a steroid injection. Contact your doctor who manages your diabetes if your blood sugar is higher than usual      If you experience any of the following, call [facility] @ [phone] or [phone]  -Fever over 100 degree F  -Swelling, bleeding, redness, drainage, warmth at the injection site  - New or worsening pain

## 2020-01-10 NOTE — LETTER
1/10/2020         RE: Dasia Reynolds  [address]  [place] [address]        Dear Colleague,    Thank you for referring your patient, Dasia Reynolds, to the [facility]. Please see a copy of my visit note below.    Large Joint Injection/Arthocentesis: R knee joint  Date/Time: 1/10/2020 3:15 PM  Performed by: Jareth Smith MD  Authorized by: Jareth Smith MD     Indications:  Pain and osteoarthritis  Needle Size:  21 G  Guidance: ultrasound    Approach:  Superolateral  Location:  Knee      Medications:  48 mg hylan 48 MG/6ML  Outcome:  Tolerated well, no immediate complications  Procedure discussed: discussed risks, benefits, and alternatives    Consent Given by:  Patient  Timeout: timeout called immediately prior to procedure    Prep: patient was prepped and draped in usual sterile fashion     Patient tolerated right knee Synvisc injection today.  Aftercare instructions given to patient.  Plan to follow-up as previously discussed with referring provider.      Jareth Smith MD [facility]              Again, thank you for allowing me to participate in the care of your patient.        Sincerely,        Jareth Smith MD

## 2020-01-10 NOTE — PROGRESS NOTES
Large Joint Injection/Arthocentesis: R knee joint  Date/Time: 1/10/2020 3:15 PM  Performed by: Jareth Smith MD  Authorized by: Jareth Smith MD     Indications:  Pain and osteoarthritis  Needle Size:  21 G  Guidance: ultrasound    Approach:  Superolateral  Location:  Knee      Medications:  48 mg hylan 48 MG/6ML  Outcome:  Tolerated well, no immediate complications  Procedure discussed: discussed risks, benefits, and alternatives    Consent Given by:  Patient  Timeout: timeout called immediately prior to procedure    Prep: patient was prepped and draped in usual sterile fashion     Patient tolerated right knee Synvisc injection today.  Aftercare instructions given to patient.  Plan to follow-up as previously discussed with referring provider.      Jareth Smith MD [facility]

## 2020-02-20 DIAGNOSIS — M17.11 ARTHRITIS OF RIGHT KNEE: ICD-10-CM

## 2020-02-20 NOTE — TELEPHONE ENCOUNTER
Medication Request for: Diclofenac Sodium 1% Gel            Patient currently taking: as needed  Patient is also taking OTC: Tylenol as needed    Problems/ Concerns of Patient: no side effects reported  Prescription last written on 1/6/2020 by Dr Craig  Sig: Place 4 g onto the skin 4 times daily - Transdermal   Last Fill Quantity: 100 with # refills: 0     Last Office Visit by provider: 12/18/19  Future Office Visit:   n/a  Patient desires to have faxed or E-prescribe to pharmacy if available  Pharmacy selected in [place].    Phone number patient can be reached at: Home number on file [phone] (home)    Can we leave a detailed message on this number? Not Applicable    Medication requests may take up to 3 business days for a response  Has patient been notified of this No    Gigi Mancilla ATC

## 2020-02-24 ENCOUNTER — TELEPHONE (OUTPATIENT)
Dept: ORTHOPEDICS | Facility: CLINIC | Age: 65
End: 2020-02-24

## 2020-02-24 NOTE — TELEPHONE ENCOUNTER
"Would probably recommend clinic visit to discuss what patient means by \"inside the knee pain\" v. Other pain, this is confusing to me.  - MRI is reasonable prior to this visit if desired let me know.  - Formal physical therapy is an option for other muscular pain/weakness  - Would not recommend repeat corticosteroid injection, patient has had 2 in th last 3 months.  - Depending on symptoms, could trial other bracing options as well.    Shila Craig MD  "

## 2020-02-24 NOTE — TELEPHONE ENCOUNTER
Reason for Call:  Other leg pain    Detailed comments: Pt states her shin and under knee cap is very painful, injection only helped knee, please callo    Phone Number Patient can be reached at: Home number on file [phone] (home)    Best Time: today    Can we leave a detailed message on this number? YES    Call taken on 2/24/2020 at 3:16 PM by Mary Lemus

## 2020-02-24 NOTE — TELEPHONE ENCOUNTER
"Patient returned phone call to clinic.    Called and spoke to her. She stated there is no new injury or pain. The injection helped with the pain \"inside\" the knee but not the other places. She has been doing the HEP and other recommendations.     Discussed briefly the possible future treatments and was interested in the MRI or other suggestions. She said no to seeing a surgeon, she could not be off work.    Shara Ordonez ATC    "

## 2020-02-25 NOTE — TELEPHONE ENCOUNTER
Patient scheduled a follow up visit with Dr Craig on 3/4/20 to review her knee pain and discuss further treatment options.  Jt Triana ATC

## 2020-03-04 ENCOUNTER — OFFICE VISIT (OUTPATIENT)
Dept: ORTHOPEDICS | Facility: CLINIC | Age: 65
End: 2020-03-04
Payer: COMMERCIAL

## 2020-03-04 VITALS
WEIGHT: 148 LBS | SYSTOLIC BLOOD PRESSURE: 130 MMHG | DIASTOLIC BLOOD PRESSURE: 81 MMHG | HEIGHT: 63 IN | BODY MASS INDEX: 26.22 KG/M2

## 2020-03-04 DIAGNOSIS — M17.11 ARTHRITIS OF RIGHT KNEE: Primary | ICD-10-CM

## 2020-03-04 DIAGNOSIS — M17.11 PRIMARY OSTEOARTHRITIS OF RIGHT KNEE: ICD-10-CM

## 2020-03-04 PROCEDURE — 99213 OFFICE O/P EST LOW 20 MIN: CPT | Performed by: PEDIATRICS

## 2020-03-04 ASSESSMENT — MIFFLIN-ST. JEOR: SCORE: 1185.45

## 2020-03-04 NOTE — PATIENT INSTRUCTIONS
Plan:  - Today's Plan of Care:  Continue Home Exercise Program  When painful - continue with relative rest and activity modification, Ice, Compression, and Elevation.  Can apply ice 10-15 minutes 3-4 times per day as needed.    -We also discussed other future treatment options:  Referral to Physical therapy  MRI of right knee    Follow Up: as needed    If you have any further questions for your physician or physician s care team you can call [phone] and use option 3 to leave a voice message. Calls received during business hours will be returned same day.

## 2020-03-04 NOTE — LETTER
3/4/2020         RE: Dasia Reynolds  [address]  [facility] [address]        Dear Colleague,    Thank you for referring your patient, Dasia Reynolds, to the [facility]. Please see a copy of my visit note below.    Sports Medicine Clinic Visit - Interim History March 4, 2020    PCP: Lakisha Flanagan    Dasia Reynolds is a 64 year old female who is seen in f/u up for    Arthritis of right knee  Primary osteoarthritis of right knee. Since last visit on 12/18/19, patient has completed a right knee Synvisc injection with Dr Smith on 1/10/20. She reports the injection reduced her pain and swelling. She is currently having pain in the medial distal knee, however, improving. She has continued her Home Exercise Program.    Initial History 12/18/2019:  Injury: She reports right knee pain for one year. She reports a year ago she slipped on ice and fell on her right knee. She has had knee pain on and off since. She reports her knee pain has been more consistent over the last 2 months. She has had 4 steroid injections in the knee, most recently 2 in the last month on 11/26/19 with Dr Anderson at [facility] which provided no change in her pain. She was seen in the ED on 12/6/19 and prescribed Voltaren Gel which has reduced her pain and swelling. Her pain increases with walking, standing and stairs.     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    Patient's current problem  "list, past medical and surgical history, and family history were reviewed.    Patient Active Problem List   Diagnosis     Advanced directives, counseling/discussion     COPD (chronic obstructive pulmonary disease) (H)     Meniere's disease     Pulmonary nodule     Colon polyps     Past Medical History:   Diagnosis Date     COPD (chronic obstructive pulmonary disease) (H)      Meniere's disease      Past Surgical History:   Procedure Laterality Date     CHOLECYSTECTOMY  2008     COLONOSCOPY N/A 8/16/2019    Procedure: COLONOSCOPY, WITH POLYPECTOMY AND BIOPSY;  Surgeon: Narciso Singh DO;  Location: [facility]     Family History   Problem Relation Age of Onset     Respiratory Father         emphysema     Alzheimer Disease Maternal Grandmother      Cancer Maternal Grandfather         pancreatic cancer     Cancer Paternal Grandfather         lung     Gastrointestinal Disease Sister         diverticulis     Alzheimer Disease Mother        Objective  /81   Ht 1.6 m (5' 3\")   Wt 67.1 kg (148 lb)   LMP 09/11/2001   BMI 26.22 kg/m       GENERAL APPEARANCE: healthy, alert and no distress   GAIT: NORMAL  SKIN: no suspicious lesions or rashes  HEENT: Sclera clear, anicteric  CV: good peripheral pulses  RESP: Breathing not labored  NEURO: Normal strength and tone, mentation intact and speech normal  PSYCH:  mentation appears normal and affect normal/bright     Bilateral Knee exam  Inspection:      Mild right knee swelling     Patella:      Mobility -       Hypomobile right     Tender:      medial joint line right (mild)     Non Tender:      remainder of knee area bilateral     Knee ROM:      Flexion 120 degrees right       Extension  0 degrees right     Strength:      5-/5 with knee extension right     Special Tests:     neg (-) Yvan right with pain       neg (-) anterior drawer right       neg (-) posterior drawer right       neg (-) varus at 0 deg and 30 deg right       neg (-) valgus at 0 deg and 30 deg " right     Gait:      normal     Neurovascular:      2+ peripheral pulses bilaterally and brisk capillary refill       sensation grossly intact    Radiology  I visualized and reviewed these images with the patient  XR KNEE STANDING AP BILAT SUNRISE BILAT LAT RT 12/18/2019 4:12 PM   HISTORY: Chronic pain of right knee; Chronic pain of right knee                                                             IMPRESSION: Right knee medial compartment moderate joint space  narrowing. Right knee suprapatellar recess joint effusion. Lateral and  patellofemoral compartment joint space widths appear within normal  limits.    Assessment:  1. Arthritis of right knee    2. Primary osteoarthritis of right knee      Chronic right knee pain, most likely arthritis.  Improving today, discussed monitoring v. Consideration of other work up and treatment (MRI or physical therapy) pending clinical course.    Plan:  - Today's Plan of Care:  Continue Home Exercise Program  When painful - continue with relative rest and activity modification, Ice, Compression, and Elevation.  Can apply ice 10-15 minutes 3-4 times per day as needed.    -We also discussed other future treatment options:  Referral to Physical therapy  MRI of right knee    Follow Up: as needed    Concerning signs and symptoms were reviewed.  The patient expressed understanding of this management plan and all questions were answered at this time.    Shila Craig MD [facility]  Primary Care Sports Medicine  [facility]    Again, thank you for allowing me to participate in the care of your patient.        Sincerely,        Shila Craig MD

## 2020-03-04 NOTE — PROGRESS NOTES
Sports Medicine Clinic Visit - Interim History March 4, 2020    PCP: Lakisha Flanagan    Dasia Reynolds is a 64 year old female who is seen in f/u up for    Arthritis of right knee  Primary osteoarthritis of right knee. Since last visit on 12/18/19, patient has completed a right knee Synvisc injection with Dr Smith on 1/10/20. She reports the injection reduced her pain and swelling. She is currently having pain in the medial distal knee, however, improving. She has continued her Home Exercise Program.    Initial History 12/18/2019:  Injury: She reports right knee pain for one year. She reports a year ago she slipped on ice and fell on her right knee. She has had knee pain on and off since. She reports her knee pain has been more consistent over the last 2 months. She has had 4 steroid injections in the knee, most recently 2 in the last month on 11/26/19 with Dr Anderson at [facility] which provided no change in her pain. She was seen in the ED on 12/6/19 and prescribed Voltaren Gel which has reduced her pain and swelling. Her pain increases with walking, standing and stairs.     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    Patient's current problem list, past medical and surgical history, and family history were reviewed.    Patient Active Problem List   Diagnosis     Advanced directives, counseling/discussion     COPD (chronic obstructive pulmonary disease) (H)     Meniere's disease     Pulmonary nodule  "    Colon polyps     Past Medical History:   Diagnosis Date     COPD (chronic obstructive pulmonary disease) (H)      Meniere's disease      Past Surgical History:   Procedure Laterality Date     CHOLECYSTECTOMY  2008     COLONOSCOPY N/A 8/16/2019    Procedure: COLONOSCOPY, WITH POLYPECTOMY AND BIOPSY;  Surgeon: Narciso Singh DO;  Location: [facility]     Family History   Problem Relation Age of Onset     Respiratory Father         emphysema     Alzheimer Disease Maternal Grandmother      Cancer Maternal Grandfather         pancreatic cancer     Cancer Paternal Grandfather         lung     Gastrointestinal Disease Sister         diverticulis     Alzheimer Disease Mother        Objective  /81   Ht 1.6 m (5' 3\")   Wt 67.1 kg (148 lb)   LMP 09/11/2001   BMI 26.22 kg/m      GENERAL APPEARANCE: healthy, alert and no distress   GAIT: NORMAL  SKIN: no suspicious lesions or rashes  HEENT: Sclera clear, anicteric  CV: good peripheral pulses  RESP: Breathing not labored  NEURO: Normal strength and tone, mentation intact and speech normal  PSYCH:  mentation appears normal and affect normal/bright     Bilateral Knee exam  Inspection:      Mild right knee swelling     Patella:      Mobility -       Hypomobile right     Tender:      medial joint line right (mild)     Non Tender:      remainder of knee area bilateral     Knee ROM:      Flexion 120 degrees right       Extension 0 degrees right     Strength:      5-/5 with knee extension right     Special Tests:     neg (-) Yvan right with pain       neg (-) anterior drawer right       neg (-) posterior drawer right       neg (-) varus at 0 deg and 30 deg right       neg (-) valgus at 0 deg and 30 deg right     Gait:      normal     Neurovascular:      2+ peripheral pulses bilaterally and brisk capillary refill       sensation grossly intact    Radiology  I visualized and reviewed these images with the patient  XR KNEE STANDING AP BILAT SUNRISE BILAT LAT RT " 12/18/2019 4:12 PM   HISTORY: Chronic pain of right knee; Chronic pain of right knee                                                             IMPRESSION: Right knee medial compartment moderate joint space  narrowing. Right knee suprapatellar recess joint effusion. Lateral and  patellofemoral compartment joint space widths appear within normal  limits.    Assessment:  1. Arthritis of right knee    2. Primary osteoarthritis of right knee      Chronic right knee pain, most likely arthritis.  Improving today, discussed monitoring v. Consideration of other work up and treatment (MRI or physical therapy) pending clinical course.    Plan:  - Today's Plan of Care:  Continue Home Exercise Program  When painful - continue with relative rest and activity modification, Ice, Compression, and Elevation.  Can apply ice 10-15 minutes 3-4 times per day as needed.    -We also discussed other future treatment options:  Referral to Physical therapy  MRI of right knee    Follow Up: as needed    Concerning signs and symptoms were reviewed.  The patient expressed understanding of this management plan and all questions were answered at this time.    Shila Craig MD [place]  Primary Care Sports Medicine  [facility]

## 2020-03-15 ENCOUNTER — HEALTH MAINTENANCE LETTER (OUTPATIENT)
Age: 65
End: 2020-03-15

## 2020-04-16 ENCOUNTER — HOSPITAL ENCOUNTER (OUTPATIENT)
Dept: CT IMAGING | Facility: CLINIC | Age: 65
Discharge: HOME | End: 2020-04-16
Attending: FAMILY MEDICINE | Admitting: FAMILY MEDICINE
Payer: COMMERCIAL

## 2020-04-16 DIAGNOSIS — Z87.891 SMOKING HX: ICD-10-CM

## 2020-04-16 DIAGNOSIS — Z87.891 ENCOUNTER FOR SCREENING FOR MALIGNANT NEOPLASM OF LUNG IN FORMER SMOKER WHO QUIT IN PAST 15 YEARS WITH 30 PACK YEAR HISTORY OR GREATER: ICD-10-CM

## 2020-04-16 DIAGNOSIS — Z12.2 ENCOUNTER FOR SCREENING FOR MALIGNANT NEOPLASM OF LUNG IN FORMER SMOKER WHO QUIT IN PAST 15 YEARS WITH 30 PACK YEAR HISTORY OR GREATER: ICD-10-CM

## 2020-04-16 DIAGNOSIS — R91.1 LUNG NODULE: ICD-10-CM

## 2020-04-16 PROCEDURE — G0297 LDCT FOR LUNG CA SCREEN: HCPCS

## 2020-04-20 ENCOUNTER — HOSPITAL ENCOUNTER (EMERGENCY)
Facility: CLINIC | Age: 65
Discharge: HOME | End: 2020-04-20
Attending: EMERGENCY MEDICINE | Admitting: EMERGENCY MEDICINE
Payer: COMMERCIAL

## 2020-04-20 VITALS
DIASTOLIC BLOOD PRESSURE: 86 MMHG | SYSTOLIC BLOOD PRESSURE: 165 MMHG | TEMPERATURE: 98.5 F | RESPIRATION RATE: 16 BRPM | OXYGEN SATURATION: 96 %

## 2020-04-20 DIAGNOSIS — J01.91 ACUTE RECURRENT SINUSITIS, UNSPECIFIED LOCATION: ICD-10-CM

## 2020-04-20 PROCEDURE — 99284 EMERGENCY DEPT VISIT MOD MDM: CPT | Mod: Z6 | Performed by: EMERGENCY MEDICINE

## 2020-04-20 PROCEDURE — 99282 EMERGENCY DEPT VISIT SF MDM: CPT | Performed by: EMERGENCY MEDICINE

## 2020-04-20 RX ORDER — DOXYCYCLINE 100 MG/1
100 CAPSULE ORAL 2 TIMES DAILY
Qty: 20 CAPSULE | Refills: 0 | Status: SHIPPED | OUTPATIENT
Start: 2020-04-20 | End: 2020-04-30

## 2020-04-20 NOTE — ED NOTES
Pt presents to the ER with c/o sinusitis.  She has a h/o recurring sinus infections.  Pt also reports a h/o Meniere's disease.  She has been having issues with dizziness x 3 days- which is consistent with her Meniere's disease.  She states the dizziness is improving.  She is here because she's concerned about having a sinus infx.

## 2020-04-20 NOTE — ED AVS SNAPSHOT
[facility] Emergency Department  [address] [address]  Phone:  [phone]  Fax:  [phone]                                    Dasia Reynolds   MRN: [id number]    Department:  [facility] Emergency Department   Date of Visit:  4/20/2020           After Visit Summary Signature Page    I have received my discharge instructions, and my questions have been answered. I have discussed any challenges I see with this plan with the nurse or doctor.    ..........................................................................................................................................  Patient/Patient Representative Signature      ..........................................................................................................................................  Patient Representative Print Name and Relationship to Patient    ..................................................               ................................................  Date                                   Time    ..........................................................................................................................................  Reviewed by Signature/Title    ...................................................              ..............................................  Date                                               Time          22EPIC Rev 08/18

## 2020-04-20 NOTE — ED PROVIDER NOTES
History     Chief Complaint   Patient presents with     Dizziness     Starting Saturday night- improving.  Has ear pain/pressure.  Sinus pain     HPI  Dasia Reynolds is a 64 year old female who presents with generalized frontal headache ongoing for the past couple days.  History of sinusitis.  Also history of environmental allergies.  Describes nasal discharge minimally bloody.  Denies associated fever cough or sore throat.  No ill contacts.  Denies focal neurologic change.  No nausea or vomiting.    Allergies:  Allergies   Allergen Reactions     Augmentin      Vertigo/vomiting     Fumaric Acid Itching     Morphine Itching     Penicillins      vertigo      Sulfa Drugs      Headache         Problem List:           Past Medical History:    Past Medical History:   Diagnosis Date     COPD (chronic obstructive pulmonary disease) (H)      Meniere's disease        Past Surgical History:    Past Surgical History:   Procedure Laterality Date     CHOLECYSTECTOMY       COLONOSCOPY N/A 2019    Procedure: COLONOSCOPY, WITH POLYPECTOMY AND BIOPSY;  Surgeon: Narciso Singh DO;  Location: [facility]       Family History:    Family History   Problem Relation Age of Onset     Respiratory Father         emphysema     Alzheimer Disease Maternal Grandmother      Cancer Maternal Grandfather         pancreatic cancer     Cancer Paternal Grandfather         lung     Gastrointestinal Disease Sister         diverticulis     Alzheimer Disease Mother        Social History:  Marital Status:   [2]  Social History     Tobacco Use     Smoking status: Former Smoker     Packs/day: 0.50     Years: 40.00     Pack years: 20.00     Types: Cigarettes     Last attempt to quit: 2012     Years since quittin.4     Smokeless tobacco: Never Used   Substance Use Topics     Alcohol use: Yes     Comment: very little     Drug use: No        Medications:    doxycycline hyclate (VIBRAMYCIN) 100 MG capsule  acetaminophen (TYLENOL) 325  MG tablet  albuterol (PROVENTIL HFA: VENTOLIN HFA) 108 (90 BASE) MCG/ACT inhaler  diclofenac 1 % TD topical gel  LORazepam (ATIVAN) 1 MG tablet  meclizine (ANTIVERT) 25 MG tablet  Multiple Vitamin (MULTI-VITAMIN) per tablet  omeprazole 20 MG tablet  tiotropium (SPIRIVA) 18 MCG inhaled capsule  TYLENOL 325 MG OR TABS          Review of Systems  Problem focused review of systems otherwise negative    Physical Exam   BP: (!) 165/86  Heart Rate: 84  Temp: 98.5  F (36.9  C)  Resp: 16  SpO2: 96 %      Physical Exam  Nontoxic-appearing no respiratory distress alert and oriented  Head atraumatic normocephalic  EACs clear TMs unremarkable  Nares unremarkable  Oropharynx moist without lesion  Skin pink warm and dry  ED Course        Procedures               Critical Care time:  none               No results found for this or any previous visit (from the past 24 hour(s)).    Medications - No data to display    Assessments & Plan (with Medical Decision Making)     I have reviewed the nursing notes.    I have reviewed the findings, diagnosis, plan and need for follow up with the patient.       New Prescriptions    DOXYCYCLINE HYCLATE (VIBRAMYCIN) 100 MG CAPSULE    Take 1 capsule (100 mg) by mouth 2 times daily for 10 days       Final diagnoses:   Acute recurrent sinusitis, unspecified location       4/20/2020   [facility] EMERGENCY DEPARTMENT     Ezequiel Arriaga MD  04/20/20 [id number]

## 2020-04-20 NOTE — DISCHARGE INSTRUCTIONS
Over-the-counter decongestants and antihistamines    Doxycycline as prescribed    Return here for worsening symptoms

## 2020-06-05 ENCOUNTER — TELEPHONE (OUTPATIENT)
Dept: ORTHOPEDICS | Facility: CLINIC | Age: 65
End: 2020-06-05

## 2020-06-05 DIAGNOSIS — M17.11 PRIMARY OSTEOARTHRITIS OF RIGHT KNEE: Primary | ICD-10-CM

## 2020-06-05 NOTE — TELEPHONE ENCOUNTER
Refill request for Diclofenac Sodium 1% gel received from [facility] pharmacy.      Patient was last seen 3/4/20 for right knee OA by Dr. Craig.    Christa Osman, ATC

## 2020-12-01 ENCOUNTER — HOSPITAL ENCOUNTER (EMERGENCY)
Facility: CLINIC | Age: 65
Discharge: HOME | End: 2020-12-01
Attending: PHYSICIAN ASSISTANT | Admitting: PHYSICIAN ASSISTANT
Payer: COMMERCIAL

## 2020-12-01 ENCOUNTER — APPOINTMENT (OUTPATIENT)
Dept: GENERAL RADIOLOGY | Facility: CLINIC | Age: 65
End: 2020-12-01
Attending: PHYSICIAN ASSISTANT
Payer: COMMERCIAL

## 2020-12-01 ENCOUNTER — APPOINTMENT (OUTPATIENT)
Dept: CT IMAGING | Facility: CLINIC | Age: 65
End: 2020-12-01
Attending: PHYSICIAN ASSISTANT
Payer: COMMERCIAL

## 2020-12-01 VITALS
WEIGHT: 150 LBS | BODY MASS INDEX: 25.61 KG/M2 | HEIGHT: 64 IN | RESPIRATION RATE: 18 BRPM | HEART RATE: 76 BPM | OXYGEN SATURATION: 97 % | TEMPERATURE: 98.2 F | DIASTOLIC BLOOD PRESSURE: 107 MMHG | SYSTOLIC BLOOD PRESSURE: 158 MMHG

## 2020-12-01 DIAGNOSIS — J44.1 COPD EXACERBATION (H): ICD-10-CM

## 2020-12-01 DIAGNOSIS — R06.02 SHORTNESS OF BREATH: ICD-10-CM

## 2020-12-01 DIAGNOSIS — Z20.822 ENCOUNTER FOR LABORATORY TESTING FOR COVID-19 VIRUS: ICD-10-CM

## 2020-12-01 LAB
ALBUMIN SERPL-MCNC: 4.3 G/DL (ref 3.4–5)
ALP SERPL-CCNC: 75 U/L (ref 40–150)
ALT SERPL W P-5'-P-CCNC: 29 U/L (ref 0–50)
ANION GAP SERPL CALCULATED.3IONS-SCNC: 4 MMOL/L (ref 3–14)
AST SERPL W P-5'-P-CCNC: 21 U/L (ref 0–45)
BASOPHILS # BLD AUTO: 0.1 10E9/L (ref 0–0.2)
BASOPHILS NFR BLD AUTO: 0.8 %
BILIRUB SERPL-MCNC: 1 MG/DL (ref 0.2–1.3)
BUN SERPL-MCNC: 12 MG/DL (ref 7–30)
CALCIUM SERPL-MCNC: 8.9 MG/DL (ref 8.5–10.1)
CHLORIDE SERPL-SCNC: 107 MMOL/L (ref 94–109)
CO2 SERPL-SCNC: 29 MMOL/L (ref 20–32)
CREAT SERPL-MCNC: 0.73 MG/DL (ref 0.52–1.04)
DIFFERENTIAL METHOD BLD: NORMAL
EOSINOPHIL # BLD AUTO: 0.2 10E9/L (ref 0–0.7)
EOSINOPHIL NFR BLD AUTO: 2.6 %
ERYTHROCYTE [DISTWIDTH] IN BLOOD BY AUTOMATED COUNT: 12.6 % (ref 10–15)
GFR SERPL CREATININE-BSD FRML MDRD: 86 ML/MIN/{1.73_M2}
GLUCOSE SERPL-MCNC: 88 MG/DL (ref 70–99)
HCT VFR BLD AUTO: 43.5 % (ref 35–47)
HGB BLD-MCNC: 14.1 G/DL (ref 11.7–15.7)
IMM GRANULOCYTES # BLD: 0 10E9/L (ref 0–0.4)
IMM GRANULOCYTES NFR BLD: 0.3 %
LYMPHOCYTES # BLD AUTO: 2.7 10E9/L (ref 0.8–5.3)
LYMPHOCYTES NFR BLD AUTO: 43.9 %
MCH RBC QN AUTO: 30.3 PG (ref 26.5–33)
MCHC RBC AUTO-ENTMCNC: 32.4 G/DL (ref 31.5–36.5)
MCV RBC AUTO: 94 FL (ref 78–100)
MONOCYTES # BLD AUTO: 0.4 10E9/L (ref 0–1.3)
MONOCYTES NFR BLD AUTO: 6.9 %
NEUTROPHILS # BLD AUTO: 2.8 10E9/L (ref 1.6–8.3)
NEUTROPHILS NFR BLD AUTO: 45.5 %
NRBC # BLD AUTO: 0 10*3/UL
NRBC BLD AUTO-RTO: 0 /100
NT-PROBNP SERPL-MCNC: 52 PG/ML (ref 0–900)
PLATELET # BLD AUTO: 309 10E9/L (ref 150–450)
POTASSIUM SERPL-SCNC: 4.3 MMOL/L (ref 3.4–5.3)
PROT SERPL-MCNC: 7.3 G/DL (ref 6.8–8.8)
RBC # BLD AUTO: 4.65 10E12/L (ref 3.8–5.2)
SODIUM SERPL-SCNC: 140 MMOL/L (ref 133–144)
TROPONIN I SERPL-MCNC: <0.015 UG/L (ref 0–0.04)
WBC # BLD AUTO: 6.1 10E9/L (ref 4–11)

## 2020-12-01 PROCEDURE — C9803 HOPD COVID-19 SPEC COLLECT: HCPCS | Performed by: PHYSICIAN ASSISTANT

## 2020-12-01 PROCEDURE — 80053 COMPREHEN METABOLIC PANEL: CPT | Performed by: PHYSICIAN ASSISTANT

## 2020-12-01 PROCEDURE — U0003 INFECTIOUS AGENT DETECTION BY NUCLEIC ACID (DNA OR RNA); SEVERE ACUTE RESPIRATORY SYNDROME CORONAVIRUS 2 (SARS-COV-2) (CORONAVIRUS DISEASE [COVID-19]), AMPLIFIED PROBE TECHNIQUE, MAKING USE OF HIGH THROUGHPUT TECHNOLOGIES AS DESCRIBED BY CMS-2020-01-R: HCPCS | Performed by: PHYSICIAN ASSISTANT

## 2020-12-01 PROCEDURE — 83880 ASSAY OF NATRIURETIC PEPTIDE: CPT | Performed by: PHYSICIAN ASSISTANT

## 2020-12-01 PROCEDURE — 250N000011 HC RX IP 250 OP 636: Performed by: PHYSICIAN ASSISTANT

## 2020-12-01 PROCEDURE — 84484 ASSAY OF TROPONIN QUANT: CPT | Performed by: PHYSICIAN ASSISTANT

## 2020-12-01 PROCEDURE — 99285 EMERGENCY DEPT VISIT HI MDM: CPT | Mod: 25 | Performed by: PHYSICIAN ASSISTANT

## 2020-12-01 PROCEDURE — 71275 CT ANGIOGRAPHY CHEST: CPT

## 2020-12-01 PROCEDURE — 250N000009 HC RX 250: Performed by: PHYSICIAN ASSISTANT

## 2020-12-01 PROCEDURE — 71045 X-RAY EXAM CHEST 1 VIEW: CPT

## 2020-12-01 PROCEDURE — 85025 COMPLETE CBC W/AUTO DIFF WBC: CPT | Performed by: PHYSICIAN ASSISTANT

## 2020-12-01 PROCEDURE — 93010 ELECTROCARDIOGRAM REPORT: CPT | Performed by: PHYSICIAN ASSISTANT

## 2020-12-01 PROCEDURE — 93005 ELECTROCARDIOGRAM TRACING: CPT | Performed by: PHYSICIAN ASSISTANT

## 2020-12-01 RX ORDER — IBUPROFEN 200 MG
400 TABLET ORAL EVERY 4 HOURS PRN
COMMUNITY

## 2020-12-01 RX ORDER — AZITHROMYCIN 250 MG/1
TABLET, FILM COATED ORAL
Qty: 6 TABLET | Refills: 0 | Status: SHIPPED | OUTPATIENT
Start: 2020-12-01 | End: 2020-12-06

## 2020-12-01 RX ORDER — PREDNISONE 20 MG/1
20 TABLET ORAL 2 TIMES DAILY
Qty: 10 TABLET | Refills: 0 | Status: SHIPPED | OUTPATIENT
Start: 2020-12-01 | End: 2020-12-06

## 2020-12-01 RX ORDER — TIOTROPIUM BROMIDE 18 UG/1
1 CAPSULE ORAL; RESPIRATORY (INHALATION) DAILY
COMMUNITY
Start: 2020-05-26

## 2020-12-01 RX ORDER — IOPAMIDOL 755 MG/ML
69 INJECTION, SOLUTION INTRAVASCULAR ONCE
Status: COMPLETED | OUTPATIENT
Start: 2020-12-01 | End: 2020-12-01

## 2020-12-01 RX ADMIN — IOPAMIDOL 69 ML: 755 INJECTION, SOLUTION INTRAVENOUS at 16:34

## 2020-12-01 RX ADMIN — SODIUM CHLORIDE 96 ML: 9 INJECTION, SOLUTION INTRAVENOUS at 16:34

## 2020-12-01 ASSESSMENT — ENCOUNTER SYMPTOMS
SHORTNESS OF BREATH: 1
MUSCULOSKELETAL NEGATIVE: 1
CHEST TIGHTNESS: 1
UNEXPECTED WEIGHT CHANGE: 1
WHEEZING: 1
CARDIOVASCULAR NEGATIVE: 1
FATIGUE: 1
COUGH: 1
HEADACHES: 1
FEVER: 0
GASTROINTESTINAL NEGATIVE: 1

## 2020-12-01 ASSESSMENT — MIFFLIN-ST. JEOR: SCORE: 1197.46

## 2020-12-01 NOTE — ED NOTES
Pt did not want to use a wheel chair or stretcher to go down to CT. Patient wanted to stand and walk to move around

## 2020-12-01 NOTE — ED PROVIDER NOTES
History     Chief Complaint   Patient presents with     Shortness of Breath     Hx of COPD, feels like a flare of that, taking ventolin and spiriva without much improvement.      HPI  Dasia Reynolds is a 65 year old female with history of COPD who presents with complaints of worsening wheezing, cough, chest tightness, and shortness of breath over the past 3 days.  Patient states her symptoms feel consistent with her past COPD exacerbations.  Patient states she feels like she needs to cough up phlegm but nothing comes up.  Patient has been using her Albuterol inhaler with minimal improvement.  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  Pt does admit that she has noticed increase in shortness of breath with exertion and weight gain over the past several months.  Pt has not had to prop herself up to sleep.  She states she becomes very fatigued and winded with climbing stairs.  Pt denies hx heart disease or heart failure.  She has never had a cardiac echo.  Denies fevers, chills, vomiting, diarrhea, abdominal pain, or chest pain, or leg pain/swelling.      Allergies:  Allergies   Allergen Reactions     Fluticasone-Salmeterol Shortness Of Breath     Hypoxia       Augmentin Nausea and Vomiting     Vertigo     Doxycycline Headache and Nausea     Fumaric Acid Itching     Morphine Itching     Penicillins Other (See Comments)     vertigo      Sulfa Drugs Headache              Problem List:    Patient Active Problem List    Diagnosis Date Noted     Colon polyps 09/03/2019     Priority: Medium     Tubular adenoma and hyperplastic polyps       Pulmonary nodule 01/21/2015     Priority: Medium     COPD (chronic obstructive pulmonary disease) (H) 02/27/2013     Priority: Medium     Advanced directives, counseling/discussion 05/04/2012     Priority: Medium     Discussed advance care planning with patient; information given to patient to review. 5/4/2012        Shannon Sesay  GLENN Lopez  20 1731         Meniere's disease 2007     Priority: Medium        Past Medical History:    Past Medical History:   Diagnosis Date     COPD (chronic obstructive pulmonary disease) (H)      Meniere's disease        Past Surgical History:    Past Surgical History:   Procedure Laterality Date     CHOLECYSTECTOMY       COLONOSCOPY N/A 2019    Procedure: COLONOSCOPY, WITH POLYPECTOMY AND BIOPSY;  Surgeon: Narciso Singh DO;  Location: [facility]       Family History:    Family History   Problem Relation Age of Onset     Respiratory Father         emphysema     Alzheimer Disease Maternal Grandmother      Cancer Maternal Grandfather         pancreatic cancer     Cancer Paternal Grandfather         lung     Gastrointestinal Disease Sister         diverticulis     Alzheimer Disease Mother        Social History:  Marital Status:   [2]  Social History     Tobacco Use     Smoking status: Former Smoker     Packs/day: 0.50     Years: 40.00     Pack years: 20.00     Types: Cigarettes     Quit date: 2012     Years since quittin.0     Smokeless tobacco: Never Used   Substance Use Topics     Alcohol use: Yes     Comment: very little     Drug use: No        Medications:         albuterol (PROVENTIL HFA: VENTOLIN HFA) 108 (90 BASE) MCG/ACT inhaler       Ascorbic Acid (VITAMIN C PO)       azithromycin (ZITHROMAX Z-SUNNI) 250 MG tablet       Glucosamine HCl (GLUCOSAMINE PO)       ibuprofen (ADVIL/MOTRIN) 200 MG tablet       LORazepam (ATIVAN) 1 MG tablet       meclizine (ANTIVERT) 25 MG tablet       Multiple Vitamin (MULTI-VITAMIN) per tablet       omeprazole (PRILOSEC) 20 MG DR capsule       predniSONE (DELTASONE) 20 MG tablet       probiotic CAPS       tiotropium (SPIRIVA HANDIHALER) 18 MCG inhaled capsule       diclofenac (VOLTAREN) 1 % topical gel          Review of Systems   Constitutional: Positive for fatigue and unexpected weight change. Negative for fever.   HENT: Negative.   "  Respiratory: Positive for cough, chest tightness, shortness of breath and wheezing.    Cardiovascular: Negative.  Negative for chest pain and leg swelling.   Gastrointestinal: Negative.    Musculoskeletal: Negative.    Skin: Negative.    Neurological: Positive for headaches.   All other systems reviewed and are negative.      Physical Exam   BP: (!) 167/109  Pulse: 78  Temp: 98.2  F (36.8  C)  Resp: 18  Height: 161.3 cm (5' 3.5\")  Weight: 68 kg (150 lb)  SpO2: 97 %      Physical Exam  Constitutional:       General: She is not in acute distress.     Appearance: She is well-developed. She is not ill-appearing, toxic-appearing or diaphoretic.   HENT:      Head: Normocephalic and atraumatic.      Nose: Nose normal.      Mouth/Throat:      Lips: Pink.      Mouth: Mucous membranes are moist.      Pharynx: No oropharyngeal exudate or posterior oropharyngeal erythema.   Eyes:      Conjunctiva/sclera: Conjunctivae normal.      Pupils: Pupils are equal, round, and reactive to light.   Neck:      Musculoskeletal: Normal range of motion and neck supple.   Cardiovascular:      Rate and Rhythm: Normal rate and regular rhythm.      Heart sounds: Normal heart sounds.   Pulmonary:      Effort: Pulmonary effort is normal. No tachypnea or respiratory distress.      Breath sounds: Normal breath sounds. Decreased air movement present. No stridor. No wheezing, rhonchi or rales.   Abdominal:      Palpations: Abdomen is soft.      Tenderness: There is no abdominal tenderness.   Musculoskeletal: Normal range of motion.         General: No swelling or tenderness.      Right lower leg: No edema.      Left lower leg: No edema.   Skin:     General: Skin is warm and dry.   Neurological:      Mental Status: She is alert and oriented to person, place, and time.         ED Course        Procedures         EKG Interpretation:      Interpreted by Shannon Sesay PA-C  Symptoms at time of EKG: None   Rhythm: Normal sinus   Rate: Normal  Axis: " Normal  Ectopy: None  Conduction: Normal  ST Segments/ T Waves: Non-specific ST-T wave changes, Poor R wave progression  Q Waves: None  Comparison to prior: No old EKG available    Clinical Impression: Non-specific EKG      Results for orders placed or performed during the hospital encounter of 12/01/20 (from the past 24 hour(s))   XR Chest Port 1 View    Narrative    CHEST PORTABLE ONE VIEW   12/1/2020 2:33 PM     HISTORY: Cough, shortness of breath.    COMPARISON: 11/14/2012.      Impression    IMPRESSION: Mildly increased vascular congestion and interstitial  prominence that could be mild interstitial edema. Borderline  cardiomegaly newly seen. No effusions.   CBC with platelets differential   Result Value Ref Range    WBC 6.1 4.0 - 11.0 10e9/L    RBC Count 4.65 3.8 - 5.2 10e12/L    Hemoglobin 14.1 11.7 - 15.7 g/dL    Hematocrit 43.5 35.0 - 47.0 %    MCV 94 78 - 100 fl    MCH 30.3 26.5 - 33.0 pg    MCHC 32.4 31.5 - 36.5 g/dL    RDW 12.6 10.0 - 15.0 %    Platelet Count 309 150 - 450 10e9/L    Diff Method Automated Method     % Neutrophils 45.5 %    % Lymphocytes 43.9 %    % Monocytes 6.9 %    % Eosinophils 2.6 %    % Basophils 0.8 %    % Immature Granulocytes 0.3 %    Nucleated RBCs 0 0 /100    Absolute Neutrophil 2.8 1.6 - 8.3 10e9/L    Absolute Lymphocytes 2.7 0.8 - 5.3 10e9/L    Absolute Monocytes 0.4 0.0 - 1.3 10e9/L    Absolute Eosinophils 0.2 0.0 - 0.7 10e9/L    Absolute Basophils 0.1 0.0 - 0.2 10e9/L    Abs Immature Granulocytes 0.0 0 - 0.4 10e9/L    Absolute Nucleated RBC 0.0    Comprehensive metabolic panel   Result Value Ref Range    Sodium 140 133 - 144 mmol/L    Potassium 4.3 3.4 - 5.3 mmol/L    Chloride 107 94 - 109 mmol/L    Carbon Dioxide 29 20 - 32 mmol/L    Anion Gap 4 3 - 14 mmol/L    Glucose 88 70 - 99 mg/dL    Urea Nitrogen 12 7 - 30 mg/dL    Creatinine 0.73 0.52 - 1.04 mg/dL    GFR Estimate 86 >60 mL/min/[1.73_m2]    GFR Estimate If Black >90 >60 mL/min/[1.73_m2]    Calcium 8.9 8.5 - 10.1  mg/dL    Bilirubin Total 1.0 0.2 - 1.3 mg/dL    Albumin 4.3 3.4 - 5.0 g/dL    Protein Total 7.3 6.8 - 8.8 g/dL    Alkaline Phosphatase 75 40 - 150 U/L    ALT 29 0 - 50 U/L    AST 21 0 - 45 U/L   Troponin I   Result Value Ref Range    Troponin I ES <0.015 0.000 - 0.045 ug/L   Nt probnp inpatient (BNP)   Result Value Ref Range    N-Terminal Pro BNP Inpatient 52 0 - 900 pg/mL   CT Chest Pulmonary Embolism w Contrast    Narrative    CT CHEST PULMONARY EMBOLISM WITH CONTRAST 12/1/2020 4:46 PM    CLINICAL HISTORY: Progressive shortness of breath, cough.    TECHNIQUE: CT angiogram chest during arterial phase injection IV  contrast. 2D and 3D MIP reconstructions were performed by the CT  technologist. Dose reduction techniques were used.     CONTRAST: 69 mL Isovue 370    COMPARISON: CT chest 4/16/2020. CT chest 11/8/2018.    FINDINGS:  ANGIOGRAM CHEST: Pulmonary arteries are normal caliber and negative  for pulmonary emboli. Thoracic aorta is negative for dissection. Mild  coronary artery calcification.    LUNGS AND PLEURA: No effusions. Stable 0.4 cm right midlung  groundglass nodule series 7 image 126. Stable calcified granuloma at  the left lower lobe. Mild bibasilar atelectasis.    MEDIASTINUM/AXILLAE: No acute abnormality is seen.    UPPER ABDOMEN: Upper abdomen shows no acute abnormality.  Cholecystectomy. Stable splenic calcification. A few stable small  hepatic cysts are noted.    MUSCULOSKELETAL: No aggressive bone lesion identified.      Impression    IMPRESSION:  1.  No evidence for pulmonary embolism or other acute abnormality.  2.  Mild coronary artery calcification.  3.  Stable right-sided small pulmonary nodule. This appears stable  comparing to an older CT chest from 11/8/2018. Therefore it is a  benign nodule with no further follow-up imaging recommended.       Medications   iopamidol (ISOVUE-370) solution 69 mL (69 mLs Intravenous Given 12/1/20 9770)   sodium chloride 0.9 % bag 500mL for CT scan flush use  (96 mLs As instructed Given 12/1/20 5711)       Assessments & Plan (with Medical Decision Making)     Pt is a 65 year old female with history of COPD who presents with complaints of worsening wheezing, cough, chest tightness, and shortness of breath over the past 3 days.  Patient states her symptoms feel consistent with her past COPD exacerbations.  Patient states she feels like she needs to cough up phlegm but nothing comes up.  Patient has been using her Albuterol inhaler with minimal improvement.  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  Pt does admit that she has noticed increase in shortness of breath with exertion and weight gain over the past several months.  Pt has not had to prop herself up to sleep.  She states she becomes very fatigued and winded with climbing stairs.  Pt denies hx heart disease or heart failure.  She has never had a cardiac echo.      Pt is afebrile on arrival.  Exam as above.  Patient is not hypoxic.  No lower extremity edema on exam.  COVID-19 testing was obtained and is currently pending.  Portable chest x-ray shows mildly increased vascular congestion and interstitial prominence that could be mild interstitial edema.  Borderline cardiomegaly newly seen as well.  No effusions.  Given this finding of increased vascular congestion and interstitial prominence, blood work was obtained along with an EKG.  No ischemic changes noted on EKG.  Troponin is undetectable.  proBNP is not elevated.  No leukocytosis on CBC.  Normal hemoglobin.  Comprehensive metabolic panel is unremarkable.  CT scan of the chest was negative for PE or other acute abnormality.  Discussed results with patient.  Discussed with patient that I suspect her symptoms may be multifactorial.  She does have history of COPD and is reporting an increase in cough and shortness of breath and will therefore treat for possible COPD exacerbation with steroids and antibiotics for  atypical infection.  I am also concerned for possible pulmonary edema contributing to her symptoms and recommend she schedule an outpatient echo with her primary care provider.  She reports increased weight gain and dyspnea on exertion further supporting the necessity to further evaluate her heart.  Patient expresses understanding of and agreement with the plan.  Return precautions were reviewed.  Hand-outs were provided.    Patient was sent with Azithromycin and Prednisone burst and was instructed to follow-up with PCP if no improvement in 3-5 days for continued care and management or sooner if new or worsening symptoms.  She is to return to the ED for persistent and/or worsening symptoms.  Patient expressed understanding of the diagnosis and plan and was discharged home in good condition.    I have reviewed the nursing notes.    I have reviewed the findings, diagnosis, plan and need for follow up with the patient.    New Prescriptions    AZITHROMYCIN (ZITHROMAX Z-SUNNI) 250 MG TABLET    Two tablets on the first day, then one tablet daily for the next 4 days    PREDNISONE (DELTASONE) 20 MG TABLET    Take 1 tablet (20 mg) by mouth 2 times daily for 5 days       Final diagnoses:   Shortness of breath   COPD exacerbation (H)   Encounter for laboratory testing for COVID-19 virus       12/1/2020   [facility] EMERGENCY DEPT      Disclaimer:  This note consists of symbols derived from keyboarding, dictation and/or voice recognition software.  As a result, there may be errors in the script that have gone undetected.  Please consider this when interpreting information found in this chart.

## 2020-12-01 NOTE — ED NOTES
Pt comes in with SOB with exertion and feeling like she can't quite catch her breath. Has hx of COPD. Feels like its an exacerbation of that. States she usually gets some steroids and a z-pack when this happens. Pt has diminished lung sounds. Vitals stable. States she feels like she has to cough but is unable to cough anything up.   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

## 2020-12-01 NOTE — DISCHARGE INSTRUCTIONS
Suspect your symptoms are multifactorial.  Will start Azithromycin and Prednisone for treatment of possible COPD flare involvement.    Recommend following up with your primary care provider in order to schedule an echo to assess your heart function especially given appearance of increased pulmonary vasculature versus edema on x-ray and given your increase in dyspnea with exertion and report of weight gain.  Your Covid test is pending and results should be back in the next 24-48 hours

## 2020-12-01 NOTE — ED AVS SNAPSHOT
[facility]  [address] [address]  Phone: [phone]  Fax: [phone]                                    Dasia Reynolds   MRN: [id number]    Department: [facility]   Date of Visit: 12/1/2020           After Visit Summary Signature Page    I have received my discharge instructions, and my questions have been answered. I have discussed any challenges I see with this plan with the nurse or doctor.    ..........................................................................................................................................  Patient/Patient Representative Signature      ..........................................................................................................................................  Patient Representative Print Name and Relationship to Patient    ..................................................               ................................................  Date                                   Time    ..........................................................................................................................................  Reviewed by Signature/Title    ...................................................              ..............................................  Date                                               Time          22EPIC Rev 08/18

## 2020-12-01 NOTE — LETTER
December 1, 2020      To Whom It May Concern:      Dasia Reynolds was seen in our Emergency Department today, 12/01/20.  Please excuse from work until covid-19 tests are available.  Thank you.      Sincerely,        Shannon Sesay PA-C

## 2020-12-02 LAB
SARS-COV-2 RNA SPEC QL NAA+PROBE: NOT DETECTED
SPECIMEN SOURCE: NORMAL

## 2020-12-11 ENCOUNTER — HOSPITAL ENCOUNTER (OUTPATIENT)
Dept: CARDIOLOGY | Facility: CLINIC | Age: 65
Discharge: HOME | End: 2020-12-11
Attending: FAMILY MEDICINE | Admitting: FAMILY MEDICINE
Payer: COMMERCIAL

## 2020-12-11 DIAGNOSIS — R06.02 SOB (SHORTNESS OF BREATH): ICD-10-CM

## 2020-12-11 PROCEDURE — 93306 TTE W/DOPPLER COMPLETE: CPT | Mod: 26 | Performed by: INTERNAL MEDICINE

## 2020-12-11 PROCEDURE — 93306 TTE W/DOPPLER COMPLETE: CPT

## 2021-01-04 ENCOUNTER — TELEPHONE (OUTPATIENT)
Dept: URGENT CARE | Facility: URGENT CARE | Age: 66
End: 2021-01-04
Payer: COMMERCIAL

## 2021-01-04 DIAGNOSIS — M17.11 PRIMARY OSTEOARTHRITIS OF RIGHT KNEE: Primary | ICD-10-CM

## 2021-01-04 NOTE — TELEPHONE ENCOUNTER
Patient would like to set up another appt for injection of the R knee. Please call patient at [phone]. Patient says ok to leave voicemail if no answer.

## 2021-01-04 NOTE — TELEPHONE ENCOUNTER
Patient scheduled for appointment on 1/15/21 for discussion of viscosupplementation injection vs steroid injection of right knee.      SynviscOne injection last completed 1/10/20       Prior authorization referral for SynviscOne injection pended.     Please advise.    Christa Osman, ATC

## 2021-01-10 ENCOUNTER — HEALTH MAINTENANCE LETTER (OUTPATIENT)
Age: 66
End: 2021-01-10

## 2021-01-15 ENCOUNTER — OFFICE VISIT (OUTPATIENT)
Dept: ORTHOPEDICS | Facility: CLINIC | Age: 66
End: 2021-01-15
Payer: COMMERCIAL

## 2021-01-15 VITALS — WEIGHT: 150 LBS | BODY MASS INDEX: 25.61 KG/M2 | HEIGHT: 64 IN

## 2021-01-15 DIAGNOSIS — M17.11 PRIMARY OSTEOARTHRITIS OF RIGHT KNEE: Primary | ICD-10-CM

## 2021-01-15 PROCEDURE — 20611 DRAIN/INJ JOINT/BURSA W/US: CPT | Mod: RT | Performed by: FAMILY MEDICINE

## 2021-01-15 ASSESSMENT — MIFFLIN-ST. JEOR: SCORE: 1197.46

## 2021-01-15 NOTE — PATIENT INSTRUCTIONS
[facility] Injection Discharge Instructions    Procedure: Right knee Synvisc Injection      You may shower, however avoid swimming, tub baths or hot tubs for 24 hours following your procedure    You may have a mild to moderate increase in pain for several days following the injection.    It may take up to 14 days for the steroid medication to start working although you may feel the effect as early as a few days after the procedure.    You may use ice packs for 10-15 minutes, 3 to 4 times a day at the injection site for comfort    You may use anti-inflammatory medications (such as Ibuprofen or Aleve or Advil) or Tylenol for pain control if necessary    If you were fasting, you may resume your normal diet and medications after the procedure      If you experience any of the following, call [facility] @ [phone] or [phone]  -Fever over 100 degree F  -Swelling, bleeding, redness, drainage, warmth at the injection site  - New or worsening pain    It was great seeing you again today!    Jareth Smith

## 2021-01-15 NOTE — LETTER
1/15/2021         RE: Dasia Reynolds  [address]  [place] [place] [facility] [place] [address]        Dear Colleague,    Thank you for referring your patient, Dasia Reynolds, to the [facility]. Please see a copy of my visit note below.    Large Joint Injection/Arthocentesis: R knee joint    Date/Time: 1/15/2021 8:08 AM  Performed by: Jareth Smith MD  Authorized by: Jareth Smith MD     Indications:  Pain and osteoarthritis  Needle Size:  21 G  Guidance: ultrasound    Approach:  Superolateral  Location:  Knee      Medications:  48 mg hylan 48 MG/6ML  Outcome:  Tolerated well, no immediate complications  Procedure discussed: discussed risks, benefits, and alternatives    Consent Given by:  Patient  Timeout: timeout called immediately prior to procedure    Prep: patient was prepped and draped in usual sterile fashion     Patient tolerated hyaluronic acid injection in right knee joint today.  Aftercare instructions given to patient.  Plan to follow-up as needed for repeat procedures.      Jareth Smith MD [facility]                Again, thank you for allowing me to participate in the care of your patient.        Sincerely,        Jareth Smith MD

## 2021-01-15 NOTE — PROGRESS NOTES
Large Joint Injection/Arthocentesis: R knee joint    Date/Time: 1/15/2021 8:08 AM  Performed by: Jareth Smith MD  Authorized by: Jareth Smith MD     Indications:  Pain and osteoarthritis  Needle Size:  21 G  Guidance: ultrasound    Approach:  Superolateral  Location:  Knee      Medications:  48 mg hylan 48 MG/6ML  Outcome:  Tolerated well, no immediate complications  Procedure discussed: discussed risks, benefits, and alternatives    Consent Given by:  Patient  Timeout: timeout called immediately prior to procedure    Prep: patient was prepped and draped in usual sterile fashion     Patient tolerated hyaluronic acid injection in right knee joint today.  Aftercare instructions given to patient.  Plan to follow-up as needed for repeat procedures.      Jareth Smith MD [facility]

## 2021-01-19 ENCOUNTER — TELEPHONE (OUTPATIENT)
Dept: URGENT CARE | Facility: URGENT CARE | Age: 66
End: 2021-01-19

## 2021-01-19 NOTE — TELEPHONE ENCOUNTER
Spoke to patient discussed she having residual soreness following her SynviscOne injection.  Patient does not reports any new swelling or severe pain following the procedure, unlikely to be reaction response at this point.  Reassured patient that it may take 4 - 6 weeks to note the full benefit of injection.  She will continue with RICE therapy and OTC: tylenol as needed at this time.  Patient expressed understanding and had no further questions.    Gigi Mancilla ATC

## 2021-01-19 NOTE — TELEPHONE ENCOUNTER
Reason for call:    Symptom or request:     Patient called stating she is continues to have pain following injection she had on Friday 1/15, pain is on the inside.     Using tylenol 625 mg -two tablets every 8 hours        Best Time:  any    Can we leave a detailed message on this number?  YES     Amanda GARCIA  Station

## 2021-02-24 ENCOUNTER — TELEPHONE (OUTPATIENT)
Dept: URGENT CARE | Facility: URGENT CARE | Age: 66
End: 2021-02-24

## 2021-02-24 DIAGNOSIS — M17.11 PRIMARY OSTEOARTHRITIS OF RIGHT KNEE: Primary | ICD-10-CM

## 2021-02-24 NOTE — TELEPHONE ENCOUNTER
Called and spoke with patient.  She would like to try therapy.  She has had some relief, but does not feel the relief is as good as she has experienced in the past.  We talked about possible steroid injection.  She would like to discuss this with Dr. Smith, as that is who has done her injections.  Appointment made for 3/1/21.  Order placed for PT>     Jazz Stover MS ATC

## 2021-02-24 NOTE — TELEPHONE ENCOUNTER
Would contact patient to get more specifics and triage her concerns.  I haven't seen her in clinic in ~ 1 year, x-rays are 1.5 years old.  If her new pain is concerning or different, follow up visit would be recommended.  If new pain is similar to previous arthritis related pain, could start with PT with close follow up.    Shila Craig MD

## 2021-02-24 NOTE — TELEPHONE ENCOUNTER
Patient last seen for OV 3/4/20 by Dr. Craig at which time PT referral was discussed.  Please advise regarding PT referral vs follow up OV.    Catarina Red, ATC

## 2021-02-24 NOTE — TELEPHONE ENCOUNTER
Reason for call:  Patient reporting a symptom    Symptom or request: Pt states she spoke to nurse a couple weeks after injections in Jan -was in a lot of pain.  Had a few days of good knee days but now it's back again.  Wondering about physical therapy and if insurance would cover.  Is not ready for replacement yet.      Duration (how long have symptoms been present):     Have you been treated for this before? Yes    Additional comments:     Phone Number patient can be reached at:  Home number on file [phone] (home)    Best Time:      Can we leave a detailed message on this number:  YES    Call taken on 2/24/2021 at 10:44 AM by Irlanda Guadarrama

## 2021-03-01 ENCOUNTER — ANCILLARY PROCEDURE (OUTPATIENT)
Dept: GENERAL RADIOLOGY | Facility: CLINIC | Age: 66
End: 2021-03-01
Attending: FAMILY MEDICINE
Payer: COMMERCIAL

## 2021-03-01 ENCOUNTER — OFFICE VISIT (OUTPATIENT)
Dept: ORTHOPEDICS | Facility: CLINIC | Age: 66
End: 2021-03-01
Payer: COMMERCIAL

## 2021-03-01 VITALS — BODY MASS INDEX: 26.15 KG/M2 | HEIGHT: 64 IN | SYSTOLIC BLOOD PRESSURE: 124 MMHG | DIASTOLIC BLOOD PRESSURE: 82 MMHG

## 2021-03-01 DIAGNOSIS — M17.11 LOCALIZED OSTEOARTHRITIS OF RIGHT KNEE: Primary | ICD-10-CM

## 2021-03-01 DIAGNOSIS — M25.561 RIGHT KNEE PAIN: ICD-10-CM

## 2021-03-01 PROCEDURE — 20611 DRAIN/INJ JOINT/BURSA W/US: CPT | Mod: RT | Performed by: FAMILY MEDICINE

## 2021-03-01 PROCEDURE — 73562 X-RAY EXAM OF KNEE 3: CPT | Performed by: RADIOLOGY

## 2021-03-01 PROCEDURE — 99214 OFFICE O/P EST MOD 30 MIN: CPT | Mod: 25 | Performed by: FAMILY MEDICINE

## 2021-03-01 RX ADMIN — BETAMETHASONE SODIUM PHOSPHATE AND BETAMETHASONE ACETATE 6 MG: 3; 3 INJECTION, SUSPENSION INTRA-ARTICULAR; INTRALESIONAL; INTRAMUSCULAR; SOFT TISSUE at 16:27

## 2021-03-01 RX ADMIN — ROPIVACAINE HYDROCHLORIDE 3 ML: 5 INJECTION, SOLUTION EPIDURAL; INFILTRATION; PERINEURAL at 16:27

## 2021-03-01 NOTE — PATIENT INSTRUCTIONS
# Right Knee Arthritis: Pain flare up after Synvisc injection on 1/21 with medial knee pain flared up.  Pain over medial joint line with repeat x-rays showing progression of right knee ostearthritis.   Patient is starting physical therapy.    Image Findings: right medial knee osteoarthritis   Treatment: Right knee steroid injection, physical therapy, referral for knee brace   Job: no restrictions   Medications/Injections: Limited tylenol/ibuprofen for pain for 1-2 weeks  Follow-up: In one month if symptoms do not improve, sooner if worsening    Please call [phone]   Ask for my team if you have any questions or concerns    It was great seeing you again today!    Jareth Smith MD, [facility] Injection Discharge Instructions    Procedure: Right knee steroid injection      You may shower, however avoid swimming, tub baths or hot tubs for 24 hours following your procedure    You may have a mild to moderate increase in pain for several days following the injection.    It may take up to 14 days for the steroid medication to start working although you may feel the effect as early as a few days after the procedure.    You may use ice packs for 10-15 minutes, 3 to 4 times a day at the injection site for comfort    You may use anti-inflammatory medications (such as Ibuprofen or Aleve or Advil) or Tylenol for pain control if necessary    If you were fasting, you may resume your normal diet and medications after the procedure    If you have diabetes, check your blood sugar more frequently than usual as your blood sugar may be higher than normal for 10-14 days following a steroid injection. Contact your doctor who manages your diabetes if your blood sugar is higher than usual      If you experience any of the following, call [facility] @ [phone] or [phone]  -Fever over 100 degree F  -Swelling, bleeding, redness, drainage, warmth at the injection site  - New or worsening pain

## 2021-03-01 NOTE — LETTER
3/1/2021         RE: Dasia Reynolds  [id number]  [facility] [address]        Dear Colleague,    Thank you for referring your patient, Dasia Reynolds, to the [facility] [place]. Please see a copy of my visit note below.    ASSESSMENT & PLAN  Dasia was seen today for pain.    Diagnoses and all orders for this visit:    Localized osteoarthritis of right knee  -     XR Knee Standing AP Bilat [place] Bilat Lat Right; Future  -     Orthotics, Prosthetics and Custom Compression Order for DME - ONLY FOR DME  -     Large Joint Injection/Arthocentesis: R knee joint    Right knee pain  -     XR Knee Standing AP Bilat [place] Bilat Lat Right; Future  -     Orthotics, Prosthetics and Custom Compression Order for DME - ONLY FOR DME  -     Large Joint Injection/Arthocentesis: R knee joint      Patient is a 65 year old female presenting for evaluation of   Chief Complaint   Patient presents with     Right Knee - Pain      # Right Knee Arthritis: Pain flare up after Synvisc injection on 1/21 with medial knee pain flared up.  Pain over medial joint line with repeat x-rays showing progression of right knee ostearthritis.   Patient is starting physical therapy.  Pt wants to avoid total knee arthroplasty.  Plan to treat as below.    Image Findings: right medial knee osteoarthritis   Treatment: Right knee steroid injection, physical therapy, referral for knee brace   Job: no restrictions   Medications/Injections: Limited tylenol/ibuprofen for pain for 1-2 weeks  Follow-up: In one month if symptoms do not improve, sooner if worsening    Concerning signs/sx that would warrant urgent evaluation were discussed.  All questions were answered, patient understands and agrees with plan.      Return in about 1 month (around 4/1/2021), or if symptoms worsen or fail to improve.    -----    SUBJECTIVE  Dasia Reynolds is a/an 65 year old female who is seen as a self referral for evaluation of right knee  pain. The patient is seen by themselves.    Onset: Several years(s) ago. Reports insidious onset without acute precipitating event.  Location of Pain: right medial knee   Rating of Pain at worst: 8/10  Rating of Pain Currently: 8/10  Worsened by: walking, standing,  Better with: sitting  Treatments tried: physical therapy, Ibuprofen, ice Synvisc injections 1/10/20 and 1/15/21   Associated symptoms: locking, grinding, catching   Orthopedic history: YES - chronic knee pain  Relevant surgical history: NO  Past Medical History:   Diagnosis Date     COPD (chronic obstructive pulmonary disease) (H)      Meniere's disease      Social History     Socioeconomic History     Marital status: Single     Spouse name: None     Number of children: None     Years of education: None     Highest education level: None   Occupational History     None   Social Needs     Financial resource strain: None     Food insecurity     Worry: None     Inability: None     Transportation needs     Medical: None     Non-medical: None   Tobacco Use     Smoking status: Former Smoker     Packs/day: 0.50     Years: 40.00     Pack years: 20.00     Types: Cigarettes     Quit date: 2012     Years since quittin.2     Smokeless tobacco: Never Used   Substance and Sexual Activity     Alcohol use: Yes     Comment: very little     Drug use: No     Sexual activity: Yes     Partners: Male   Lifestyle     Physical activity     Days per week: None     Minutes per session: None     Stress: None   Relationships     Social connections     Talks on phone: None     Gets together: None     Attends Sikhism service: None     Active member of club or organization: None     Attends meetings of clubs or organizations: None     Relationship status: None     Intimate partner violence     Fear of current or ex partner: None     Emotionally abused: None     Physically abused: None     Forced sexual activity: None   Other Topics Concern     Parent/sibling w/ CABG, MI or  "angioplasty before 65F 55M? No   Social History Narrative    Works as PCA. Divorsed         Patient's past medical, surgical, social, and family histories were reviewed today and no changes are noted.    REVIEW OF SYSTEMS:  10 point ROS is negative other than symptoms noted above in HPI, Past Medical History or as stated below  Constitutional: NEGATIVE for fever, chills, change in weight  Skin: NEGATIVE for worrisome rashes, moles or lesions  GI/: NEGATIVE for bowel or bladder changes  Neuro: NEGATIVE for weakness, dizziness or paresthesias    OBJECTIVE:  /82   Ht 1.613 m (5' 3.5\")   LMP 09/11/2001   BMI 26.15 kg/m     General: healthy, alert and in no distress  HEENT: no scleral icterus or conjunctival erythema  Skin: no suspicious lesions or rash. No jaundice.  CV: no pedal edema  Resp: normal respiratory effort without conversational dyspnea   Psych: normal mood and affect  Gait: normal steady gait with appropriate coordination and balance  Neuro: Normal light sensory exam of lower extremity  MSK:  RIGHT KNEE  Inspection:    normal alignment  Palpation:    Tender about the lateral patellar facet, medial patellar facet, lateral joint line and medial joint line. Remainder of bony and ligamentous landmarks are nontender.    No effusion is present    Patellofemoral crepitus is Present  Range of Motion:     00 extension to 1350 flexion  Strength:    Quadriceps 5/5, hamstrings 5/5, gastrocsoleus 5/5 and tibialis anterior 5/5    Extensor mechanism intact  Special Tests:    Positive: Patellar grind    Negative: MCL/valgus stress (0 & 30 deg), LCL/varus stress (0 & 30 deg), Lachman's, anterior drawer, posterior drawer, Yvan's    Independent visualization of the below image:  No results found for this or any previous visit (from the past 24 hour(s)).    XR KNEE STANDING AP BILATERAL [place] BILATERAL LATERAL RIGHT    3/1/2021 4:13 PM      HISTORY:  Right knee pain     COMPARISON: 12/18/2019.                  "                                                     IMPRESSION:     Right: Severe medial compartment osteoarthrosis. Mild patellofemoral  osteoarthrosis. The findings have progressed.     Left: Two of the views include the left knee and show no left knee  pathology.     JAYDEN ACOSTA MD  Large Joint Injection/Arthocentesis: R knee joint    Date/Time: 3/1/2021 4:27 PM  Performed by: Jareth Smith MD  Authorized by: Jareth Smith MD     Indications:  Pain and osteoarthritis  Needle Size:  25 G  Guidance: ultrasound    Approach:  Superolateral  Location:  Knee      Medications:  6 mg betamethasone acet & sod phos 6 (3-3) MG/ML; 3 mL ropivacaine 5 MG/ML  Medications comment:  Actual amount of ropivacaine used 4 mL  Outcome:  Tolerated well, no immediate complications  Procedure discussed: discussed risks, benefits, and alternatives    Consent Given by:  Patient  Timeout: timeout called immediately prior to procedure    Prep: patient was prepped and draped in usual sterile fashion     Patient counseled on risks of infection related to steroids and COVID-19.  Patient reported significant improvement of pain after injection.  Aftercare instructions given to patient.  Plan to follow-up as discussed above.     Jareth Smith MD [facility]            Jareth Smith MD, [facility]        Again, thank you for allowing me to participate in the care of your patient.        Sincerely,        Jareth Smith MD

## 2021-03-01 NOTE — PROGRESS NOTES
ASSESSMENT & PLAN  Dasia was seen today for pain.    Diagnoses and all orders for this visit:    Localized osteoarthritis of right knee  -     XR Knee Standing AP Bilat [place] Bilat Lat Right; Future  -     Orthotics, Prosthetics and Custom Compression Order for DME - ONLY FOR DME  -     Large Joint Injection/Arthocentesis: R knee joint    Right knee pain  -     XR Knee Standing AP Bilat [place] Bilat Lat Right; Future  -     Orthotics, Prosthetics and Custom Compression Order for DME - ONLY FOR DME  -     Large Joint Injection/Arthocentesis: R knee joint      Patient is a 65 year old female presenting for evaluation of   Chief Complaint   Patient presents with     Right Knee - Pain      # Right Knee Arthritis: Pain flare up after Synvisc injection on 1/21 with medial knee pain flared up.  Pain over medial joint line with repeat x-rays showing progression of right knee ostearthritis.   Patient is starting physical therapy.  Pt wants to avoid total knee arthroplasty.  Plan to treat as below.    Image Findings: right medial knee osteoarthritis   Treatment: Right knee steroid injection, physical therapy, referral for knee brace   Job: no restrictions   Medications/Injections: Limited tylenol/ibuprofen for pain for 1-2 weeks  Follow-up: In one month if symptoms do not improve, sooner if worsening    Concerning signs/sx that would warrant urgent evaluation were discussed.  All questions were answered, patient understands and agrees with plan.      Return in about 1 month (around 4/1/2021), or if symptoms worsen or fail to improve.    -----    SUBJECTIVE  Dasia Reynolds is a/an 65 year old female who is seen as a self referral for evaluation of right knee pain. The patient is seen by themselves.    Onset: Several years(s) ago. Reports insidious onset without acute precipitating event.  Location of Pain: right medial knee   Rating of Pain at worst: 8/10  Rating of Pain Currently: 8/10  Worsened by: walking,  standing,  Better with: sitting  Treatments tried: physical therapy, Ibuprofen, ice Synvisc injections 1/10/20 and 1/15/21   Associated symptoms: locking, grinding, catching   Orthopedic history: YES - chronic knee pain  Relevant surgical history: NO  Past Medical History:   Diagnosis Date     COPD (chronic obstructive pulmonary disease) (H)      Meniere's disease      Social History     Socioeconomic History     Marital status: Single     Spouse name: None     Number of children: None     Years of education: None     Highest education level: None   Occupational History     None   Social Needs     Financial resource strain: None     Food insecurity     Worry: None     Inability: None     Transportation needs     Medical: None     Non-medical: None   Tobacco Use     Smoking status: Former Smoker     Packs/day: 0.50     Years: 40.00     Pack years: 20.00     Types: Cigarettes     Quit date: 2012     Years since quittin.2     Smokeless tobacco: Never Used   Substance and Sexual Activity     Alcohol use: Yes     Comment: very little     Drug use: No     Sexual activity: Yes     Partners: Male   Lifestyle     Physical activity     Days per week: None     Minutes per session: None     Stress: None   Relationships     Social connections     Talks on phone: None     Gets together: None     Attends [employer] service: None     Active member of club or organization: None     Attends meetings of clubs or organizations: None     Relationship status: None     Intimate partner violence     Fear of current or ex partner: None     Emotionally abused: None     Physically abused: None     Forced sexual activity: None   Other Topics Concern     Parent/sibling w/ CABG, MI or angioplasty before 65F 55M? No   Social History Narrative    Works as PCA. Divorsed         Patient's past medical, surgical, social, and family histories were reviewed today and no changes are noted.    REVIEW OF SYSTEMS:  10 point ROS is negative other  "than symptoms noted above in HPI, Past Medical History or as stated below  Constitutional: NEGATIVE for fever, chills, change in weight  Skin: NEGATIVE for worrisome rashes, moles or lesions  GI/: NEGATIVE for bowel or bladder changes  Neuro: NEGATIVE for weakness, dizziness or paresthesias    OBJECTIVE:  /82   Ht 1.613 m (5' 3.5\")   LMP 09/11/2001   BMI 26.15 kg/m     General: healthy, alert and in no distress  HEENT: no scleral icterus or conjunctival erythema  Skin: no suspicious lesions or rash. No jaundice.  CV: no pedal edema  Resp: normal respiratory effort without conversational dyspnea   Psych: normal mood and affect  Gait: normal steady gait with appropriate coordination and balance  Neuro: Normal light sensory exam of lower extremity  MSK:  RIGHT KNEE  Inspection:    normal alignment  Palpation:    Tender about the lateral patellar facet, medial patellar facet, lateral joint line and medial joint line. Remainder of bony and ligamentous landmarks are nontender.    No effusion is present    Patellofemoral crepitus is Present  Range of Motion:     00 extension to 1350 flexion  Strength:    Quadriceps 5/5, hamstrings 5/5, gastrocsoleus 5/5 and tibialis anterior 5/5    Extensor mechanism intact  Special Tests:    Positive: Patellar grind    Negative: MCL/valgus stress (0 & 30 deg), LCL/varus stress (0 & 30 deg), Lachman's, anterior drawer, posterior drawer, Yvan's    Independent visualization of the below image:  No results found for this or any previous visit (from the past 24 hour(s)).    XR KNEE STANDING AP BILATERAL [place] BILATERAL LATERAL RIGHT    3/1/2021 4:13 PM      HISTORY:  Right knee pain     COMPARISON: 12/18/2019.                                                                      IMPRESSION:     Right: Severe medial compartment osteoarthrosis. Mild patellofemoral  osteoarthrosis. The findings have progressed.     Left: Two of the views include the left knee and show no left " knee  pathology.     JAYDEN ACOSTA MD  Large Joint Injection/Arthocentesis: R knee joint    Date/Time: 3/1/2021 4:27 PM  Performed by: Jareth Smith MD  Authorized by: Jareth Smith MD     Indications:  Pain and osteoarthritis  Needle Size:  25 G  Guidance: ultrasound    Approach:  Superolateral  Location:  Knee      Medications:  6 mg betamethasone acet & sod phos 6 (3-3) MG/ML; 3 mL ropivacaine 5 MG/ML  Medications comment:  Actual amount of ropivacaine used 4 mL  Outcome:  Tolerated well, no immediate complications  Procedure discussed: discussed risks, benefits, and alternatives    Consent Given by:  Patient  Timeout: timeout called immediately prior to procedure    Prep: patient was prepped and draped in usual sterile fashion     Patient counseled on risks of infection related to steroids and COVID-19.  Patient reported significant improvement of pain after injection.  Aftercare instructions given to patient.  Plan to follow-up as discussed above.     Jareth Smith MD [facility]            Jareth Smith MD, [facility]

## 2021-03-03 NOTE — TELEPHONE ENCOUNTER
Patient would like a hard copy of images left at [place] .      Will send patient [employer] message when images are available for  .    Christa Osman, ATC

## 2021-03-03 NOTE — TELEPHONE ENCOUNTER
Patient LVM requesting her imaging be sent to her via [employer].    Her knee is doing much better.    Any clarification or questions please call # [phone]

## 2021-03-04 RX ORDER — ROPIVACAINE HYDROCHLORIDE 5 MG/ML
3 INJECTION, SOLUTION EPIDURAL; INFILTRATION; PERINEURAL
Status: SHIPPED | OUTPATIENT
Start: 2021-03-01

## 2021-03-04 RX ORDER — BETAMETHASONE SODIUM PHOSPHATE AND BETAMETHASONE ACETATE 3; 3 MG/ML; MG/ML
6 INJECTION, SUSPENSION INTRA-ARTICULAR; INTRALESIONAL; INTRAMUSCULAR; SOFT TISSUE
Status: SHIPPED | OUTPATIENT
Start: 2021-03-01

## 2021-03-05 ENCOUNTER — MYC MEDICAL ADVICE (OUTPATIENT)
Dept: ORTHOPEDICS | Facility: CLINIC | Age: 66
End: 2021-03-05

## 2021-03-05 NOTE — TELEPHONE ENCOUNTER
MyChart message to patient letting her know hard copy of xrays were ready to .    Christa Osman, ATC

## 2021-03-22 ENCOUNTER — HOSPITAL ENCOUNTER (OUTPATIENT)
Dept: PHYSICAL THERAPY | Facility: CLINIC | Age: 66
Setting detail: THERAPIES SERIES
End: 2021-03-22
Attending: FAMILY MEDICINE
Payer: COMMERCIAL

## 2021-03-22 DIAGNOSIS — M17.11 PRIMARY OSTEOARTHRITIS OF RIGHT KNEE: ICD-10-CM

## 2021-03-22 PROCEDURE — 97110 THERAPEUTIC EXERCISES: CPT | Mod: GP | Performed by: PHYSICAL THERAPIST

## 2021-03-22 PROCEDURE — 97162 PT EVAL MOD COMPLEX 30 MIN: CPT | Mod: GP | Performed by: PHYSICAL THERAPIST

## 2021-03-22 NOTE — PROGRESS NOTES
Ortho Evaluation 03/22/21 1600   General Information   Type of Visit Initial OP Ortho PT Evaluation   Start of Care Date 03/22/21   Referring Physician Jareth Smith    Patient/Family Goals Statement Get in/out of car, walking, Stairs, Prolonged standing or sitting. Plans to have Total knee next year. Lose weight.    Orders Evaluate and Treat   Date of Order 02/24/21   Certification Required? Yes  ([facility] - Auth'd)   Medical Diagnosis OA of R Knee    Surgical/Medical history reviewed   (COPD, Menopause, OA, OP, ?RA?, Smoker )   Precautions/Limitations no known precautions/limitations   Special Instructions Taking glucosamine. has a neoprene brace also.    General Information Comments Had 2 injetions, Was supposed to be fitted for a brace. Needs orthotics or good arch support.    Body Part(s)   Body Part(s) Knee   Presentation and Etiology   Pertinent history of current problem (include personal factors and/or comorbidities that impact the POC) 2 years ago slipped on ice and landed on R knee. Thinks that what started it. Has been getting progresssively worse. First injection January 2020, did someone else doing them before that. 2nd injection was in January 2021, and beginning of March.    Impairments A. Pain;B. Decreased WB tolerance;C. Swelling;D. Decreased ROM;E. Decreased flexibility;F. Decreased strength and endurance;G. Impaired balance;H. Impaired gait;M. Locking or catching;N. Headaches   Functional Limitations perform desired leisure / sports activities;perform required work activities   Symptom Location Pin medial side of knee.    How/Where did it occur With a fall;From Degenerative Joint Disease   Onset date of current episode/exacerbation 02/24/21  (MD order avelina. )   Chronicity Chronic   Pain rating (0-10 point scale)   (5-10/10 )   Pain quality A. Sharp;B. Dull;C. Aching;D. Burning;E. Shooting;F. Stabbing   Frequency of pain/symptoms A. Constant   Pain/symptoms are: The same all the  time  (Varies )   Pain/symptoms exacerbated by A. Sitting;B. Walking;C. Lifting;D. Carrying;G. Certain positions;I. Bending;K. Home tasks;L. Work tasks   Pain/symptoms eased by C. Rest;E. Changing positions;G. Heat;H. Cold;I. OTC medication(s);J. Braces/supports   Progression of symptoms since onset: Unchanged   Current / Previous Interventions   Diagnostic Tests: X-ray   X-ray Results Results   X-ray results Severe Medical Compartment OA   Prior Level of Function   Functional Level Prior Comment Independent w/ADL's   Current Level of Function   Current Community Support Other  (Alone )   Patient role/employment history A. 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    Living environment Apartment/condo   Home/community accessibility Stairs, Railings    Current equipment-Gait/Locomotion Standard cane  (To get in/out of car. )   Fall Risk Screen   Fall screen completed by PT   Have you fallen 2 or more times in the past year? No   Have you fallen and had an injury in the past year? No   Timed Up and Go score (seconds) No balance issues and walks quickly into dept.    Is patient a fall risk? No   Abuse Screen (yes response referral indicated)   Feels Unsafe at Home or Work/School no   Feels Threatened by Someone no   Does Anyone Try to Keep You From Having Contact with Others or Doing Things Outside Your Home? no   Physical Signs of Abuse Present no   System Outcome Measures   Outcome Measures   (LEFS  45/80 )   Functional Scales   Functional Scales OPTIMAL   Optimal (1=able to do without difficulty, 2=able to do with little difficulty, 3=able to do with moderate difficulty, 4=able to do with much difficulty, 5=unable to do, 9=NA) Activity 1;Activity 2;Activity 3   Activity 1 comment Getting in/out of car moderate difficulty per LEFS    Activity 2 comment Walking any distance Moderate difficulty per LEFS   Activity 3 comment Prolonged standing Moderate difficulty per LEFS.    Knee Objective Findings   Observation  "No acute distress.    Integumentary  Larger R knee.    Posture Head forward   Gait/Locomotion Does not straighten R knee fully during ambulation.    Balance/Proprioception (Single Leg Stance) R 7, L 10 seconds w/ EO.    Foot Position In Standing Pronated R>L. Discussed Arch supports.    Knee ROM Comment PROM end range is painful w/ flex and extension.    Knee/Hip Strength Comments Knee Ext R 5-.    Knee Flexibility Comments B Hip Flex's tight, B Quads, B Soleus. R HS's tighter than L.    Step Test Height 6\"    Step Test Height Control Comment Good    Palpation Tender Medial R jt line.    Accessory Motion/Joint Mobility Decreased mobility of R Patella.   Planned Therapy Interventions   Planned Therapy Interventions Comment MT, Jt mobs, DEvelop HEP, PTRX# [id number]   Planned Modality Interventions   Planned Modality Interventions Comments Only if inidcated.    Clinical Impression   Criteria for Skilled Therapeutic Interventions Met yes, treatment indicated   PT Diagnosis Severe DJD Medial R knee, Mm imbalance, Decreased strength.   Influenced by the following impairments Pain, Impaired walking, Loss of ROM and strength.    Functional limitations due to impairments Mobility, Ability to exercise for weight management.    Clinical Presentation Evolving/Changing   Clinical Presentation Rationale LEFS, AROM, Flexibility, Pronated Feet R>L, Hx of COPD, OA, Smoker.    Clinical Decision Making (Complexity) Moderate complexity   Therapy Frequency 1 time/week   Predicted Duration of Therapy Intervention (days/wks) Chart was placed on hold for 1 month today, Pt to call if she wants more challenging ex's.    Risk & Benefits of therapy have been explained Yes   Patient, Family & other staff in agreement with plan of care Yes   Clinical Impression Comments Severe DJD Medial R knee, Mm imbalance, Decreased strength.   ORTHO GOALS   PT Ortho Eval Goals 1;2;3   Ortho Goal 1   Goal Identifier 1   Goal Description STG: Pt will be able " to walk 2 blocks w/ minimal difficulty per LEFS.    Target Date 04/22/21   Ortho Goal 2   Goal Identifier 2   Goal Description STG: Pt will be able to get in/out of car w/ minimal difficulty per LEFS.    Target Date 04/22/21   Ortho Goal 3   Goal Identifier 3   Goal Description LTG: Pt will be independent w/HEP to address Mm imbalances, Strength.    Target Date 04/22/21   Total Evaluation Time   PT Eval, Moderate Complexity Minutes (41218) 35   Therapy Certification   Certification date from 03/22/21   Certification date to 04/22/21   Medical Diagnosis OA of R Knee    Saniya Uribe, PT, MTC (#[id number])  [facility]           [phone]  Fax          [phone]  Appt #      [phone]

## 2021-03-22 NOTE — PROGRESS NOTES
[facility]          OUTPATIENT PHYSICAL THERAPY ORTHOPEDIC EVALUATION  PLAN OF TREATMENT FOR OUTPATIENT REHABILITATION  (COMPLETE FOR INITIAL CLAIMS ONLY)  Patient's Last Name, First Name, M.I.  YOB: 1955  Dasia Reynolds       Provider s Name:  [facility]   Medical Record No.  [id number]   Start of Care Date:  03/22/21   Onset Date:  02/24/21(MD bryanna quan. )   Type:     _X__PT   ___OT   ___SLP Medical Diagnosis:  OA of R Knee      PT Diagnosis:  Severe DJD Medial R knee, Mm imbalance, Decreased strength.   Visits from SOC:  1      _________________________________________________________________________________  Plan of Treatment/Functional Goals:     MT, Jt mobs, DEvelop HEP, PTRX# [id number]     Only if inidcated.   Goals  Goal Identifier: 1  Goal Description: STG: Pt will be able to walk 2 blocks w/ minimal difficulty per LEFS.   Target Date: 04/22/21    Goal Identifier: 2  Goal Description: STG: Pt will be able to get in/out of car w/ minimal difficulty per LEFS.   Target Date: 04/22/21    Goal Identifier: 3  Goal Description: LTG: Pt will be independent w/HEP to address Mm imbalances, Strength.   Target Date: 04/22/21    Therapy Frequency:  1 time/week  Predicted Duration of Therapy Intervention:  Chart was placed on hold for 1 month today per her wishes, Pt to call if she wants more challenging ex's.     Saniya Uribe, PT, MTC                 I CERTIFY THE NEED FOR THESE SERVICES FURNISHED UNDER        THIS PLAN OF TREATMENT AND WHILE UNDER MY CARE     (Physician co-signature of this document indicates review and certification of the therapy plan).                     Certification Date From:  03/22/21   Certification Date To:  04/22/21    Referring Provider:  Jareth Smith     Initial Assessment        See Epic Evaluation Start of Care Date: 03/22/21

## 2021-03-27 ENCOUNTER — HOSPITAL ENCOUNTER (OUTPATIENT)
Dept: MAMMOGRAPHY | Facility: CLINIC | Age: 66
Discharge: HOME | End: 2021-03-27
Attending: FAMILY MEDICINE | Admitting: FAMILY MEDICINE
Payer: COMMERCIAL

## 2021-03-27 DIAGNOSIS — Z12.31 SCREENING MAMMOGRAM FOR HIGH-RISK PATIENT: ICD-10-CM

## 2021-03-27 PROCEDURE — 77067 SCR MAMMO BI INCL CAD: CPT

## 2021-04-07 ENCOUNTER — TELEPHONE (OUTPATIENT)
Dept: URGENT CARE | Facility: URGENT CARE | Age: 66
End: 2021-04-07

## 2021-04-07 DIAGNOSIS — M17.11 PRIMARY OSTEOARTHRITIS OF RIGHT KNEE: Primary | ICD-10-CM

## 2021-04-07 NOTE — TELEPHONE ENCOUNTER
Reason for Call:  Pt is calling about an alterative knee brace. Pt can not use the knee brace that was proscribed. Pt see Dr. Smith for her knee.       Phone Number Patient can be reached at: Cell number on file:    Telephone Information:   Mobile [phone]       Best Time: any time    Can we leave a detailed message on this number? YES    Call taken on 4/7/2021 at 11:28 AM by Bailey Mcconnell

## 2021-04-07 NOTE — TELEPHONE ENCOUNTER
"Patient is requesting different knee brace. The 1st one isn't going to work out because of her \"job, and clothing.\"  Mary Beth Hancock RN    "

## 2021-04-09 NOTE — TELEPHONE ENCOUNTER
Contacted Dasia regarding knee brace and unable to find one that fit her correctly.  Talked to patients about knee brace.  Would offer a knee sleeve otherwise treat as previously mentioned otherwise definitive treatment would be total knee replacement.  Pt understands.      Jareth Smith MD

## 2021-05-08 ENCOUNTER — HEALTH MAINTENANCE LETTER (OUTPATIENT)
Age: 66
End: 2021-05-08

## 2021-05-26 ENCOUNTER — RECORDS - HEALTHEAST (OUTPATIENT)
Dept: ADMINISTRATIVE | Facility: CLINIC | Age: 66
End: 2021-05-26

## 2021-05-30 ENCOUNTER — RECORDS - HEALTHEAST (OUTPATIENT)
Dept: ADMINISTRATIVE | Facility: CLINIC | Age: 66
End: 2021-05-30

## 2021-05-31 ENCOUNTER — RECORDS - HEALTHEAST (OUTPATIENT)
Dept: ADMINISTRATIVE | Facility: CLINIC | Age: 66
End: 2021-05-31

## 2021-06-17 ENCOUNTER — HOSPITAL ENCOUNTER (EMERGENCY)
Facility: CLINIC | Age: 66
Discharge: HOME | End: 2021-06-17
Attending: EMERGENCY MEDICINE | Admitting: EMERGENCY MEDICINE
Payer: COMMERCIAL

## 2021-06-17 ENCOUNTER — APPOINTMENT (OUTPATIENT)
Dept: GENERAL RADIOLOGY | Facility: CLINIC | Age: 66
End: 2021-06-17
Attending: EMERGENCY MEDICINE
Payer: COMMERCIAL

## 2021-06-17 VITALS
RESPIRATION RATE: 18 BRPM | OXYGEN SATURATION: 94 % | TEMPERATURE: 97.8 F | HEART RATE: 73 BPM | DIASTOLIC BLOOD PRESSURE: 90 MMHG | HEIGHT: 64 IN | WEIGHT: 140 LBS | SYSTOLIC BLOOD PRESSURE: 142 MMHG | BODY MASS INDEX: 23.9 KG/M2

## 2021-06-17 DIAGNOSIS — S59.901A ELBOW INJURY, RIGHT, INITIAL ENCOUNTER: ICD-10-CM

## 2021-06-17 PROCEDURE — 99282 EMERGENCY DEPT VISIT SF MDM: CPT | Mod: 25 | Performed by: EMERGENCY MEDICINE

## 2021-06-17 PROCEDURE — 29105 APPLICATION LONG ARM SPLINT: CPT | Mod: RT | Performed by: EMERGENCY MEDICINE

## 2021-06-17 PROCEDURE — 73070 X-RAY EXAM OF ELBOW: CPT | Mod: RT

## 2021-06-17 PROCEDURE — 99283 EMERGENCY DEPT VISIT LOW MDM: CPT | Mod: 25 | Performed by: EMERGENCY MEDICINE

## 2021-06-17 ASSESSMENT — ENCOUNTER SYMPTOMS
SHORTNESS OF BREATH: 0
CHILLS: 0
NUMBNESS: 0
LIGHT-HEADEDNESS: 0
ABDOMINAL PAIN: 0
COUGH: 0
FEVER: 0
WOUND: 0
APPETITE CHANGE: 0
BACK PAIN: 0
CHEST TIGHTNESS: 0
HEADACHES: 0

## 2021-06-17 ASSESSMENT — MIFFLIN-ST. JEOR: SCORE: 1147.1

## 2021-06-17 NOTE — Clinical Note
Dasia Reynolds was seen and treated in our emergency department on 6/17/2021.  She may return to work on 06/18/2021.       If you have any questions or concerns, please don't hesitate to call.      Vamshi Samson MD

## 2021-06-17 NOTE — Clinical Note
Dasia Reynolds was seen and treated in our emergency department on 6/17/2021.         Sincerely,     [facility] Emergency Dept

## 2021-06-17 NOTE — ED PROVIDER NOTES
History     Chief Complaint   Patient presents with     Fall     fall wednesday, fell on elbow with severe pain. unable to manage pain at home     Arm Injury     right elbow pain. tylenol last at 0100     HPI  Dasia Reynolds is a 66 year old female with no significant contributing past medical history presenting for evaluation of a fall and injury to her right elbow.  Patient reports she was walking yesterday around lunchtime when her knee gave out causing her to fall to the ground directly onto her right elbow.  Patient had immediate severe pain but thought it may not be too severe.  She took some Tylenol and tried to continue her day.  Overnight her pain worsened and she decided to come in for evaluation.  Denies numbness or tingling nor weakness of the hand or arm.  Does report difficulty fully extending the arm at the elbow due to her pain and swelling.  Does report she previously broke her arm but that had healed.  Denies any other injury.  Denies striking her head or loss of consciousness.  Denies head or neck pain.        Allergies:  Allergies   Allergen Reactions     Fluticasone-Salmeterol Shortness Of Breath     Hypoxia       Augmentin Nausea and Vomiting     Vertigo     Doxycycline Headache and Nausea     Fumaric Acid Itching     Morphine Itching     Penicillins Other (See Comments)     vertigo      Sulfa Drugs Headache              Problem List:    Patient Active Problem List    Diagnosis Date Noted     Colon polyps 09/03/2019     Priority: Medium     Tubular adenoma and hyperplastic polyps       Pulmonary nodule 01/21/2015     Priority: Medium     COPD (chronic obstructive pulmonary disease) (H) 02/27/2013     Priority: Medium     Advanced directives, counseling/discussion 05/04/2012     Priority: Medium     Discussed advance care planning with patient; information given to patient to review. 5/4/2012          Meniere's disease 09/07/2007     Priority: Medium        Past Medical History:    Past Medical  History:   Diagnosis Date     COPD (chronic obstructive pulmonary disease) (H)      Meniere's disease        Past Surgical History:    Past Surgical History:   Procedure Laterality Date     CHOLECYSTECTOMY       COLONOSCOPY N/A 2019    Procedure: COLONOSCOPY, WITH POLYPECTOMY AND BIOPSY;  Surgeon: Narciso Singh DO;  Location: [facility] [facility]       Family History:    Family History   Problem Relation Age of Onset     Respiratory Father         emphysema     Alzheimer Disease Maternal Grandmother      Cancer Maternal Grandfather         pancreatic cancer     Cancer Paternal Grandfather         lung     Gastrointestinal Disease Sister         diverticulis     Alzheimer Disease Mother        Social History:  Marital Status:  Single [1]  Social History     Tobacco Use     Smoking status: Former Smoker     Packs/day: 0.50     Years: 40.00     Pack years: 20.00     Types: Cigarettes     Quit date: 2012     Years since quittin.5     Smokeless tobacco: Never Used   Substance Use Topics     Alcohol use: Yes     Comment: very little     Drug use: No        Medications:    albuterol (PROVENTIL HFA: VENTOLIN HFA) 108 (90 BASE) MCG/ACT inhaler  Ascorbic Acid (VITAMIN C PO)  diclofenac (VOLTAREN) 1 % topical gel  Glucosamine HCl (GLUCOSAMINE PO)  ibuprofen (ADVIL/MOTRIN) 200 MG tablet  LORazepam (ATIVAN) 1 MG tablet  meclizine (ANTIVERT) 25 MG tablet  Multiple Vitamin (MULTI-VITAMIN) per tablet  omeprazole (PRILOSEC) 20 MG DR capsule  probiotic CAPS  tiotropium (SPIRIVA HANDIHALER) 18 MCG inhaled capsule          Review of Systems   Constitutional: Negative for appetite change, chills and fever.   HENT: Negative for congestion.    Respiratory: Negative for cough, chest tightness and shortness of breath.    Cardiovascular: Negative for chest pain.   Gastrointestinal: Negative for abdominal pain.   Musculoskeletal: Negative for back pain.        Right elbow pain   Skin: Negative for rash and wound.  "  Neurological: Negative for syncope, light-headedness, numbness and headaches.   All other systems reviewed and are negative.      Physical Exam   BP: (!) 142/90  Pulse: 73  Temp: 97.8  F (36.6  C)  Resp: 18  Height: 161.3 cm (5' 3.5\")  Weight: 63.5 kg (140 lb)  SpO2: 94 %      Physical Exam  Vitals signs and nursing note reviewed.   Constitutional:       Appearance: Normal appearance. She is not ill-appearing or diaphoretic.   HENT:      Head: Atraumatic.      Nose: Nose normal.   Eyes:      Conjunctiva/sclera: Conjunctivae normal.   Neck:      Musculoskeletal: Normal range of motion.   Cardiovascular:      Rate and Rhythm: Normal rate.      Pulses: Normal pulses.   Pulmonary:      Effort: Pulmonary effort is normal.   Musculoskeletal:      Right elbow: She exhibits decreased range of motion and swelling. She exhibits no deformity. Tenderness found. Olecranon process tenderness noted.   Skin:     General: Skin is warm and dry.      Capillary Refill: Capillary refill takes less than 2 seconds.   Neurological:      Mental Status: She is alert and oriented to person, place, and time.   Psychiatric:         Mood and Affect: Mood normal.         ED [facility]    Splint Application    Date/Time: 6/17/2021 6:16 AM  Performed by: Vamshi Samson MD  Authorized by: Vamshi Samson MD       PRE-PROCEDURE DETAILS     Sensation:  Normal    PROCEDURE DETAILS     Laterality:  Right    Location:  Elbow    Elbow:  R elbow    Strapping: no      Splint type:  Long arm    Supplies:  Elastic bandage, Ortho-Glass, cotton padding and sling    POST PROCEDURE DETAILS     Pain:  Improved    Sensation:  Normal    Patient tolerance of procedure:  Patient tolerated the procedure well with no immediate complications    PROCEDURE   Patient Tolerance:  Patient tolerated the procedure well with no immediate complications                         Results for orders placed or performed " during the hospital encounter of 06/17/21 (from the past 24 hour(s))   Elbow XR, 2 views, right    Narrative    EXAM: XR ELBOW RIGHT 2 VIEWS  LOCATION: [facility]  DATE/TIME: 6/17/2021 5:07 AM    INDICATION: Fall, pain.  COMPARISON: None.      Impression    IMPRESSION: No definite evidence for displaced fracture or dislocation. However, there is an elbow effusion present which can be associated with occult fracture. Clinical correlation and follow-up as clinically warranted.                  Medications - No data to display        Assessments & Plan (with Medical Decision Making)  66-year-old female presenting for evaluation of injury to her right elbow.  Fell yesterday afternoon directly onto the elbow.  No other injuries.  Presenting for evaluation of ongoing severe pain and limited range of motion at the elbow without other injuries.  X-ray showed no acute fracture but did show an effusion.  Patient placed in a splint with a sling to support her elbow.  Recommended orthopedic follow-up for consideration of repeat imaging of potential occult fracture versus contusion.  Pain dramatically improved after splinting.     I have reviewed the nursing notes.    I have reviewed the findings, diagnosis, plan and need for follow up with the patient.       New Prescriptions    No medications on file       Final diagnoses:   Elbow injury, right, initial encounter - contusion vs occult fracture       6/17/2021   [facility] EMERGENCY DEPT     Samson, Vamshi Duran MD  06/17/21 0619

## 2021-10-23 ENCOUNTER — HEALTH MAINTENANCE LETTER (OUTPATIENT)
Age: 66
End: 2021-10-23

## 2021-10-29 ENCOUNTER — HOSPITAL ENCOUNTER (OUTPATIENT)
Dept: CT IMAGING | Facility: CLINIC | Age: 66
Discharge: HOME | End: 2021-10-29
Attending: FAMILY MEDICINE | Admitting: FAMILY MEDICINE
Payer: COMMERCIAL

## 2021-10-29 DIAGNOSIS — Z87.891 PERSONAL HISTORY OF NICOTINE DEPENDENCE: ICD-10-CM

## 2021-10-29 DIAGNOSIS — Z12.2 ENCOUNTER FOR SCREENING FOR LUNG CANCER: ICD-10-CM

## 2021-10-29 PROCEDURE — 71271 CT THORAX LUNG CANCER SCR C-: CPT

## 2022-03-17 ENCOUNTER — HOSPITAL ENCOUNTER (EMERGENCY)
Facility: CLINIC | Age: 67
Discharge: HOME | End: 2022-03-17
Attending: EMERGENCY MEDICINE | Admitting: EMERGENCY MEDICINE
Payer: COMMERCIAL

## 2022-03-17 ENCOUNTER — APPOINTMENT (OUTPATIENT)
Dept: GENERAL RADIOLOGY | Facility: CLINIC | Age: 67
End: 2022-03-17
Attending: EMERGENCY MEDICINE
Payer: COMMERCIAL

## 2022-03-17 VITALS
RESPIRATION RATE: 26 BRPM | TEMPERATURE: 98.2 F | BODY MASS INDEX: 27.23 KG/M2 | DIASTOLIC BLOOD PRESSURE: 91 MMHG | SYSTOLIC BLOOD PRESSURE: 136 MMHG | OXYGEN SATURATION: 98 % | HEIGHT: 62 IN | WEIGHT: 148 LBS | HEART RATE: 81 BPM

## 2022-03-17 DIAGNOSIS — J44.1 COPD EXACERBATION (H): ICD-10-CM

## 2022-03-17 LAB
FLUAV RNA SPEC QL NAA+PROBE: NEGATIVE
FLUBV RNA RESP QL NAA+PROBE: NEGATIVE
SARS-COV-2 RNA RESP QL NAA+PROBE: NEGATIVE

## 2022-03-17 PROCEDURE — 93005 ELECTROCARDIOGRAM TRACING: CPT

## 2022-03-17 PROCEDURE — 99284 EMERGENCY DEPT VISIT MOD MDM: CPT | Mod: 25 | Performed by: EMERGENCY MEDICINE

## 2022-03-17 PROCEDURE — 94640 AIRWAY INHALATION TREATMENT: CPT

## 2022-03-17 PROCEDURE — 250N000012 HC RX MED GY IP 250 OP 636 PS 637: Performed by: EMERGENCY MEDICINE

## 2022-03-17 PROCEDURE — 87636 SARSCOV2 & INF A&B AMP PRB: CPT | Performed by: EMERGENCY MEDICINE

## 2022-03-17 PROCEDURE — 99285 EMERGENCY DEPT VISIT HI MDM: CPT | Mod: 25

## 2022-03-17 PROCEDURE — 93010 ELECTROCARDIOGRAM REPORT: CPT | Performed by: EMERGENCY MEDICINE

## 2022-03-17 PROCEDURE — 71046 X-RAY EXAM CHEST 2 VIEWS: CPT

## 2022-03-17 PROCEDURE — 250N000009 HC RX 250: Performed by: EMERGENCY MEDICINE

## 2022-03-17 PROCEDURE — C9803 HOPD COVID-19 SPEC COLLECT: HCPCS

## 2022-03-17 RX ORDER — PREDNISONE 20 MG/1
40 TABLET ORAL ONCE
Status: COMPLETED | OUTPATIENT
Start: 2022-03-17 | End: 2022-03-17

## 2022-03-17 RX ORDER — PREDNISONE 10 MG/1
TABLET ORAL
Qty: 15 TABLET | Refills: 0 | Status: SHIPPED | OUTPATIENT
Start: 2022-03-17 | End: 2022-12-26

## 2022-03-17 RX ORDER — IPRATROPIUM BROMIDE AND ALBUTEROL SULFATE 2.5; .5 MG/3ML; MG/3ML
3 SOLUTION RESPIRATORY (INHALATION) ONCE
Status: COMPLETED | OUTPATIENT
Start: 2022-03-17 | End: 2022-03-17

## 2022-03-17 RX ADMIN — PREDNISONE 40 MG: 20 TABLET ORAL at 06:13

## 2022-03-17 RX ADMIN — IPRATROPIUM BROMIDE AND ALBUTEROL SULFATE 3 ML: 2.5; .5 SOLUTION RESPIRATORY (INHALATION) at 04:45

## 2022-03-17 ASSESSMENT — ENCOUNTER SYMPTOMS
NUMBNESS: 0
APPETITE CHANGE: 0
VOMITING: 0
HEADACHES: 0
NAUSEA: 1
MYALGIAS: 1
DIAPHORESIS: 0
LIGHT-HEADEDNESS: 0
ABDOMINAL PAIN: 0
COUGH: 1
BACK PAIN: 0
PALPITATIONS: 0
SORE THROAT: 0
CHILLS: 0
DYSPHORIC MOOD: 0
FATIGUE: 0
WEAKNESS: 0
SHORTNESS OF BREATH: 1
FEVER: 0
RHINORRHEA: 0
CHEST TIGHTNESS: 1

## 2022-03-17 NOTE — ED NOTES
EKG and nasal swabs obtained per MD orders. RT administering neb per MD orders. Awaiting CXR. Pt VS remain stable.

## 2022-03-17 NOTE — ED TRIAGE NOTES
"Pt present with increased SOB since yesterday. Pt with history of COPD. Pt states current inhalers and medications are not helping. Pt with complaints of a burning in her lungs. Pt states feels like I have \"bronchitis\". Pt just completed z-pack for possible sinus infection per pt report. Pt LS diminished throughout. Pt heart tones WNL. o2 sats stable on RA.   "

## 2022-03-17 NOTE — ED NOTES
Assisted pt to the bathroom. Pt reports breathing felt improved r/t the neb after walking to the bathroom and back. Pt endorsed having a nebulizer machine at home, but has had adverse side effects to previous nebs.

## 2022-03-17 NOTE — ED PROVIDER NOTES
History     Chief Complaint   Patient presents with     Shortness of Breath     HPI  Dasia Reynolds is a 66 year old female previous smoker with history of COPD presenting for evaluation of about 2 days of increasing dyspnea with chest tightness, occasional cough, difficulty breathing.  Also reports some mild intermittent body aches.  Denies fevers or chills.  Her occasional cough has been nonproductive.  Denies rhinorrhea or nasal congestion.  Denies sore throat.  No known sick contacts.  Did receive her Covid shots as well as influenza vaccine this year.  Has not had a pneumonia shot this year.  Patient reports she has been taking her prescribed COPD medications without much improvement in her symptoms.  Denies abdominal pain but has had some mild nausea.  Denies diaphoresis or chest pain.  Reports symptoms seem similar to her previous COPD exacerbations but more intense.    Allergies:  Allergies   Allergen Reactions     Fluticasone-Salmeterol Shortness Of Breath     Hypoxia       Augmentin Nausea and Vomiting     Vertigo     Doxycycline Headache and Nausea     Fumaric Acid Itching     Morphine Itching     Penicillins Other (See Comments)     vertigo      Sulfa Drugs Headache              Problem List:    Patient Active Problem List    Diagnosis Date Noted     Colon polyps 09/03/2019     Priority: Medium     Tubular adenoma and hyperplastic polyps       Pulmonary nodule 01/21/2015     Priority: Medium     COPD (chronic obstructive pulmonary disease) (H) 02/27/2013     Priority: Medium     Elevated cholesterol 11/27/2012     Priority: Medium     Advanced directives, counseling/discussion 05/04/2012     Priority: Medium     Discussed advance care planning with patient; information given to patient to review. 5/4/2012          Gastric erosion 10/16/2008     Priority: Medium     Eczema 10/11/2007     Priority: Medium     Meniere's disease 09/07/2007     Priority: Medium     Anxiety 09/07/2007     Priority: Medium      Torticollis 2007     Priority: Medium        Past Medical History:    Past Medical History:   Diagnosis Date     COPD (chronic obstructive pulmonary disease) (H)      Meniere's disease        Past Surgical History:    Past Surgical History:   Procedure Laterality Date     CHOLECYSTECTOMY       COLONOSCOPY N/A 2019    Procedure: COLONOSCOPY, WITH POLYPECTOMY AND BIOPSY;  Surgeon: Narciso Singh DO;  Location: [place] [facility]       Family History:    Family History   Problem Relation Age of Onset     Respiratory Father         emphysema     Alzheimer Disease Maternal Grandmother      Cancer Maternal Grandfather         pancreatic cancer     Cancer Paternal Grandfather         lung     Gastrointestinal Disease Sister         diverticulis     Alzheimer Disease Mother        Social History:  Marital Status:  Single [1]  Social History     Tobacco Use     Smoking status: Former Smoker     Packs/day: 0.50     Years: 40.00     Pack years: 20.00     Types: Cigarettes     Quit date: 2012     Years since quittin.3     Smokeless tobacco: Never Used   Substance Use Topics     Alcohol use: Yes     Comment: very little     Drug use: No        Medications:    predniSONE (DELTASONE) 10 MG tablet  albuterol (PROVENTIL HFA: VENTOLIN HFA) 108 (90 BASE) MCG/ACT inhaler  Ascorbic Acid (VITAMIN C PO)  diclofenac (VOLTAREN) 1 % topical gel  Glucosamine HCl (GLUCOSAMINE PO)  ibuprofen (ADVIL/MOTRIN) 200 MG tablet  LORazepam (ATIVAN) 1 MG tablet  meclizine (ANTIVERT) 25 MG tablet  Multiple Vitamin (MULTI-VITAMIN) per tablet  probiotic CAPS  tiotropium (SPIRIVA HANDIHALER) 18 MCG inhaled capsule          Review of Systems   Constitutional: Negative for appetite change, chills, diaphoresis, fatigue and fever.   HENT: Negative for congestion, postnasal drip, rhinorrhea and sore throat.    Respiratory: Positive for cough (Occasional), chest tightness and shortness of breath.    Cardiovascular: Negative for chest pain,  "palpitations and leg swelling.   Gastrointestinal: Positive for nausea. Negative for abdominal pain and vomiting.   Genitourinary: Negative for decreased urine volume.   Musculoskeletal: Positive for myalgias (Intermittent, mild). Negative for back pain.   Skin: Negative for rash.   Neurological: Negative for weakness, light-headedness, numbness and headaches.   Psychiatric/Behavioral: Negative for dysphoric mood.   All other systems reviewed and are negative.      Physical Exam   BP: (!) 159/97  Pulse: 74  Temp: 98.2  F (36.8  C)  Resp: 18  Height: 157.5 cm (5' 2\")  Weight: 67.1 kg (148 lb)  SpO2: 97 %      Physical Exam  Vitals and nursing note reviewed.   Constitutional:       Appearance: She is well-developed. She is not ill-appearing or diaphoretic.      Comments: Awake and alert, talking in full sentences.  Appears in no distress.  Able to easily provide history   HENT:      Mouth/Throat:      Mouth: Mucous membranes are moist.   Eyes:      Conjunctiva/sclera: Conjunctivae normal.   Cardiovascular:      Rate and Rhythm: Normal rate and regular rhythm.      Pulses: Normal pulses.   Pulmonary:      Effort: Pulmonary effort is normal. No accessory muscle usage.      Breath sounds: No stridor. Examination of the right-upper field reveals decreased breath sounds. Examination of the left-upper field reveals decreased breath sounds. Examination of the left-middle field reveals decreased breath sounds. Decreased breath sounds present. No wheezing or rhonchi.   Chest:      Chest wall: No tenderness.   Abdominal:      Palpations: Abdomen is soft.   Musculoskeletal:      Cervical back: Normal range of motion.      Right lower leg: No tenderness. No edema.      Left lower leg: No tenderness. No edema.   Skin:     General: Skin is warm and dry.      Capillary Refill: Capillary refill takes less than 2 seconds.   Neurological:      Mental Status: She is alert and oriented to person, place, and time.   Psychiatric:         " Mood and Affect: Mood normal.         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  No acute ischemic abnormality                  Results for orders placed or performed during the hospital encounter of 03/17/22 (from the past 24 hour(s))   Symptomatic; Yes; 3/16/2022 Influenza A/B & SARS-CoV2 (COVID-19) Virus PCR Multiplex Nose    Specimen: Nose; Swab   Result Value Ref Range    Influenza A PCR Negative Negative    Influenza B PCR Negative Negative    SARS CoV2 PCR Negative Negative    Narrative    Testing was performed using the penny SARS-CoV-2 & Influenza A/B Assay on the penny [employer] System. This test should be ordered for the detection of SARS-CoV-2 and influenza viruses in individuals who meet clinical and/or epidemiological criteria. Test performance is unknown in asymptomatic patients. This test is for in vitro diagnostic use under the FDA EUA for laboratories certified under CLIA to perform moderate and/or high complexity testing. This test has not been FDA cleared or approved. A negative result does not rule out the presence of PCR inhibitors in the specimen or target RNA in concentration below the limit of detection for the assay. If only one viral target is positive but coinfection with multiple targets is suspected, the sample should be re-tested with another FDA cleared, approved or authorized test, if coinfection would change clinical management. [facility] are certified under the Clinical Laboratory Improvement Amendments of 1988 (CLIA-88) as  qualified to perform moderate  and/or high complexity laboratory testing.   XR Chest 2 Views    Narrative    EXAM: XR CHEST 2 VW  LOCATION: [facility]  DATE/TIME: 3/17/2022 5:40 AM    INDICATION: Cough. Dyspnea.  COMPARISON: 12/1/2020.    FINDINGS: The heart size is normal. There is scarring in the lung bases and right suprahilar region. The lungs are otherwise clear. There is no pneumothorax or pleural effusion. Degenerative disease in the spine.      Impression    IMPRESSION: No acute abnormality.       Medications   predniSONE (DELTASONE) tablet 40 mg (has no administration in time range)   ipratropium - albuterol 0.5 mg/2.5 mg/3 mL (DUONEB) neb solution 3 mL (3 mLs Nebulization Given 3/17/22 5522)       Assessments & Plan (with Medical Decision Making)  66-year-old female previous smoker with history of COPD presenting for evaluation of 2 days of chest tightness with increasing dyspnea with occasional cough.  Patient reports her medications at home have not provided much relief.  No fevers or chills or known sick contacts.  Screening influenza and Covid test negative.  Lung sounds somewhat diminished on the left and x-ray obtained evaluate for pneumonia.  Given a DuoNeb here subjective improvement in her breathing.  X-ray without signs of pneumonia.  Given the absence of any fevers or productive cough nor any evidence of pneumonia on x-ray, recommended against antibiotics.  For her presumed COPD flare.  A started on a course of prednisone dvised patient to follow-up with her pulmonologist and return if symptoms worsen.     I have reviewed the nursing notes.    I have reviewed the findings, diagnosis, plan and need for follow up with the patient.       New Prescriptions    PREDNISONE (DELTASONE) 10 MG TABLET    Take three tablets (= 30mg) each day for 5 (five) days       Final diagnoses:   COPD exacerbation (H)       3/17/2022   [facility] EMERGENCY DEPT     Samson, Vamshi Duran MD  03/17/22  0601

## 2022-03-18 ENCOUNTER — PATIENT OUTREACH (OUTPATIENT)
Dept: CARE COORDINATION | Facility: CLINIC | Age: 67
End: 2022-03-18
Payer: COMMERCIAL

## 2022-03-18 DIAGNOSIS — Z71.89 OTHER SPECIFIED COUNSELING: ICD-10-CM

## 2022-03-18 NOTE — PROGRESS NOTES
Clinic Care Coordination Contact  [facility]: Post-Discharge Note  SITUATION                                                      Admission:    Admission Date: 03/17/22   Reason for Admission: Shortness of Breath  Discharge:   Discharge Date: 03/17/22  Discharge Diagnosis: Shortness of Breath    BACKGROUND                                                      Per hospital discharge summary and inpatient provider notes:  66 year old female previous smoker with history of COPD presenting for evaluation of about 2 days of increasing dyspnea with chest tightness, occasional cough, difficulty breathing.  Also reports some mild intermittent body aches.  Denies fevers or chills.  Her occasional cough has been nonproductive.  Denies rhinorrhea or nasal congestion.  Denies sore throat.  No known sick contacts.  Did receive her Covid shots as well as influenza vaccine this year.  Has not had a pneumonia shot this year.  Patient reports she has been taking her prescribed COPD medications without much improvement in her symptoms.  Denies abdominal pain but has had some mild nausea.  Denies diaphoresis or chest pain.  Reports symptoms seem similar to her previous COPD exacerbations but more intense.       ASSESSMENT      Enrollment  Primary Care Care Coordination Status: Not a Candidate    Discharge Assessment  How are you doing now that you are home?: coughing alot of stuff  How are your symptoms? (Red Flag symptoms escalate to triage hotline per guidelines): Improved  Do you feel your condition is stable enough to be safe at home until your provider visit?: Yes  Does the patient have their discharge instructions? : Yes  Does the patient have questions regarding their discharge instructions? : No  Were you started on any new medications or were there changes to any of your previous medications? : Yes  Does the patient have all of their medications?: Yes  Do you have questions regarding any of your medications? : No  Do  you have all of your needed medical supplies or equipment (DME)?  (i.e. oxygen tank, CPAP, cane, etc.): Yes  Discharge follow-up appointment scheduled within 14 calendar days? : No  Is patient agreeable to assistance with scheduling? : No (her PCP is out of town until monday and she will call the office then, CTA encouraged her to be sure to call asap)                PLAN                                                      Outpatient Plan:  Schedule an appointment with CELSO QUEZADA  as soon as possible for a visit    No future appointments.      For any urgent concerns, please contact our 24 hour nurse triage line: [phone] ([phone])         Allie Estrella MA

## 2022-03-20 ENCOUNTER — HOSPITAL ENCOUNTER (EMERGENCY)
Facility: CLINIC | Age: 67
Discharge: HOME | End: 2022-03-20
Attending: FAMILY MEDICINE | Admitting: FAMILY MEDICINE
Payer: COMMERCIAL

## 2022-03-20 VITALS
BODY MASS INDEX: 27.25 KG/M2 | OXYGEN SATURATION: 97 % | DIASTOLIC BLOOD PRESSURE: 93 MMHG | SYSTOLIC BLOOD PRESSURE: 151 MMHG | RESPIRATION RATE: 20 BRPM | HEART RATE: 84 BPM | TEMPERATURE: 98.3 F | WEIGHT: 149 LBS

## 2022-03-20 DIAGNOSIS — J44.1 ACUTE EXACERBATION OF CHRONIC OBSTRUCTIVE PULMONARY DISEASE (COPD) (H): ICD-10-CM

## 2022-03-20 PROCEDURE — 99282 EMERGENCY DEPT VISIT SF MDM: CPT | Performed by: FAMILY MEDICINE

## 2022-03-20 PROCEDURE — 99284 EMERGENCY DEPT VISIT MOD MDM: CPT | Performed by: FAMILY MEDICINE

## 2022-03-20 RX ORDER — AZITHROMYCIN 250 MG/1
TABLET, FILM COATED ORAL
Qty: 6 TABLET | Refills: 0 | Status: SHIPPED | OUTPATIENT
Start: 2022-03-20 | End: 2022-03-25

## 2022-03-20 NOTE — DISCHARGE INSTRUCTIONS
Take Z-Macho as directed.  Use your Qvar inhaler twice daily for 2 weeks then return to as needed use.  Be seen if you have worsening difficulty breathing, fevers, chest pain, or other worrisome symptoms.

## 2022-03-20 NOTE — ED PROVIDER NOTES
History     Chief Complaint   Patient presents with     Cough     increased     Shortness of Breath     COPD related better than Thursday     HPI  Dasia Reynolds is a 66 year old female who comes in with persistent cough and dyspnea in the setting of COPD.  She was seen here 3 days ago with the symptoms.  She was prescribed prednisone.  She normally uses a Qvar inhaler as needed but not on a sustained basis.  She has an albuterol inhaler.  Influenza and Covid testing was -3 days ago.  She is not having fevers.  She did have sinus drainage.  She describes burning in her chest at the onset 3 days ago.  She does not currently smoke.  She quit 10 years ago.  Her sputum production has become purulent.    Allergies:  Allergies   Allergen Reactions     Fluticasone-Salmeterol Shortness Of Breath     Hypoxia       Augmentin Nausea and Vomiting     Vertigo     Doxycycline Headache and Nausea     Fumaric Acid Itching     Morphine Itching     Penicillins Other (See Comments)     vertigo      Sulfa Drugs Headache              Problem List:    Patient Active Problem List    Diagnosis Date Noted     Colon polyps 09/03/2019     Priority: Medium     Tubular adenoma and hyperplastic polyps       Pulmonary nodule 01/21/2015     Priority: Medium     COPD (chronic obstructive pulmonary disease) (H) 02/27/2013     Priority: Medium     Elevated cholesterol 11/27/2012     Priority: Medium     Advanced directives, counseling/discussion 05/04/2012     Priority: Medium     Discussed advance care planning with patient; information given to patient to review. 5/4/2012          Gastric erosion 10/16/2008     Priority: Medium     Eczema 10/11/2007     Priority: Medium     Meniere's disease 09/07/2007     Priority: Medium     Anxiety 09/07/2007     Priority: Medium     Torticollis 09/07/2007     Priority: Medium        Past Medical History:    Past Medical History:   Diagnosis Date     COPD (chronic obstructive pulmonary disease) (H)       Meniere's disease        Past Surgical History:    Past Surgical History:   Procedure Laterality Date     CHOLECYSTECTOMY       COLONOSCOPY N/A 2019    Procedure: COLONOSCOPY, WITH POLYPECTOMY AND BIOPSY;  Surgeon: Narciso Singh DO;  Location: [facility]       Family History:    Family History   Problem Relation Age of Onset     Respiratory Father         emphysema     Alzheimer Disease Maternal Grandmother      Cancer Maternal Grandfather         pancreatic cancer     Cancer Paternal Grandfather         lung     Gastrointestinal Disease Sister         diverticulis     Alzheimer Disease Mother        Social History:  Marital Status:  Single [1]  Social History     Tobacco Use     Smoking status: Former Smoker     Packs/day: 0.50     Years: 40.00     Pack years: 20.00     Types: Cigarettes     Quit date: 2012     Years since quittin.3     Smokeless tobacco: Never Used   Substance Use Topics     Alcohol use: Yes     Comment: very little     Drug use: No        Medications:    azithromycin (ZITHROMAX Z-SUNNI) 250 MG tablet  albuterol (PROVENTIL HFA: VENTOLIN HFA) 108 (90 BASE) MCG/ACT inhaler  Ascorbic Acid (VITAMIN C PO)  diclofenac (VOLTAREN) 1 % topical gel  Glucosamine HCl (GLUCOSAMINE PO)  ibuprofen (ADVIL/MOTRIN) 200 MG tablet  LORazepam (ATIVAN) 1 MG tablet  meclizine (ANTIVERT) 25 MG tablet  Multiple Vitamin (MULTI-VITAMIN) per tablet  predniSONE (DELTASONE) 10 MG tablet  probiotic CAPS  tiotropium (SPIRIVA HANDIHALER) 18 MCG inhaled capsule          Review of Systems    Further problem focused system review negative.    Physical Exam   BP: (!) 151/93  Pulse: 84  Temp: 98.3  F (36.8  C)  Resp: 20  Weight: 67.6 kg (149 lb)  SpO2: 97 %      Physical Exam  Nursing note and vitals were reviewed.  Constitutional: Awake and alert, adequately nourished and developed appearing 66-year-old in no apparent discomfort, who does not appear acutely ill, and who answers questions appropriately and  cooperates with examination.  HEENT: EOMI.   Cardiovascular: Cardiac examination reveals normal heart rate and regular rhythm without murmur.  Pulmonary/Chest: Breathing is unlabored.  Breath sounds are clear and equal bilaterally.  There no retractions, tachypnea, rales, wheezes, or rhonchi.  There is moderate prolongation of the expiratory phase on forced expiration.  Neurological: Alert, oriented, thought content logical, coherent   Psychiatric: Affect broad and appropriate.      ED Course                 Procedures              Critical Care time:  none               No results found for this or any previous visit (from the past 24 hour(s)).    Medications - No data to display    Assessments & Plan (with Medical Decision Making)     66-year-old female with COPD presents with an exacerbation of dyspnea with purulent sputum production currently on a course of prednisone.  Lung examination is reassuring with normal O2 sats and no adventitious sounds except on forced expiration.  With the change to purulent sputum production will initiate antibiotic therapy.  She says that azithromycin is the antibiotic that works best for her and others are not tolerated.  Recommended she resume Qvar twice daily for a couple of weeks until this exacerbation settles down.  There may be a component of allergies since she has some spring allergies and she could use some cetirizine 5 to 10 mg once or twice daily in addition for allergy type symptoms.    I have reviewed the nursing notes.    I have reviewed the findings, diagnosis, plan and need for follow up with the patient.       New Prescriptions    AZITHROMYCIN (ZITHROMAX Z-SUNNI) 250 MG TABLET    Two tablets on the first day, then one tablet daily for the next 4 days       Final diagnoses:   Acute exacerbation of chronic obstructive pulmonary disease (COPD) (H)       3/20/2022   [facility] EMERGENCY DEPT     Jakob Soto MD  03/20/22 1038

## 2022-03-20 NOTE — LETTER
March 20, 2022      To Whom It May Concern:      Dasia Reynolds was seen in our Emergency Department today, 03/20/22.  Please excuse her from work March 21-22, 2022, due to an acute medical problem.    Sincerely,        Jakob Soto MD

## 2022-03-20 NOTE — ED NOTES
Here today due to change in symptoms since was just here for COPD exacerbation and was put on prednisone, now cough has loosened up and is thick phlegm, C/O sore throat and ear pain, has noted crackling in the lungs also

## 2022-03-20 NOTE — ED TRIAGE NOTES
Pt here with increased cough and sputum pt reports feeling like she is choking on her sputum at times. Pt cannot sleep because she can't stop coughing. Hx of COPD SOB a little improved since starting prednisone last Thursday. Pt did not take her prednisone today because she doesn't like the side effects. Negative Covid test Thursday in ED.

## 2022-05-31 ENCOUNTER — HOSPITAL ENCOUNTER (OUTPATIENT)
Dept: MRI IMAGING | Facility: CLINIC | Age: 67
Discharge: HOME | End: 2022-05-31
Attending: STUDENT IN AN ORGANIZED HEALTH CARE EDUCATION/TRAINING PROGRAM | Admitting: STUDENT IN AN ORGANIZED HEALTH CARE EDUCATION/TRAINING PROGRAM
Payer: COMMERCIAL

## 2022-05-31 DIAGNOSIS — M54.2 NECK PAIN: ICD-10-CM

## 2022-05-31 PROCEDURE — 72141 MRI NECK SPINE W/O DYE: CPT

## 2022-06-04 ENCOUNTER — HEALTH MAINTENANCE LETTER (OUTPATIENT)
Age: 67
End: 2022-06-04

## 2022-07-11 ENCOUNTER — HOSPITAL ENCOUNTER (OUTPATIENT)
Dept: MAMMOGRAPHY | Facility: CLINIC | Age: 67
Discharge: HOME | End: 2022-07-11
Attending: FAMILY MEDICINE | Admitting: FAMILY MEDICINE
Payer: COMMERCIAL

## 2022-07-11 DIAGNOSIS — Z12.31 VISIT FOR SCREENING MAMMOGRAM: ICD-10-CM

## 2022-07-11 PROCEDURE — 77067 SCR MAMMO BI INCL CAD: CPT

## 2022-09-10 NOTE — ED TRIAGE NOTES
Pt states her right knee buckled while walking yesterday. Pt fell onto right elbow. Patient reports she has severe pain in right elbow. Pt has been icing it, wrapping it and taking tylenol. Pt states she has had prior fx in forearm. Pt states she cannot straighten arm out. Radial pulse WNL.   
History/Exam/Medical Decision Making

## 2022-10-10 ENCOUNTER — HEALTH MAINTENANCE LETTER (OUTPATIENT)
Age: 67
End: 2022-10-10

## 2022-12-25 ENCOUNTER — HOSPITAL ENCOUNTER (EMERGENCY)
Facility: CLINIC | Age: 67
Discharge: HOME | End: 2022-12-26
Attending: EMERGENCY MEDICINE | Admitting: EMERGENCY MEDICINE
Payer: COMMERCIAL

## 2022-12-25 DIAGNOSIS — J06.9 VIRAL URI: ICD-10-CM

## 2022-12-25 DIAGNOSIS — R05.1 ACUTE COUGH: ICD-10-CM

## 2022-12-25 PROCEDURE — 99284 EMERGENCY DEPT VISIT MOD MDM: CPT | Mod: CS,25 | Performed by: EMERGENCY MEDICINE

## 2022-12-25 PROCEDURE — C9803 HOPD COVID-19 SPEC COLLECT: HCPCS | Performed by: EMERGENCY MEDICINE

## 2022-12-25 PROCEDURE — 99284 EMERGENCY DEPT VISIT MOD MDM: CPT | Mod: CS | Performed by: EMERGENCY MEDICINE

## 2022-12-25 NOTE — Clinical Note
Dasia Orozco was seen and treated in our emergency department on 12/25/2022.  She may return to work on 12/29/2022.       If you have any questions or concerns, please don't hesitate to call.      Thanh Vyas MD

## 2022-12-26 ENCOUNTER — APPOINTMENT (OUTPATIENT)
Dept: GENERAL RADIOLOGY | Facility: CLINIC | Age: 67
End: 2022-12-26
Attending: EMERGENCY MEDICINE
Payer: COMMERCIAL

## 2022-12-26 VITALS
WEIGHT: 134 LBS | DIASTOLIC BLOOD PRESSURE: 96 MMHG | OXYGEN SATURATION: 94 % | BODY MASS INDEX: 24.66 KG/M2 | TEMPERATURE: 98.8 F | RESPIRATION RATE: 20 BRPM | SYSTOLIC BLOOD PRESSURE: 169 MMHG | HEART RATE: 90 BPM | HEIGHT: 62 IN

## 2022-12-26 PROCEDURE — 250N000012 HC RX MED GY IP 250 OP 636 PS 637: Performed by: EMERGENCY MEDICINE

## 2022-12-26 PROCEDURE — 71045 X-RAY EXAM CHEST 1 VIEW: CPT

## 2022-12-26 PROCEDURE — 87636 SARSCOV2 & INF A&B AMP PRB: CPT | Performed by: EMERGENCY MEDICINE

## 2022-12-26 RX ORDER — PREDNISONE 20 MG/1
40 TABLET ORAL ONCE
Status: COMPLETED | OUTPATIENT
Start: 2022-12-26 | End: 2022-12-26

## 2022-12-26 RX ORDER — PREDNISONE 20 MG/1
TABLET ORAL
Qty: 10 TABLET | Refills: 0 | Status: SHIPPED | OUTPATIENT
Start: 2022-12-26

## 2022-12-26 RX ADMIN — PREDNISONE 40 MG: 20 TABLET ORAL at 00:42

## 2022-12-26 ASSESSMENT — ENCOUNTER SYMPTOMS
FEVER: 0
SHORTNESS OF BREATH: 0
CHILLS: 1
ABDOMINAL PAIN: 0
COUGH: 1

## 2022-12-26 ASSESSMENT — ACTIVITIES OF DAILY LIVING (ADL): ADLS_ACUITY_SCORE: 35

## 2022-12-26 NOTE — ED PROVIDER NOTES
History     Chief Complaint   Patient presents with     Cough     HPI  Dasia Orozco is a 67 year old female who has past medical history significant for COPD, presenting emergency department with concerns regarding cough, congestion, and shortness of breath.  Symptoms present over the past approximately 2 days.   also ill with similar types of symptoms.  No recent travel.  Has been using inhalers and nebulizers without significant relief.  No travel.  No lower extremity swelling.  No prior history of blood clots.  Has been feeling chilled at home.  Has not taken her temperature.    Allergies:  Allergies   Allergen Reactions     Fluticasone-Salmeterol Shortness Of Breath     Hypoxia       Augmentin Nausea and Vomiting     Vertigo     Doxycycline Headache and Nausea     Fumaric Acid Itching     Morphine Itching     Penicillins Other (See Comments)     vertigo      Sulfa Drugs Headache              Problem List:    Patient Active Problem List    Diagnosis Date Noted     Colon polyps 09/03/2019     Priority: Medium     Tubular adenoma and hyperplastic polyps       Pulmonary nodule 01/21/2015     Priority: Medium     COPD (chronic obstructive pulmonary disease) (H) 02/27/2013     Priority: Medium     Elevated cholesterol 11/27/2012     Priority: Medium     Advanced directives, counseling/discussion 05/04/2012     Priority: Medium     Discussed advance care planning with patient; information given to patient to review. 5/4/2012          Gastric erosion 10/16/2008     Priority: Medium     Eczema 10/11/2007     Priority: Medium     Meniere's disease 09/07/2007     Priority: Medium     Anxiety 09/07/2007     Priority: Medium     Torticollis 09/07/2007     Priority: Medium        Past Medical History:    Past Medical History:   Diagnosis Date     COPD (chronic obstructive pulmonary disease) (H)      Meniere's disease        Past Surgical History:    Past Surgical History:   Procedure Laterality Date      "CHOLECYSTECTOMY  01/01/2008     COLONOSCOPY N/A 08/16/2019    Procedure: COLONOSCOPY, WITH POLYPECTOMY AND BIOPSY;  Surgeon: Narciso Singh DO;  Location: [facility]       Family History:    Family History   Problem Relation Age of Onset     Respiratory Father         emphysema     Alzheimer Disease Maternal Grandmother      Cancer Maternal Grandfather         pancreatic cancer     Cancer Paternal Grandfather         lung     Gastrointestinal Disease Sister         diverticulis     Alzheimer Disease Mother        Social History:  Marital Status:  Single [1]  Social History     Tobacco Use     Smoking status: Former     Packs/day: 0.50     Years: 40.00     Pack years: 20.00     Types: Cigarettes     Quit date: 11/18/2012     Years since quitting: 10.1     Smokeless tobacco: Never   Substance Use Topics     Alcohol use: Yes     Comment: very little     Drug use: No        Medications:    predniSONE (DELTASONE) 20 MG tablet  albuterol (PROVENTIL HFA: VENTOLIN HFA) 108 (90 BASE) MCG/ACT inhaler  Ascorbic Acid (VITAMIN C PO)  diclofenac (VOLTAREN) 1 % topical gel  Glucosamine HCl (GLUCOSAMINE PO)  ibuprofen (ADVIL/MOTRIN) 200 MG tablet  LORazepam (ATIVAN) 1 MG tablet  meclizine (ANTIVERT) 25 MG tablet  Multiple Vitamin (MULTI-VITAMIN) per tablet  probiotic CAPS  tiotropium (SPIRIVA HANDIHALER) 18 MCG inhaled capsule          Review of Systems   Constitutional: Positive for chills. Negative for fever.   HENT: Positive for congestion.    Respiratory: Positive for cough. Negative for shortness of breath.    Cardiovascular: Negative for chest pain.   Gastrointestinal: Negative for abdominal pain.   All other systems reviewed and are negative.      Physical Exam   BP: (!) 169/96  Pulse: 90  Temp: 98.8  F (37.1  C)  Resp: 20  Height: 157.5 cm (5' 2\")  Weight: 60.8 kg (134 lb)  SpO2: 94 %      Physical Exam  BP (!) 169/96   Pulse 90   Temp 98.8  F (37.1  C) (Oral)   Resp 20   Ht 1.575 m (5' 2\")   Wt 60.8 kg (134 lb)  "  LMP 09/11/2001   SpO2 94%   BMI 24.51 kg/m    General: alert and in no acute distress  Head: atraumatic, normocephalic  Abd: nondistended  Lungs:  CTA bilaterally.  No w/r/r  Musculoskel/Extremities: normal extremities, no apparent edema, and full AROM of major joints  Skin: no rashes, no diaphoresis and skin color normal  Neuro: Patient awake, alert, oriented, speech is fluent, gait is normal  Psychiatric: affect/mood normal, cooperative, normal judgement/insight and memory intact      ED Course                 Procedures              Critical Care time:  none               Results for orders placed or performed during the hospital encounter of 12/25/22 (from the past 24 hour(s))   Symptomatic Influenza A/B & SARS-CoV2 (COVID-19) Virus PCR Multiplex Nose    Specimen: Nose; Swab   Result Value Ref Range    Influenza A PCR Negative Negative    Influenza B PCR Negative Negative    SARS CoV2 PCR Negative Negative    Narrative    Testing was performed using the penny SARS-CoV-2 & Influenza A/B Assay on the penny Radha System. This test should be ordered for the detection of SARS-CoV-2 and influenza viruses in individuals who meet clinical and/or epidemiological criteria. Test performance is unknown in asymptomatic patients. This test is for in vitro diagnostic use under the FDA EUA for laboratories certified under CLIA to perform moderate and/or high complexity testing. This test has not been FDA cleared or approved. A negative result does not rule out the presence of PCR inhibitors in the specimen or target RNA in concentration below the limit of detection for the assay. If only one viral target is positive but coinfection with multiple targets is suspected, the sample should be re-tested with another FDA cleared, approved or authorized test, if coinfection would change clinical management. [facility] are certified under the Clinical Laboratory Improvement Amendments of 1988 (CLIA-88) as  qualified to perform moderate and/or high complexity laboratory testing.   XR Chest Port 1 View    Narrative    EXAM: XR CHEST PORT 1 VIEW  LOCATION: [facility]  DATE/TIME: 12/26/2022 12:51 AM    INDICATION: cough.  congestion.  fever  COMPARISON: 03/17/2022      Impression    IMPRESSION: Bibasilar linear opacities likely reflect atelectasis. There is no focal consolidation, large pleural effusion or pneumothorax. Similar appearance of the cardiomediastinal silhouette.       Medications   predniSONE (DELTASONE) tablet 40 mg (40 mg Oral Given 12/26/22 0042)       Assessments & Plan (with Medical Decision Making)  67 year old female presenting the emergency department with approximately 2-day history of increased congestion, cough, feeling chilled at home.  Slightly hypertensive upon arrival.  Normal oxygen saturation.  No significant increased work of breathing.  Does have increased congestion, with cough, nonproductive, and feels as if she is unable to eat get productive sputum.  Chest x-ray, reviewed by myself shows no evidence of lobar infiltrate, and no signs of pneumonia.  COVID and influenza testing are negative.  Likely viral etiology of symptoms.  Given prednisone during ED course.  Will discharge home with instructions on continued bronchodilators, with prednisone which was prescribed.     I have reviewed the nursing notes.    I have reviewed the findings, diagnosis, plan and need for follow up with the patient.       New Prescriptions    PREDNISONE (DELTASONE) 20 MG TABLET    Take two tablets (= 40mg) each day for 5 (five) days       Final diagnoses:   Acute cough   Viral URI       12/25/2022   [facility] EMERGENCY DEPT     Thanh Vyas MD  12/26/22 0128

## 2022-12-26 NOTE — ED TRIAGE NOTES
Cough and chest congestion       Triage Assessment     Row Name 12/26/22 0000       Triage Assessment (Adult)    Airway WDL WDL       Respiratory WDL    Respiratory WDL cough       Cardiac WDL    Cardiac WDL WDL       Peripheral/Neurovascular WDL    Peripheral Neurovascular WDL WDL       Cognitive/Neuro/Behavioral WDL    Cognitive/Neuro/Behavioral WDL WDL

## 2023-05-03 ENCOUNTER — HOSPITAL ENCOUNTER (OUTPATIENT)
Dept: CT IMAGING | Facility: CLINIC | Age: 68
Discharge: HOME | End: 2023-05-03
Attending: FAMILY MEDICINE | Admitting: FAMILY MEDICINE
Payer: COMMERCIAL

## 2023-05-03 DIAGNOSIS — Z87.891 PERSONAL HISTORY OF TOBACCO USE: ICD-10-CM

## 2023-05-03 DIAGNOSIS — R91.1 PULMONARY NODULE: ICD-10-CM

## 2023-05-03 PROCEDURE — 71271 CT THORAX LUNG CANCER SCR C-: CPT

## 2023-06-10 ENCOUNTER — HEALTH MAINTENANCE LETTER (OUTPATIENT)
Age: 68
End: 2023-06-10

## 2023-07-14 ENCOUNTER — HOSPITAL ENCOUNTER (OUTPATIENT)
Dept: MAMMOGRAPHY | Facility: CLINIC | Age: 68
Discharge: HOME | End: 2023-07-14
Attending: FAMILY MEDICINE | Admitting: FAMILY MEDICINE
Payer: COMMERCIAL

## 2023-07-14 DIAGNOSIS — Z12.31 SCREENING MAMMOGRAM, ENCOUNTER FOR: ICD-10-CM

## 2023-07-14 PROCEDURE — 77067 SCR MAMMO BI INCL CAD: CPT

## 2023-07-31 ENCOUNTER — MEDICAL CORRESPONDENCE (OUTPATIENT)
Dept: HEALTH INFORMATION MANAGEMENT | Facility: CLINIC | Age: 68
End: 2023-07-31
Payer: COMMERCIAL

## 2023-08-03 ENCOUNTER — TRANSCRIBE ORDERS (OUTPATIENT)
Dept: OTHER | Age: 68
End: 2023-08-03

## 2023-08-03 DIAGNOSIS — M43.6 TORTICOLLIS: Primary | ICD-10-CM

## 2023-11-30 NOTE — PROGRESS NOTES
Outpatient Physical Therapy Discharge Note     Patient: Dasia Reynolds  : 1955    Beginning/End Dates of Reporting Period:  3/22/21 to 3/22/21    Referring Provider: saad Smith Diagnosis: OA of R Knee      Client Self Report: Pain near Pin medial side of knee. Pain Scale: 5-10/10     Objective Measurements:  Objective Measure: LEFS   Details: 45/80     Objective Measure: Flexibility  Details: B Hip Flex tight, Quads Tight, B Soleus, R HS's tighter than L    Objective Measure: Spec tests   Details: Pronated R>L foot, Incomplete extension R during ambulation, Knee Ext strength R 5-.      Goals:  Goal Identifier 1   Goal Description STG: Pt will be able to walk 2 blocks w/ minimal difficulty per LEFS.    Target Date 21   Date Met      Progress (detail required for progress note):       Goal Identifier 2   Goal Description STG: Pt will be able to get in/out of car w/ minimal difficulty per LEFS.    Target Date 21   Date Met      Progress (detail required for progress note):       Goal Identifier 3   Goal Description LTG: Pt will be independent w/HEP to address Mm imbalances, Strength.    Target Date 21   Date Met      Progress (detail required for progress note):       Plan:  Discharge from therapy.    Discharge:    Reason for Discharge: Patient has failed to schedule further appointments.    Equipment Issued:      Discharge Plan: Patient to continue home program. Pt to follow up w/MD as appropriate.   Saniya Uribe, PT, MTC (#[id number])  [facility]           [phone]  Fax          [phone]  Appt #      [phone]     36.4 elevating leg rests

## 2024-03-11 ENCOUNTER — HOSPITAL ENCOUNTER (EMERGENCY)
Facility: CLINIC | Age: 69
Discharge: HOME | End: 2024-03-11
Attending: FAMILY MEDICINE | Admitting: FAMILY MEDICINE
Payer: COMMERCIAL

## 2024-03-11 VITALS
WEIGHT: 128 LBS | SYSTOLIC BLOOD PRESSURE: 176 MMHG | HEART RATE: 92 BPM | BODY MASS INDEX: 23.55 KG/M2 | TEMPERATURE: 96.8 F | OXYGEN SATURATION: 93 % | DIASTOLIC BLOOD PRESSURE: 94 MMHG | RESPIRATION RATE: 14 BRPM | HEIGHT: 62 IN

## 2024-03-11 DIAGNOSIS — M54.2 NECK PAIN: ICD-10-CM

## 2024-03-11 PROCEDURE — 96375 TX/PRO/DX INJ NEW DRUG ADDON: CPT

## 2024-03-11 PROCEDURE — 99284 EMERGENCY DEPT VISIT MOD MDM: CPT | Performed by: FAMILY MEDICINE

## 2024-03-11 PROCEDURE — 96374 THER/PROPH/DIAG INJ IV PUSH: CPT

## 2024-03-11 PROCEDURE — 99284 EMERGENCY DEPT VISIT MOD MDM: CPT | Mod: 25

## 2024-03-11 PROCEDURE — 250N000011 HC RX IP 250 OP 636: Performed by: FAMILY MEDICINE

## 2024-03-11 RX ORDER — KETOROLAC TROMETHAMINE 15 MG/ML
10 INJECTION, SOLUTION INTRAMUSCULAR; INTRAVENOUS ONCE
Status: COMPLETED | OUTPATIENT
Start: 2024-03-11 | End: 2024-03-11

## 2024-03-11 RX ORDER — HYDROMORPHONE HYDROCHLORIDE 1 MG/ML
0.5 INJECTION, SOLUTION INTRAMUSCULAR; INTRAVENOUS; SUBCUTANEOUS EVERY 30 MIN PRN
Status: DISCONTINUED | OUTPATIENT
Start: 2024-03-11 | End: 2024-03-11 | Stop reason: HOSPADM

## 2024-03-11 RX ORDER — ONDANSETRON 2 MG/ML
4 INJECTION INTRAMUSCULAR; INTRAVENOUS ONCE
Status: COMPLETED | OUTPATIENT
Start: 2024-03-11 | End: 2024-03-11

## 2024-03-11 RX ORDER — OXYCODONE HYDROCHLORIDE 5 MG/1
5-10 TABLET ORAL EVERY 6 HOURS PRN
Qty: 12 TABLET | Refills: 0 | Status: SHIPPED | OUTPATIENT
Start: 2024-03-11 | End: 2024-04-29

## 2024-03-11 RX ADMIN — KETOROLAC TROMETHAMINE 10 MG: 15 INJECTION, SOLUTION INTRAMUSCULAR; INTRAVENOUS at 18:52

## 2024-03-11 RX ADMIN — ONDANSETRON 4 MG: 2 INJECTION INTRAMUSCULAR; INTRAVENOUS at 20:11

## 2024-03-11 RX ADMIN — HYDROMORPHONE HYDROCHLORIDE 0.5 MG: 1 INJECTION, SOLUTION INTRAMUSCULAR; INTRAVENOUS; SUBCUTANEOUS at 19:45

## 2024-03-11 ASSESSMENT — COLUMBIA-SUICIDE SEVERITY RATING SCALE - C-SSRS
6. HAVE YOU EVER DONE ANYTHING, STARTED TO DO ANYTHING, OR PREPARED TO DO ANYTHING TO END YOUR LIFE?: NO
2. HAVE YOU ACTUALLY HAD ANY THOUGHTS OF KILLING YOURSELF IN THE PAST MONTH?: NO
1. IN THE PAST MONTH, HAVE YOU WISHED YOU WERE DEAD OR WISHED YOU COULD GO TO SLEEP AND NOT WAKE UP?: NO

## 2024-03-11 ASSESSMENT — ACTIVITIES OF DAILY LIVING (ADL)
ADLS_ACUITY_SCORE: 35

## 2024-03-11 NOTE — ED TRIAGE NOTES
"Ablation 3/1/24, \" chronic pain since then\" Pain is worse today. Has an appt tomorrow with surgeon. Tylenol today without relief. Tried muscle relaxer and \" I got really sick\"      Triage Assessment (Adult)       Row Name 03/11/24 3569          Triage Assessment    Airway WDL WDL        Respiratory WDL    Respiratory WDL WDL        Skin Circulation/Temperature WDL    Skin Circulation/Temperature WDL WDL        Cardiac WDL    Cardiac WDL WDL        Peripheral/Neurovascular WDL    Peripheral Neurovascular WDL WDL        Cognitive/Neuro/Behavioral WDL    Cognitive/Neuro/Behavioral WDL WDL                     "

## 2024-03-11 NOTE — ED PROVIDER NOTES
"  History     Chief Complaint   Patient presents with    Post-op Problem     Ablation 3/1/24, \" chronic pain since then\" Pain is worse today. Has an appt tomorrow with surgeon. Tylenol today without relief. Tried muscle relaxer and \" I got really sick\"      HPI    Daisa Orozco is a 68 year old female who is in from home with her  with neck pain.  She had some type of nerve ablation procedure done on March 1 at [facility].  We do not have any records of the procedure.  She had the same procedure performed on the left side of her neck 2 weeks before that did not have any problems.  On the right side of her neck immediately following the procedure she had pain and she is to continue to have pain since that time.  The pain is through her entire neck and it is difficult for her to move her neck.  She called the surgeon who did the procedure and was prescribed baclofen.  She took a half of the tablet and did not tolerate it and has not taken any since.  She now is using only acetaminophen for pain.  She is able to take anti-inflammatories.  She is not taking blood thinners.  She denies that she feels weak or numb in 1 arm or 1 leg or in both arms or legs.  She has not been having any fevers.  She has not sustained any additional trauma.    Allergies:  Allergies   Allergen Reactions    Fluticasone-Salmeterol Shortness Of Breath     Hypoxia      Amoxicillin-Pot Clavulanate Nausea and Vomiting     Vertigo    Doxycycline Headache and Nausea    Fumaric Acid Itching    Morphine Itching    Penicillins Other (See Comments)     vertigo     Sulfa Antibiotics Headache              Problem List:    Patient Active Problem List    Diagnosis Date Noted    Colon polyps 09/03/2019     Priority: Medium     Tubular adenoma and hyperplastic polyps      Pulmonary nodule 01/21/2015     Priority: Medium    COPD (chronic obstructive pulmonary disease) (H) 02/27/2013     Priority: Medium    Elevated cholesterol 11/27/2012     " Priority: Medium    Advanced directives, counseling/discussion 2012     Priority: Medium     Discussed advance care planning with patient; information given to patient to review. 2012         Gastric erosion 10/16/2008     Priority: Medium    Eczema 10/11/2007     Priority: Medium    Meniere's disease 2007     Priority: Medium    Anxiety 2007     Priority: Medium    Torticollis 2007     Priority: Medium        Past Medical History:    Past Medical History:   Diagnosis Date    COPD (chronic obstructive pulmonary disease) (H)     Meniere's disease        Past Surgical History:    Past Surgical History:   Procedure Laterality Date    CHOLECYSTECTOMY  2008    COLONOSCOPY N/A 2019    Procedure: COLONOSCOPY, WITH POLYPECTOMY AND BIOPSY;  Surgeon: Narciso Singh DO;  Location: [facility]       Family History:    Family History   Problem Relation Age of Onset    Respiratory Father         emphysema    Alzheimer Disease Maternal Grandmother     Cancer Maternal Grandfather         pancreatic cancer    Cancer Paternal Grandfather         lung    Gastrointestinal Disease Sister         diverticulis    Alzheimer Disease Mother        Social History:  Marital Status:  Single [1]  Social History     Tobacco Use    Smoking status: Former     Packs/day: 0.50     Years: 40.00     Additional pack years: 0.00     Total pack years: 20.00     Types: Cigarettes     Quit date: 2012     Years since quittin.3    Smokeless tobacco: Never   Substance Use Topics    Alcohol use: Yes     Comment: very little    Drug use: No        Medications:    oxyCODONE (ROXICODONE) 5 MG tablet  albuterol (PROVENTIL HFA: VENTOLIN HFA) 108 (90 BASE) MCG/ACT inhaler  Ascorbic Acid (VITAMIN C PO)  diclofenac (VOLTAREN) 1 % topical gel  Glucosamine HCl (GLUCOSAMINE PO)  ibuprofen (ADVIL/MOTRIN) 200 MG tablet  LORazepam (ATIVAN) 1 MG tablet  meclizine (ANTIVERT) 25 MG tablet  Multiple Vitamin (MULTI-VITAMIN)  "per tablet  predniSONE (DELTASONE) 20 MG tablet  probiotic CAPS  tiotropium (SPIRIVA HANDIHALER) 18 MCG inhaled capsule      Review of Systems  All other systems are reviewed and are negative    Physical Exam   BP: (!) 171/94  Pulse: 86  Temp: 96.8  F (36  C)  Resp: 14  Height: 157.5 cm (5' 2\")  Weight: 58.1 kg (128 lb)  SpO2: 98 %      Physical Exam    Nursing note and vitals were reviewed.  Constitutional: Awake and alert, adequately nourished and developed appearing 68-year-old in moderate discomfort, who does not appear acutely ill, and who answers questions appropriately and cooperates with examination.  HEENT: Speech is fluent.  Voice quality is normal.  EOMI.   Neck: She is not able to move her neck significantly in any direction without significant discomfort.  She has tenderness in the cervical paraspinal muscles bilaterally but no areas of erythema or swelling or crepitus.  No midline spinal tenderness.  Pulmonary/Chest: Breathing is unlabored.   Musculoskeletal: Extremities are warm and well-perfused and without edema  Neurological: Alert, oriented, thought content logical, coherent.  Motor strength is intact in the upper and lower extremities on manual muscle testing.  Motor tone is normal.  Skin: Warm, dry, no rashes.  Psychiatric: Affect angry with acute pain.    ED Course        Procedures              Critical Care time:  none               No results found for this or any previous visit (from the past 24 hour(s)).    Medications   HYDROmorphone (PF) (DILAUDID) injection 0.5 mg (0.5 mg Intravenous $Given 3/11/24 1945)   ketorolac (TORADOL) injection 10 mg (10 mg Intravenous $Given 3/11/24 1852)   ondansetron (ZOFRAN) injection 4 mg (4 mg Intravenous $Given 3/11/24 2011)     9:15 PM: Patient reassessed.  Neck pain is much improved.  She feels she has adequate relief.  She developed some abdominal discomfort that she calls gas after the medication was administered.  Assessments & Plan (with Medical " Decision Making)     68-year-old female came in with severe neck pain beginning immediately after some type of nerve ablation procedure performed in her neck at an outside facility for which records are not available to me.  She does not have any neurologic symptoms including no weakness or numbness in the arms or legs.  She is not having any fevers.  There is no swelling in the neck and no erythema or warmth or worrisome tenderness.  I thus do not have concern that there is some type of infectious process in the disc or in the spinal canal was in her pain and likely this is inflammatory reaction to the procedure that was performed.  She obtained good relief from ketorolac and a small dose of hydromorphone although the latter likely cause some abdominal discomfort.  He is due to see the physician who performed the procedure tomorrow.  I do not think she needs advanced imaging at this time with low suspicion for an infectious application or neurologic emergency given the lack absence of neurologic symptoms.  I have prescribed some additional oxycodone for her to use in addition to ibuprofen and acetaminophen.  She is only been using acetaminophen at home and likely will get better relief from the ibuprofen.  She is comfortable with this plan and she expressed understanding and her questions were all answered.    I have reviewed the nursing notes.    I have reviewed the findings, diagnosis, plan and need for follow up with the patient.         New Prescriptions    OXYCODONE (ROXICODONE) 5 MG TABLET    Take 1-2 tablets (5-10 mg) by mouth every 6 hours as needed for moderate to severe pain       Final diagnoses:   Neck pain       3/11/2024   [facility] EMERGENCY DEPT       Jakob Soto MD  03/11/24 8701

## 2024-03-12 NOTE — DISCHARGE INSTRUCTIONS
Take ibuprofen, 400 mg, 4 times per day if needed for pain.  You may add acetaminophen 650 mg 4 times per day if needed for pain.    You may add oxycodone 5 mg, 1-2 tablets up to every 6 hours if needed for pain.  Try to use this primarily only at night to help with sleep.    Do not use alcohol, operate machinery, drive, or climb on ladders, or perform other complex motor activity or make important decisions for 8 hours after taking oxycodone. Use docusate (100mg) 2 times a day to prevent constipation while on narcotics.  Follow-up as planned Ailyn with your orthopedic surgeon who performed your procedure.

## 2024-03-12 NOTE — ED NOTES
Patient had neck ablation on 3/01/24 reports increased pain since procedure. Has follow up scheduled.

## 2024-03-22 ENCOUNTER — TRANSCRIBE ORDERS (OUTPATIENT)
Dept: OTHER | Age: 69
End: 2024-03-22

## 2024-03-22 DIAGNOSIS — M43.6 TORTICOLLIS: Primary | ICD-10-CM

## 2024-04-29 ENCOUNTER — TELEPHONE (OUTPATIENT)
Dept: NEUROLOGY | Facility: CLINIC | Age: 69
End: 2024-04-29

## 2024-04-29 ENCOUNTER — OFFICE VISIT (OUTPATIENT)
Dept: NEUROLOGY | Facility: CLINIC | Age: 69
End: 2024-04-29
Attending: FAMILY MEDICINE
Payer: COMMERCIAL

## 2024-04-29 VITALS
SYSTOLIC BLOOD PRESSURE: 135 MMHG | HEIGHT: 62 IN | WEIGHT: 125 LBS | HEART RATE: 79 BPM | DIASTOLIC BLOOD PRESSURE: 91 MMHG | OXYGEN SATURATION: 98 % | BODY MASS INDEX: 23 KG/M2

## 2024-04-29 DIAGNOSIS — G24.3 CERVICAL DYSTONIA: Primary | ICD-10-CM

## 2024-04-29 DIAGNOSIS — M43.6 TORTICOLLIS: ICD-10-CM

## 2024-04-29 PROCEDURE — 99204 OFFICE O/P NEW MOD 45 MIN: CPT | Performed by: PSYCHIATRY & NEUROLOGY

## 2024-04-29 RX ORDER — ACETAMINOPHEN 500 MG
TABLET ORAL
COMMUNITY

## 2024-04-29 RX ORDER — IPRATROPIUM BROMIDE AND ALBUTEROL SULFATE 2.5; .5 MG/3ML; MG/3ML
3 SOLUTION RESPIRATORY (INHALATION)
COMMUNITY
Start: 2022-12-27

## 2024-04-29 RX ORDER — BECLOMETHASONE DIPROPIONATE HFA 40 UG/1
AEROSOL, METERED RESPIRATORY (INHALATION)
COMMUNITY

## 2024-04-29 NOTE — PROGRESS NOTES
Department of Neurology  Movement Disorders Division   New Patient Visit    Patient: Dasia Orozco   MRN: [id number]   : 1955   Date of Visit: 2024  PCP & referring provider: CELSO QUEZADA     CC:  Cervical dystonia    HPI:  Ms. Rodolfo Orozco is a 68 year old female with history significant for cervical dystonia who presents to movement disorder clinic to establish care. She is unaccompanied.    She was diagnosed with torticollis in the  after having her head turn and shake. She briefly tried muscle relaxants, physical therapy, accupuncture but then had botulinum toxin injections. Last injection was many years ago. This got better with Botox and physical therapy exercises/stretches, so injections were stopped.     This time around, there was a significant pain involved. This re-started last summer with pain and also pulling. She will also notice grimacing with stress. Improved with heating pads, resting head in hand.     She works as a PCA, has tried muscle relaxants, most recently baclofen. Unfortunately she does not tolerate this well, has brain fog, dry mouth, fatigued but can't get good sleep at as low dose of 10 mg daily.    MEDICATIONS reviewed & pertinent for:  As needed baclofen    ROS:  All others negative except as listed above.       Current Outpatient Medications:     acetaminophen (TYLENOL) 500 MG tablet, , Disp: , Rfl:     albuterol (PROVENTIL HFA: VENTOLIN HFA) 108 (90 BASE) MCG/ACT inhaler, Inhale 2 puffs into the lungs every 4 hours as needed for shortness of breath / dyspnea or wheezing Prior to exercise, Disp: 3 Inhaler, Rfl: 1    Ascorbic Acid (VITAMIN C PO), Take 1 tablet by mouth daily, Disp: , Rfl:     diclofenac (VOLTAREN) 1 % topical gel, Place 2 g onto the skin 4 times daily, Disp: 1 Tube, Rfl: 11    Glucosamine HCl (GLUCOSAMINE PO), Take 1 Dose by mouth daily Liquid, Disp: , Rfl:     ipratropium - albuterol 0.5 mg/2.5 mg/3 mL (DUONEB) 0.5-2.5 (3) MG/3ML neb  solution, Inhale 3 mLs into the lungs, Disp: , Rfl:     LORazepam (ATIVAN) 1 MG tablet, Take 0.5 mg by mouth Up to 3 times daily., Disp: 4 tablet, Rfl: 0    meclizine (ANTIVERT) 25 MG tablet, Take 0.5 tablets (12.5 mg) by mouth daily, Disp: 90 tablet, Rfl:     Multiple Vitamin (MULTI-VITAMIN) per tablet, Take 1 tablet by mouth daily., Disp: 100 tablet, Rfl: 3    omeprazole (PRILOSEC) 20 MG DR capsule, TAKE 1 CAPSULE (20 MG) BY MOUTH DAILY 1 HOUR BEFORE A MEAL., Disp: , Rfl:     probiotic CAPS, Take 1 capsule by mouth every evening, Disp: , Rfl:     QVAR REDIHALER 40 MCG/ACT inhaler, INHALE 2 PUFFS (80 MCG) TWO TIMES A DAY., Disp: , Rfl:     tiotropium (SPIRIVA HANDIHALER) 18 MCG inhaled capsule, Inhale 1 capsule into the lungs daily Using handihaler device, Disp: , Rfl:     ibuprofen (ADVIL/MOTRIN) 200 MG tablet, Take 400 mg by mouth every 4 hours as needed , Disp: , Rfl:     oxyCODONE (ROXICODONE) 5 MG tablet, Take 1-2 tablets (5-10 mg) by mouth every 6 hours as needed for moderate to severe pain, Disp: 12 tablet, Rfl: 0    predniSONE (DELTASONE) 20 MG tablet, Take two tablets (= 40mg) each day for 5 (five) days, Disp: 10 tablet, Rfl: 0    Current Facility-Administered Medications:     betamethasone acet & sod phos (CELESTONE) injection 6 mg, 6 mg, , , Jareth Smith MD, 6 mg at 03/01/21 1627    hylan (SYNVISC ONE) injection 48 mg, 48 mg, , , Jareth Smith MD, 48 mg at 01/15/21 0808    hylan (SYNVISC ONE) injection 48 mg, 48 mg, , , Jareth Smith MD, 48 mg at 01/10/20 1515    ropivacaine (NAROPIN) injection 3 mL, 3 mL, , , Jareth Smith MD, 3 mL at 03/01/21 1627     Allergies   Allergen Reactions    Fluticasone-Salmeterol Shortness Of Breath     Hypoxia      Amoxicillin-Pot Clavulanate Nausea and Vomiting     Vertigo    Doxycycline Headache and Nausea    Fumaric Acid Itching    Morphine Itching    Penicillins Other (See Comments)     vertigo     Sulfa Antibiotics Headache               Family  "History   Problem Relation Age of Onset    Respiratory Father         emphysema    Alzheimer Disease Maternal Grandmother     Cancer Maternal Grandfather         pancreatic cancer    Cancer Paternal Grandfather         lung    Gastrointestinal Disease Sister         diverticulis    Alzheimer Disease Mother         Social History:  She  reports that she quit smoking about 11 years ago. Her smoking use included cigarettes. She started smoking about 51 years ago. She has a 20 pack-year smoking history. She has never used smokeless tobacco. She reports current alcohol use. She reports that she does not use drugs.     PHYSICAL EXAM:  BP (!) 135/91   Pulse 79   Ht 1.575 m (5' 2\")   Wt 56.7 kg (125 lb)   LMP 09/11/2001   SpO2 98%   BMI 22.86 kg/m       Gen: alert, active, attentive, appropriately groomed     Neck: slight left torticollis, right laterocollis. Hypertrophy of R > L trapezius, splenius, and bilateral platysma. Occasional dystonic grimacing.     NEURO:  MS: Alert and oriented to person, place, time, and situation.  Speech normal to comprehension.  Recent and remote memory intact.  Attention and concentration normal.  Fund of knowledge normal.    CN:  Pupils equal, round, and reactive to light. VFF. EOM normal range, no nystagmus.  Normal saccades. Facial sensation intact. Face symmetric at rest and with activation. Hearing grossly intact to conversation. Palate rise b/l, uvula midline.  No dysarthria. Shoulder shrug symmetric and without lag bilaterally. Tongue protrudes midline. No fasciculation or atrophy noted.    Motor:  Normal bulk and tone throughout upper and lower extremities.  No abnormal movements or fasciculations observed. Strength is 5/5 throughout and symmetric.     Reflexes:  1+ & symmetric throughout    Sensation:  Intact to LT in all extremities.    Coordination:  FTN without dysmetria bilaterally.    Gait:  Normal gait.    ASSESSMENT:  Ms. Rodolfo Orozco is a 68 year old female with " history significant for cervical dystonia who presents to movement disorder clinic to establish care.  Exam is notable for cervical dystonia with with a left torticollis, right lateral Yadira no significant anterocollis or retrocollis.  She presents today to establish care and to restart botulinum toxin injections, which she has had good success with many years ago.  Consider injections for left SCM, right > left trapezius and splenius and also bilateral platysma.  She does occasionally receive cosmetic botulinum toxin in the procerus and  region, but is unclear which brand the use.  She will reach out to these injectors and notify us.  We will plan to use the same brand that she uses for cosmetic injections.  I did recommend that she sync her cosmetic and medical injections as much as possible to limit frequency of injections.    PLAN:  -Pending known brand, we will plan for neurotoxin injections in 1 month for cervical dystonia.  She will notify me via [employer] which brand she uses and I will start the prior authorization process at that point.  I did stress timeliness was important on this response.    RTC 1 month, neurotoxin injections    Lizeth Shaikh MD    [facility]  Department of Neurology       48 minutes spent on the date of the encounter doing chart review, history and exam, documentation and further activities as noted above

## 2024-04-29 NOTE — NURSING NOTE
"Dasia Orozco is a 68 year old female who presents for:  Chief Complaint   Patient presents with    Torticollis     Torticollis, discuss botox, referred by Dr. Flanagan  - has had botox in the past for the Toricollis        Initial Vitals:  BP (!) 135/91   Pulse 79   Ht 1.575 m (5' 2\")   Wt 56.7 kg (125 lb)   LMP 09/11/2001   SpO2 98%   BMI 22.86 kg/m   Estimated body mass index is 22.86 kg/m  as calculated from the following:    Height as of this encounter: 1.575 m (5' 2\").    Weight as of this encounter: 56.7 kg (125 lb).. Body surface area is 1.57 meters squared. BP completed using cuff size: zhang Mccracken CMA    "

## 2024-04-29 NOTE — PATIENT INSTRUCTIONS
Reach out to your cosmetic injectors and ask what brand of botulinum toxin you're using. Let me know what brand this is via [employer] and I will order this. Also please let me know whether Joanne (Monday PM) or Tia (Friday PM) work best for you.     Once you tell me those two answers, I'll put it through your insurance and order this.

## 2024-04-29 NOTE — TELEPHONE ENCOUNTER
[facility] Call Center    Phone Message    May a detailed message be left on voicemail: yes     Reason for Call: Other:    Patient called to let care team know that she had been on Allergan Botox in the past. Patient had no further information at this time.     Action Taken: Message routed to:  Other: [facility]    Travel Screening: Not Applicable

## 2024-05-14 ENCOUNTER — TELEPHONE (OUTPATIENT)
Dept: NEUROLOGY | Facility: CLINIC | Age: 69
End: 2024-05-14
Payer: COMMERCIAL

## 2024-05-14 NOTE — TELEPHONE ENCOUNTER
[facility] Call Center    Phone Message    May a detailed message be left on voicemail: yes     Reason for Call: Pt asking for a call back to talk about authorization for the Botox.     Please call Dasia at [phone] to discuss further.    Action Taken: Message routed to:  Other: [facility]    Travel Screening: Not Applicable

## 2024-05-15 NOTE — TELEPHONE ENCOUNTER
Pt scheduled for botox 6/3/24 with Dr. Shaikh.     CAM team are you able to look into patients authorization status?    Lin GARCIA RN, BSN  [facility]

## 2024-05-15 NOTE — TELEPHONE ENCOUNTER
LDVM on detailed voicemail relaying that botox was authorized.  Advised to call back if she has further questions.     Lin GARCIA RN, BSN  [facility]

## 2024-06-03 ENCOUNTER — OFFICE VISIT (OUTPATIENT)
Dept: NEUROLOGY | Facility: CLINIC | Age: 69
End: 2024-06-03
Payer: COMMERCIAL

## 2024-06-03 DIAGNOSIS — G24.3 CERVICAL DYSTONIA: Primary | ICD-10-CM

## 2024-06-03 PROCEDURE — 64616 CHEMODENERV MUSC NECK DYSTON: CPT | Mod: 50 | Performed by: PSYCHIATRY & NEUROLOGY

## 2024-06-03 PROCEDURE — 95874 GUIDE NERV DESTR NEEDLE EMG: CPT | Performed by: PSYCHIATRY & NEUROLOGY

## 2024-06-03 NOTE — PROGRESS NOTES
Movement Disorders Botulinum Toxin Clinic Note    History of Present Illness:  Dasia Orozco is a 69 year old female who presents to clinic for botulinum toxin injections for treatment of cervical dystonia. Neurotoxin injections are first line gold standard treatment for focal dystonia.  Today is her first appointment at [facility] for injections, although she has had them many years ago.    Additional interval history:     Patient denies new medical diagnoses, illnesses, hospitalizations, emergency room visits, and injuries since the previous appointment on 2024.    Physical Examination:  Vital Signs:   vitals were not taken for this visit.   slight left torticollis, right laterocollis. Hypertrophy of R > L trapezius, splenius, and bilateral platysma. Occasional dystonic grimacing     BOTULINUM NEUROTOXIN INJECTION PROCEDURES:    VERIFICATION OF PATIENT IDENTIFICATION AND PROCEDURE     Initials   Patient Name KG    Patient  KG    Procedure Verified by: KG      Prior to the start of the procedure and with procedural staff participation, I verbally confirmed the patient s identity using two indicators, relevant allergies, that the procedure was appropriate and matched the consent or emergent situation, and that the correct equipment/implants were available. Immediately prior to starting the procedure I conducted the Time Out with the procedural staff and re-confirmed the patient s name, procedure, and site/side. (The Joint Commission universal protocol was followed.)  Yes    Sedation (Moderate or Deep): None      Above assessments performed by:  Provider: Lizeth Shaikh MD     INDICATION/S FOR PROCEDURE/S:  Dasia Orozco is a 69 year old patient with dystonia affecting the  head, neck and shoulder girdle musculature secondary to a diagnosis of cervical dystonia with associated  spasms, loss of volitional motor control, and difficulty with activities of daily living.     Her baseline symptoms  have been recalcitrant to oral medications and conservative therapy.  She is here today for an injection of Botox.      GOAL OF PROCEDURE:  The goal of this procedure is to increase active range of motion, improve volitional motor control, and enhance functional independence associated with dystonic movements.    TOTAL DOSE ADMINISTERED:  Dose Administered:  80 units Botox    Diluent Used:  Preservative Free Normal Saline  Total Volume of Diluent Used:  1 mL/100 units    CONSENT:  The risks, benefits, and treatment options were discussed with Dasia Orozco and agreed to proceed.      Written consent was obtained by KG.     EQUIPMENT USED:  Needle-27mm stimulating/recording  EMG/NCS Machine    SKIN PREPARATION:  Skin preparation was performed using an alcohol wipe.    GUIDANCE DESCRIPTION:  Electro-myographic guidance was necessary throughout the procedure to accurately identify all areas of dystonic muscles while avoiding injection of non-dystonic muscles, neighboring nerves and nearby vascular structures.     AREA/MUSCLE INJECTED:    Muscles Injected Units Injected Number of Injections   R splenius 15 1   R trapezius 15, 15 2   R levator 10 1        L SCM  15 1   L trapezius 10 1        Total Units Injected: 80    Unavoidable Waste: 20    Total Units Billed 100    ( ) = previous dose    The patient tolerated the injections without difficulty.    Assessment:    Dasia Orozco is a 69 year old female with cervical dystonia.  Today we did initial botulinum toxin injections.    Plan  Message via [employer] or call the clinic in 4-6 weeks for an update  Avoid heat and cold pack and massaging the areas injected for 24 hours post injections  Follow-up in 3 and 6 months to consider repeat injections    Lizteh Shaikh MD    [facility]  Department of Neurology

## 2024-06-03 NOTE — LETTER
6/3/2024      Dasia Orozco  [address]  [facility] [address]      Dear Colleague,    Thank you for referring your patient, Dasia Orozco, to the [facility] NEUROLOGY CLINICS [facility]. Please see a copy of my visit note below.    Movement Disorders Botulinum Toxin Clinic Note    History of Present Illness:  Dasia Orozco is a 69 year old female who presents to clinic for botulinum toxin injections for treatment of cervical dystonia. Neurotoxin injections are first line gold standard treatment for focal dystonia.  Today is her first appointment at [place] for injections, although she has had them many years ago.    Additional interval history:     Patient denies new medical diagnoses, illnesses, hospitalizations, emergency room visits, and injuries since the previous appointment on 2024.    Physical Examination:  Vital Signs:   vitals were not taken for this visit.   slight left torticollis, right laterocollis. Hypertrophy of R > L trapezius, splenius, and bilateral platysma. Occasional dystonic grimacing     BOTULINUM NEUROTOXIN INJECTION PROCEDURES:    VERIFICATION OF PATIENT IDENTIFICATION AND PROCEDURE     Initials   Patient Name KG    Patient  KG    Procedure Verified by: KG      Prior to the start of the procedure and with procedural staff participation, I verbally confirmed the patient s identity using two indicators, relevant allergies, that the procedure was appropriate and matched the consent or emergent situation, and that the correct equipment/implants were available. Immediately prior to starting the procedure I conducted the Time Out with the procedural staff and re-confirmed the patient s name, procedure, and site/side. (The Joint Commission universal protocol was followed.)  Yes    Sedation (Moderate or Deep): None      Above assessments performed by:  Provider: Lizeth Shaikh MD     INDICATION/S FOR PROCEDURE/S:  Dasia Orozco is a 69  year old patient with dystonia affecting the  head, neck and shoulder girdle musculature secondary to a diagnosis of cervical dystonia with associated  spasms, loss of volitional motor control, and difficulty with activities of daily living.     Her baseline symptoms have been recalcitrant to oral medications and conservative therapy.  She is here today for an injection of Botox.      GOAL OF PROCEDURE:  The goal of this procedure is to increase active range of motion, improve volitional motor control, and enhance functional independence associated with dystonic movements.    TOTAL DOSE ADMINISTERED:  Dose Administered:  80 units Botox    Diluent Used:  Preservative Free Normal Saline  Total Volume of Diluent Used:  1 mL/100 units    CONSENT:  The risks, benefits, and treatment options were discussed with Dasia Orozco and agreed to proceed.      Written consent was obtained by KG.     EQUIPMENT USED:  Needle-27mm stimulating/recording  EMG/NCS Machine    SKIN PREPARATION:  Skin preparation was performed using an alcohol wipe.    GUIDANCE DESCRIPTION:  Electro-myographic guidance was necessary throughout the procedure to accurately identify all areas of dystonic muscles while avoiding injection of non-dystonic muscles, neighboring nerves and nearby vascular structures.     AREA/MUSCLE INJECTED:    Muscles Injected Units Injected Number of Injections   R splenius 15 1   R trapezius 15, 15 2   R levator 10 1        L SCM  15 1   L trapezius 10 1        Total Units Injected: 80    Unavoidable Waste: 20    Total Units Billed 100    ( ) = previous dose    The patient tolerated the injections without difficulty.    Assessment:    Dasia Orozco is a 69 year old female with cervical dystonia.  Today we did initial botulinum toxin injections.    Plan  Message via [employer] or call the clinic in 4-6 weeks for an update  Avoid heat and cold pack and massaging the areas injected for 24 hours post  injections  Follow-up in 3 and 6 months to consider repeat injections    Lizeth Shaikh MD    [facility]  Department of Neurology                Again, thank you for allowing me to participate in the care of your patient.        Sincerely,        Lizeth Shaikh MD

## 2024-06-04 ENCOUNTER — TELEPHONE (OUTPATIENT)
Dept: NEUROLOGY | Facility: CLINIC | Age: 69
End: 2024-06-04

## 2024-06-04 NOTE — TELEPHONE ENCOUNTER
[facility] Call Center    Phone Message    May a detailed message be left on voicemail: yes     Reason for Call: Pt has questions on what can be done in the future about a cosmetic procedure she is looking into. Please call Dasia at # [phone] to discuss further.    Action Taken: Message routed to:  Other:   Neurology    Travel Screening: Not Applicable     Date of Service:

## 2024-06-04 NOTE — TELEPHONE ENCOUNTER
Pt is wondering if she can get the Morpheus treatment and if she should wait.     Pt has had this procedure before and looking online it is a micro needling device that utilizes radiofrequency.     Pt would have it on her neck beneath the chin.     Pt also asked about using TENs unit and biofreeze strip.  RN advised that we recommend avoid massage/pressure to area for first 24 hours.  Can use biofreez but would avoid putting directly on injection site. She verbalized understanding.      Will have provider review on if pt can do Morpheus treatment and if so, how long should she wait.     Lin GARCIA RN, BSN  [facility] Neurology

## 2024-06-06 NOTE — TELEPHONE ENCOUNTER
That's fine. I would avoid doing it within 1 week of Botox injections.     Lizeth Shaikh MD    [facility]  Department of Neurology

## 2024-07-01 ENCOUNTER — TRANSCRIBE ORDERS (OUTPATIENT)
Dept: OTHER | Age: 69
End: 2024-07-01

## 2024-07-01 DIAGNOSIS — Z86.0100 HISTORY OF COLONIC POLYPS: Primary | ICD-10-CM

## 2024-07-22 ENCOUNTER — HOSPITAL ENCOUNTER (OUTPATIENT)
Dept: MRI IMAGING | Facility: CLINIC | Age: 69
Discharge: HOME | End: 2024-07-22
Attending: FAMILY MEDICINE | Admitting: FAMILY MEDICINE
Payer: COMMERCIAL

## 2024-07-22 DIAGNOSIS — M54.2 NECK PAIN: ICD-10-CM

## 2024-07-22 PROCEDURE — 72141 MRI NECK SPINE W/O DYE: CPT

## 2024-08-03 ENCOUNTER — HEALTH MAINTENANCE LETTER (OUTPATIENT)
Age: 69
End: 2024-08-03

## 2024-09-08 NOTE — PROGRESS NOTES
Movement Disorders Botulinum Toxin Clinic Note    Chief Complaint: cervical dystonia    History of Present Illness:  Dasia Orozco is a 69 year old female who presents to clinic for botulinum toxin injections for treatment of cervical dystonia. Neurotoxin injections are first line gold standard treatment for focal dystonia.  Today is her second appointment at [place] for injections, although she has had them many years ago.       Response to Last Injection: 6/3/24    Onset of effect:  ~3wks    Benefit from last injections:  improved turning and head tremor, felt she was able to read, use her phone, and drive more effectively and with less discomfort.    Wearing off: ~1-2wks ago    Side effects: she noticed significant neck weakness and pain about 2 weeks after injections. She describes a feeling that it was particularly uncomfortable to keep her head up. She also noticed R shoulder pain. Unclear if this was directly related to Botox injections, it improved spontaneously ~2wks after onset.    Additional interval history:   She underwent MRI C spine due to ongoing neck pain radiating to her R shoulder. There were multiple degenerative changes, report impression below. She notes significant improvement in pain. She underwent a nerve ablation in March, 2024.      MRI C Spine w/o contrast 7/22/24  IMPRESSION:  1. In the interim, there appears to be a subtle new edema-like marrow signal changes centered about the right C3-C4 facet joint in the setting of moderate-appearing facet arthropathy, presumably reactive to degenerative facet disease. Please correlate clinically.  2. Moderate bilateral C5-C6 neural foraminal stenosis may be slightly progressed.  3. Otherwise, no significant change in multilevel degenerative  findings elsewhere, as described.  4. Mild spinal canal stenosis at C5-C6 and C6-C7. Moderate to severe right C3-C4 neural foraminal stenosis. There is at least moderate bilateral C5-C6 and moderate  left C6-C7 neural foraminal stenosis.         Current Outpatient Medications   Medication Sig Dispense Refill    acetaminophen (TYLENOL) 500 MG tablet       albuterol (PROVENTIL HFA: VENTOLIN HFA) 108 (90 BASE) MCG/ACT inhaler Inhale 2 puffs into the lungs every 4 hours as needed for shortness of breath / dyspnea or wheezing Prior to exercise 3 Inhaler 1    Ascorbic Acid (VITAMIN C PO) Take 1 tablet by mouth daily      diclofenac (VOLTAREN) 1 % topical gel Place 2 g onto the skin 4 times daily 1 Tube 11    Glucosamine HCl (GLUCOSAMINE PO) Take 1 Dose by mouth daily Liquid      ibuprofen (ADVIL/MOTRIN) 200 MG tablet Take 400 mg by mouth every 4 hours as needed       ipratropium - albuterol 0.5 mg/2.5 mg/3 mL (DUONEB) 0.5-2.5 (3) MG/3ML neb solution Inhale 3 mLs into the lungs      LORazepam (ATIVAN) 1 MG tablet Take 0.5 mg by mouth Up to 3 times daily. 4 tablet 0    meclizine (ANTIVERT) 25 MG tablet Take 0.5 tablets (12.5 mg) by mouth daily 90 tablet     Multiple Vitamin (MULTI-VITAMIN) per tablet Take 1 tablet by mouth daily. 100 tablet 3    omeprazole (PRILOSEC) 20 MG DR capsule TAKE 1 CAPSULE (20 MG) BY MOUTH DAILY 1 HOUR BEFORE A MEAL.      predniSONE (DELTASONE) 20 MG tablet Take two tablets (= 40mg) each day for 5 (five) days 10 tablet 0    probiotic CAPS Take 1 capsule by mouth every evening      QVAR REDIHALER 40 MCG/ACT inhaler INHALE 2 PUFFS (80 MCG) TWO TIMES A DAY.      tiotropium (SPIRIVA HANDIHALER) 18 MCG inhaled capsule Inhale 1 capsule into the lungs daily Using handihaler device         Allergies: She is allergic to fluticasone-salmeterol, amoxicillin-pot clavulanate, doxycycline, fumaric acid, morphine, penicillins, and sulfa antibiotics.      Physical Examination:  Vital Signs:   vitals were not taken for this visit.   Mild R torticollis, R shoulder elevation, and slight R laterocollis.    BOTULINUM NEUROTOXIN INJECTION PROCEDURES:    VERIFICATION OF PATIENT IDENTIFICATION AND PROCEDURE      Initials   Patient Name KG   Patient  KG   Procedure Verified by: KG     Above assessments performed by:  Dr. Lizeth Shaikh      INDICATION/S FOR PROCEDURE/S:  Dasia Orozco is a 69 year old patient with dystonia affecting the  head, neck and shoulder girdle musculature secondary to a diagnosis of cervical dystonia with associated  spasms, loss of volitional motor control, and difficulty with activities of daily living.     Her baseline symptoms have been recalcitrant to oral medications and conservative therapy.  She is here today for an injection of Botox.      GOAL OF PROCEDURE:  The goal of this procedure is to increase active range of motion, improve volitional motor control, and enhance functional independence associated with dystonic movements.    TOTAL DOSE ADMINISTERED:  Dose Administered:  80 units Botox    Diluent Used:  Preservative Free Normal Saline  Total Volume of Diluent Used:  1mL/100units    CONSENT:  The risks, benefits, and treatment options were discussed with Dasia Orozco and she agreed to proceed.      Written consent was obtained by NANCI    EQUIPMENT USED:  Needle-37mm stimulating/recording    SKIN PREPARATION:  Skin preparation was performed using an alcohol wipe.    GUIDANCE DESCRIPTION:  Electro-myographic guidance was necessary throughout the procedure to accurately identify all areas of dystonic muscles while avoiding injection of non-dystonic muscles, neighboring nerves and nearby vascular structures.     AREA/MUSCLE INJECTED:    Visual display of locations injections are scanned into the chart under MEDIA tab.         Muscles Injected Units Injected Number of Injections   R splenius 15 (15) 1   R trapezius 20, 10 (15, 15) 2   R levator 10 (10) 1           L SCM  15 (15) 1   L trapezius 10 (10) 1           Total Units Injected: 80     Unavoidable Waste: 20     Total Units Billed 100          The patient tolerated the injections without difficulty. Of note, there  was relatively low EMG activity in proximal portion of L SCM. Could consider injection in distal/clavicular portion in the future.       Assessment:    Dasia Orozco is a 69 year old female with cervical dystonia.  Today we did repeat botulinum toxin injections.    Plan  Follow-up in 3 and 6 months' time to consider repeat injections      King Artis MD  Fellow  Movement Disorders Division  [facility] Department of Neurology    Patient seen and discussed with attending neurologist Dr. Lizeth Shaikh

## 2024-09-09 ENCOUNTER — OFFICE VISIT (OUTPATIENT)
Dept: NEUROLOGY | Facility: CLINIC | Age: 69
End: 2024-09-09
Payer: COMMERCIAL

## 2024-09-09 DIAGNOSIS — G24.3 CERVICAL DYSTONIA: Primary | ICD-10-CM

## 2024-09-09 PROCEDURE — 95874 GUIDE NERV DESTR NEEDLE EMG: CPT | Performed by: INTERNAL MEDICINE

## 2024-09-09 PROCEDURE — 64616 CHEMODENERV MUSC NECK DYSTON: CPT | Mod: 50 | Performed by: INTERNAL MEDICINE

## 2024-09-09 NOTE — LETTER
9/9/2024      Dasia Orozco  [address]  [facility] [address]      Dear Colleague,    Thank you for referring your patient, Dasia Orozco, to the [facility] NEUROLOGY CLINICS [facility]. Please see a copy of my visit note below.    Movement Disorders Botulinum Toxin Clinic Note    Chief Complaint: cervical dystonia    History of Present Illness:  Dasia Orozco is a 69 year old female who presents to clinic for botulinum toxin injections for treatment of cervical dystonia. Neurotoxin injections are first line gold standard treatment for focal dystonia.  Today is her second appointment at [place] for injections, although she has had them many years ago.       Response to Last Injection: 6/3/24    Onset of effect:  ~3wks    Benefit from last injections:  improved turning and head tremor, felt she was able to read, use her phone, and drive more effectively and with less discomfort.    Wearing off: ~1-2wks ago    Side effects: she noticed significant neck weakness and pain about 2 weeks after injections. She describes a feeling that it was particularly uncomfortable to keep her head up. She also noticed R shoulder pain. Unclear if this was directly related to Botox injections, it improved spontaneously ~2wks after onset.    Additional interval history:   She underwent MRI C spine due to ongoing neck pain radiating to her R shoulder. There were multiple degenerative changes, report impression below. She notes significant improvement in pain. She underwent a nerve ablation in March, 2024.      MRI C Spine w/o contrast 7/22/24  IMPRESSION:  1. In the interim, there appears to be a subtle new edema-like marrow signal changes centered about the right C3-C4 facet joint in the setting of moderate-appearing facet arthropathy, presumably reactive to degenerative facet disease. Please correlate clinically.  2. Moderate bilateral C5-C6 neural foraminal stenosis may be slightly  progressed.  3. Otherwise, no significant change in multilevel degenerative  findings elsewhere, as described.  4. Mild spinal canal stenosis at C5-C6 and C6-C7. Moderate to severe right C3-C4 neural foraminal stenosis. There is at least moderate bilateral C5-C6 and moderate left C6-C7 neural foraminal stenosis.         Current Outpatient Medications   Medication Sig Dispense Refill     acetaminophen (TYLENOL) 500 MG tablet        albuterol (PROVENTIL HFA: VENTOLIN HFA) 108 (90 BASE) MCG/ACT inhaler Inhale 2 puffs into the lungs every 4 hours as needed for shortness of breath / dyspnea or wheezing Prior to exercise 3 Inhaler 1     Ascorbic Acid (VITAMIN C PO) Take 1 tablet by mouth daily       diclofenac (VOLTAREN) 1 % topical gel Place 2 g onto the skin 4 times daily 1 Tube 11     Glucosamine HCl (GLUCOSAMINE PO) Take 1 Dose by mouth daily Liquid       ibuprofen (ADVIL/MOTRIN) 200 MG tablet Take 400 mg by mouth every 4 hours as needed        ipratropium - albuterol 0.5 mg/2.5 mg/3 mL (DUONEB) 0.5-2.5 (3) MG/3ML neb solution Inhale 3 mLs into the lungs       LORazepam (ATIVAN) 1 MG tablet Take 0.5 mg by mouth Up to 3 times daily. 4 tablet 0     meclizine (ANTIVERT) 25 MG tablet Take 0.5 tablets (12.5 mg) by mouth daily 90 tablet      Multiple Vitamin (MULTI-VITAMIN) per tablet Take 1 tablet by mouth daily. 100 tablet 3     omeprazole (PRILOSEC) 20 MG DR capsule TAKE 1 CAPSULE (20 MG) BY MOUTH DAILY 1 HOUR BEFORE A MEAL.       predniSONE (DELTASONE) 20 MG tablet Take two tablets (= 40mg) each day for 5 (five) days 10 tablet 0     probiotic CAPS Take 1 capsule by mouth every evening       QVAR REDIHALER 40 MCG/ACT inhaler INHALE 2 PUFFS (80 MCG) TWO TIMES A DAY.       tiotropium (SPIRIVA HANDIHALER) 18 MCG inhaled capsule Inhale 1 capsule into the lungs daily Using handihaler device         Allergies: She is allergic to fluticasone-salmeterol, amoxicillin-pot clavulanate, doxycycline, fumaric acid, morphine,  penicillins, and sulfa antibiotics.      Physical Examination:  Vital Signs:   vitals were not taken for this visit.   Mild R torticollis, R shoulder elevation, and slight R laterocollis.    BOTULINUM NEUROTOXIN INJECTION PROCEDURES:    VERIFICATION OF PATIENT IDENTIFICATION AND PROCEDURE     Initials   Patient Name KG   Patient  KG   Procedure Verified by: KG     Above assessments performed by:  Dr. Lizeth Shaikh      INDICATION/S FOR PROCEDURE/S:  Dasia Orozco is a 69 year old patient with dystonia affecting the  head, neck and shoulder girdle musculature secondary to a diagnosis of cervical dystonia with associated  spasms, loss of volitional motor control, and difficulty with activities of daily living.     Her baseline symptoms have been recalcitrant to oral medications and conservative therapy.  She is here today for an injection of Botox.      GOAL OF PROCEDURE:  The goal of this procedure is to increase active range of motion, improve volitional motor control, and enhance functional independence associated with dystonic movements.    TOTAL DOSE ADMINISTERED:  Dose Administered:  80 units Botox    Diluent Used:  Preservative Free Normal Saline  Total Volume of Diluent Used:  1mL/100units    CONSENT:  The risks, benefits, and treatment options were discussed with Dasia Orozco and she agreed to proceed.      Written consent was obtained by NANCI    EQUIPMENT USED:  Needle-37mm stimulating/recording    SKIN PREPARATION:  Skin preparation was performed using an alcohol wipe.    GUIDANCE DESCRIPTION:  Electro-myographic guidance was necessary throughout the procedure to accurately identify all areas of dystonic muscles while avoiding injection of non-dystonic muscles, neighboring nerves and nearby vascular structures.     AREA/MUSCLE INJECTED:    Visual display of locations injections are scanned into the chart under MEDIA tab.         Muscles Injected Units Injected Number of Injections   R  splenius 15 (15) 1   R trapezius 20, 10 (15, 15) 2   R levator 10 (10) 1           L SCM  15 (15) 1   L trapezius 10 (10) 1           Total Units Injected: 80     Unavoidable Waste: 20     Total Units Billed 100          The patient tolerated the injections without difficulty. Of note, there was relatively low EMG activity in proximal portion of L SCM. Could consider injection in distal/clavicular portion in the future.       Assessment:    Dasia Orozco is a 69 year old female with cervical dystonia.  Today we did repeat botulinum toxin injections.    Plan  Follow-up in 3 and 6 months' time to consider repeat injections      King Artis MD  Fellow  Movement Disorders Division  [facility] Department of Neurology    Patient seen and discussed with attending neurologist Dr. Lizeth Shaikh        Attestation signed by Lizeth Shaikh MD at 9/19/2024  9:42 PM:  Neurology Attending Attestation:   I personally saw this patient with our neurology fellow and agree with the fellow's findings and plan of care as documented in the fellow's note. I was physically present for the entirety of today's procedure.     Ms. Rodolfo Orozco is a 69 year old female who presents for neurotoxin injections for cervical dystonia. She noticed improvement in head turning and tremor after her first injections.  She did have an unclear weakness and pain that occurred approximately 2 weeks after her injection but improved over the next 2 weeks.  We discussed in depth that that this is atypical for an injection reaction given how long it took to start but also as a reaction to the Botox as it improved shortly after starting and I would expect botulinum toxin reaction to have a little bit longer duration.  Overall though, she notes that the improvement of her function is worth retrying the injections.  I did increase the dose in her trapezius today, EMG activity is notable in this muscle.  We will plan for follow-up in 3 and 6  months to consider repeat injection.    Lizeth Shaikh MD    [facility]  Department of Neurology       Again, thank you for allowing me to participate in the care of your patient.        Sincerely,        Lizeth Shaikh MD

## 2024-09-26 ENCOUNTER — ANESTHESIA EVENT (OUTPATIENT)
Dept: GASTROENTEROLOGY | Facility: CLINIC | Age: 69
End: 2024-09-26
Payer: COMMERCIAL

## 2024-09-26 ASSESSMENT — COPD QUESTIONNAIRES: COPD: 1

## 2024-09-26 ASSESSMENT — LIFESTYLE VARIABLES: TOBACCO_USE: 1

## 2024-09-26 NOTE — ANESTHESIA PREPROCEDURE EVALUATION
Anesthesia Pre-Procedure Evaluation    Patient: Dasia Orozco   MRN: [id number] : 1955        Procedure : Procedure(s):  Colonoscopy          Past Medical History:   Diagnosis Date    COPD (chronic obstructive pulmonary disease) (H)     Meniere's disease       Past Surgical History:   Procedure Laterality Date    CHOLECYSTECTOMY  2008    COLONOSCOPY N/A 2019    Procedure: COLONOSCOPY, WITH POLYPECTOMY AND BIOPSY;  Surgeon: Narciso Singh DO;  Location: [facility]      Allergies   Allergen Reactions    Fluticasone-Salmeterol Shortness Of Breath     Hypoxia      Amoxicillin-Pot Clavulanate Nausea and Vomiting     Vertigo    Doxycycline Headache and Nausea    Fumaric Acid Itching    Morphine Itching    Penicillins Other (See Comments)     vertigo     Sulfa Antibiotics Headache             Social History     Tobacco Use    Smoking status: Former     Current packs/day: 0.00     Average packs/day: 0.5 packs/day for 40.0 years (20.0 ttl pk-yrs)     Types: Cigarettes     Start date: 1972     Quit date: 2012     Years since quittin.8    Smokeless tobacco: Never   Substance Use Topics    Alcohol use: Yes     Comment: very little      Wt Readings from Last 1 Encounters:   24 56.7 kg (125 lb)        Anesthesia Evaluation            ROS/MED HX  ENT/Pulmonary:     (+)                tobacco use, Past use,         COPD,              Neurologic:       Cardiovascular:     (+) Dyslipidemia - -   -  - -                                      METS/Exercise Tolerance:     Hematologic:       Musculoskeletal:       GI/Hepatic:     (+) GERD,                   Renal/Genitourinary:       Endo:       Psychiatric/Substance Use:     (+) psychiatric history anxiety       Infectious Disease:       Malignancy:       Other:          Physical Exam    Airway        Mallampati: I   TM distance: > 3 FB   Neck ROM: full   Mouth opening: > 3 cm    Respiratory Devices and Support         Dental      "  (+) Minor Abnormalities - some fillings, tiny chips      Cardiovascular   cardiovascular exam normal          Pulmonary   pulmonary exam normal                OUTSIDE LABS:  CBC:   Lab Results   Component Value Date    WBC 6.1 12/01/2020    WBC 5.8 11/14/2012    HGB 14.1 12/01/2020    HGB 14.5 11/14/2012    HCT 43.5 12/01/2020    HCT 44.2 11/14/2012     12/01/2020     11/14/2012     BMP:   Lab Results   Component Value Date     12/01/2020     11/14/2012    POTASSIUM 4.3 12/01/2020    POTASSIUM 4.2 11/14/2012    CHLORIDE 107 12/01/2020    CHLORIDE 105 11/14/2012    CO2 29 12/01/2020    CO2 28 11/14/2012    BUN 12 12/01/2020    BUN 14 11/14/2012    CR 0.73 12/01/2020    CR 0.72 11/14/2012    GLC 88 12/01/2020     (H) 11/14/2012     COAGS:   Lab Results   Component Value Date    PTT 30 09/15/2008    INR 0.97 09/15/2008     POC: No results found for: \"BGM\", \"HCG\", \"HCGS\"  HEPATIC:   Lab Results   Component Value Date    ALBUMIN 4.3 12/01/2020    PROTTOTAL 7.3 12/01/2020    ALT 29 12/01/2020    AST 21 12/01/2020    ALKPHOS 75 12/01/2020    BILITOTAL 1.0 12/01/2020     OTHER:   Lab Results   Component Value Date    MARYCHUY 8.9 12/01/2020    LIPASE 185 09/04/2007       Anesthesia Plan    ASA Status:  3       Anesthesia Type: General.              Consents    Anesthesia Plan(s) and associated risks, benefits, and realistic alternatives discussed. Questions answered and patient/representative(s) expressed understanding.     - Discussed: Risks, Benefits and Alternatives for BOTH SEDATION and the PROCEDURE were discussed     - Discussed with:  Patient            Postoperative Care            Comments:               PORTER Reyes CRNA    I have reviewed the pertinent notes and labs in the chart from the past 30 days and (re)examined the patient.  Any updates or changes from those notes are reflected in this note.                  "

## 2024-09-27 ENCOUNTER — HOSPITAL ENCOUNTER (OUTPATIENT)
Facility: CLINIC | Age: 69
Discharge: HOME | End: 2024-09-27
Attending: STUDENT IN AN ORGANIZED HEALTH CARE EDUCATION/TRAINING PROGRAM | Admitting: STUDENT IN AN ORGANIZED HEALTH CARE EDUCATION/TRAINING PROGRAM
Payer: COMMERCIAL

## 2024-09-27 ENCOUNTER — ANESTHESIA (OUTPATIENT)
Dept: GASTROENTEROLOGY | Facility: CLINIC | Age: 69
End: 2024-09-27
Payer: COMMERCIAL

## 2024-09-27 VITALS
HEART RATE: 73 BPM | BODY MASS INDEX: 22.08 KG/M2 | SYSTOLIC BLOOD PRESSURE: 134 MMHG | WEIGHT: 120 LBS | RESPIRATION RATE: 16 BRPM | HEIGHT: 62 IN | OXYGEN SATURATION: 95 % | DIASTOLIC BLOOD PRESSURE: 75 MMHG | TEMPERATURE: 98.1 F

## 2024-09-27 LAB — COLONOSCOPY: NORMAL

## 2024-09-27 PROCEDURE — 250N000009 HC RX 250: Performed by: PHYSICIAN ASSISTANT

## 2024-09-27 PROCEDURE — 370N000017 HC ANESTHESIA TECHNICAL FEE, PER MIN: Performed by: STUDENT IN AN ORGANIZED HEALTH CARE EDUCATION/TRAINING PROGRAM

## 2024-09-27 PROCEDURE — 250N000011 HC RX IP 250 OP 636: Performed by: NURSE ANESTHETIST, CERTIFIED REGISTERED

## 2024-09-27 PROCEDURE — G0105 COLORECTAL SCRN; HI RISK IND: HCPCS | Performed by: STUDENT IN AN ORGANIZED HEALTH CARE EDUCATION/TRAINING PROGRAM

## 2024-09-27 PROCEDURE — 45378 DIAGNOSTIC COLONOSCOPY: CPT | Performed by: STUDENT IN AN ORGANIZED HEALTH CARE EDUCATION/TRAINING PROGRAM

## 2024-09-27 PROCEDURE — 258N000003 HC RX IP 258 OP 636: Performed by: PHYSICIAN ASSISTANT

## 2024-09-27 RX ORDER — LIDOCAINE 40 MG/G
CREAM TOPICAL
Status: DISCONTINUED | OUTPATIENT
Start: 2024-09-27 | End: 2024-09-27 | Stop reason: HOSPADM

## 2024-09-27 RX ORDER — NALOXONE HYDROCHLORIDE 0.4 MG/ML
0.4 INJECTION, SOLUTION INTRAMUSCULAR; INTRAVENOUS; SUBCUTANEOUS
Status: DISCONTINUED | OUTPATIENT
Start: 2024-09-27 | End: 2024-09-27 | Stop reason: HOSPADM

## 2024-09-27 RX ORDER — NALOXONE HYDROCHLORIDE 0.4 MG/ML
0.2 INJECTION, SOLUTION INTRAMUSCULAR; INTRAVENOUS; SUBCUTANEOUS
Status: DISCONTINUED | OUTPATIENT
Start: 2024-09-27 | End: 2024-09-27 | Stop reason: HOSPADM

## 2024-09-27 RX ORDER — SODIUM CHLORIDE, SODIUM LACTATE, POTASSIUM CHLORIDE, CALCIUM CHLORIDE 600; 310; 30; 20 MG/100ML; MG/100ML; MG/100ML; MG/100ML
INJECTION, SOLUTION INTRAVENOUS CONTINUOUS
Status: DISCONTINUED | OUTPATIENT
Start: 2024-09-27 | End: 2024-09-27 | Stop reason: HOSPADM

## 2024-09-27 RX ORDER — FLUMAZENIL 0.1 MG/ML
0.2 INJECTION, SOLUTION INTRAVENOUS
Status: DISCONTINUED | OUTPATIENT
Start: 2024-09-27 | End: 2024-09-27 | Stop reason: HOSPADM

## 2024-09-27 RX ORDER — PROPOFOL 10 MG/ML
INJECTION, EMULSION INTRAVENOUS PRN
Status: DISCONTINUED | OUTPATIENT
Start: 2024-09-27 | End: 2024-09-27

## 2024-09-27 RX ADMIN — PROPOFOL 50 MG: 10 INJECTION, EMULSION INTRAVENOUS at 07:45

## 2024-09-27 RX ADMIN — LIDOCAINE HYDROCHLORIDE 0.1 ML: 10 INJECTION, SOLUTION EPIDURAL; INFILTRATION; INTRACAUDAL; PERINEURAL at 06:59

## 2024-09-27 RX ADMIN — SODIUM CHLORIDE, POTASSIUM CHLORIDE, SODIUM LACTATE AND CALCIUM CHLORIDE 1000 ML: 600; 310; 30; 20 INJECTION, SOLUTION INTRAVENOUS at 06:59

## 2024-09-27 RX ADMIN — PROPOFOL 150 MG: 10 INJECTION, EMULSION INTRAVENOUS at 07:35

## 2024-09-27 RX ADMIN — PROPOFOL 150 MG: 10 INJECTION, EMULSION INTRAVENOUS at 07:33

## 2024-09-27 ASSESSMENT — ACTIVITIES OF DAILY LIVING (ADL)
ADLS_ACUITY_SCORE: 35
ADLS_ACUITY_SCORE: 35

## 2024-09-27 NOTE — H&P
[facility]    Pre-Endoscopy History and Physical     Dasia Orozco MRN# [id number]   YOB: 1955 Age: 69 year old     Date of Procedure: 2024  Primary care provider: Lakisha Flanagan  Type of Endoscopy: Colonoscopy with possible biopsy, possible polypectomy  Reason for Procedure: surveillance  Type of Anesthesia Anticipated: Conscious Sedation    HPI:    Dasia is a 69 year old female who will be undergoing the above procedure.      A history and physical has been performed. The patient's medications and allergies have been reviewed. The risks and benefits of the procedure and the sedation options and risks were discussed with the patient.  All questions were answered and informed consent was obtained.      She denies a personal or family history of anesthesia complications or bleeding disorders.     Colonoscopy, surveillance, last one  with polyps in descending and sigmoid. Also diverticulosis in sigmoid and ascending. no AC. ASA II. Surgical hx of cholecystectomy. No family hx of colon cancer.     Patient Active Problem List   Diagnosis    COPD (chronic obstructive pulmonary disease) (H)    Meniere's disease    Pulmonary nodule    Colon polyps    Anxiety    Eczema    Elevated cholesterol    Gastric erosion    Torticollis        Past Medical History:   Diagnosis Date    COPD (chronic obstructive pulmonary disease) (H)     Meniere's disease         Past Surgical History:   Procedure Laterality Date    CHOLECYSTECTOMY  2008    COLONOSCOPY N/A 2019    Procedure: COLONOSCOPY, WITH POLYPECTOMY AND BIOPSY;  Surgeon: Narciso Singh DO;  Location: [facility]       Social History     Tobacco Use    Smoking status: Former     Current packs/day: 0.00     Average packs/day: 0.5 packs/day for 40.0 years (20.0 ttl pk-yrs)     Types: Cigarettes     Start date: 1972     Quit date: 2012     Years since quittin.8    Smokeless tobacco: Never    Substance Use Topics    Alcohol use: Yes     Comment: very little       Family History   Problem Relation Age of Onset    Respiratory Father         emphysema    Alzheimer Disease Maternal Grandmother     Cancer Maternal Grandfather         pancreatic cancer    Cancer Paternal Grandfather         lung    Gastrointestinal Disease Sister         diverticulis    Alzheimer Disease Mother        Prior to Admission medications    Medication Sig Start Date End Date Taking? Authorizing Provider   acetaminophen (TYLENOL) 500 MG tablet    Yes Reported, Patient   albuterol (PROVENTIL HFA: VENTOLIN HFA) 108 (90 BASE) MCG/ACT inhaler Inhale 2 puffs into the lungs every 4 hours as needed for shortness of breath / dyspnea or wheezing Prior to exercise 3/26/20  Yes Coty Barrett APRN CNP   Ascorbic Acid (VITAMIN C PO) Take 1 tablet by mouth daily   Yes Reported, Patient   diclofenac (VOLTAREN) 1 % topical gel Place 2 g onto the skin 4 times daily 6/5/20  Yes Jareth Smith MD   ipratropium - albuterol 0.5 mg/2.5 mg/3 mL (DUONEB) 0.5-2.5 (3) MG/3ML neb solution Inhale 3 mLs into the lungs 12/27/22  Yes Reported, Patient   LORazepam (ATIVAN) 1 MG tablet Take 0.5 mg by mouth Up to 3 times daily. 5/4/12  Yes Coty Barrett APRN CNP   meclizine (ANTIVERT) 25 MG tablet Take 0.5 tablets (12.5 mg) by mouth daily 5/19/14  Yes Coty Barrett APRN CNP   Multiple Vitamin (MULTI-VITAMIN) per tablet Take 1 tablet by mouth daily. 5/4/12  Yes Coty Barrett APRN CNP   omeprazole (PRILOSEC) 20 MG DR capsule TAKE 1 CAPSULE (20 MG) BY MOUTH DAILY 1 HOUR BEFORE A MEAL. 4/16/24  Yes Reported, Patient   probiotic CAPS Take 1 capsule by mouth every evening   Yes Reported, Patient   QVAR REDIHALER 40 MCG/ACT inhaler INHALE 2 PUFFS (80 MCG) TWO TIMES A DAY.   Yes Reported, Patient   tiotropium (SPIRIVA HANDIHALER) 18 MCG inhaled capsule Inhale 1 capsule into the lungs daily Using handihaler device 5/26/20  Yes  "Reported, Patient   Glucosamine HCl (GLUCOSAMINE PO) Take 1 Dose by mouth daily Liquid    Reported, Patient   ibuprofen (ADVIL/MOTRIN) 200 MG tablet Take 400 mg by mouth every 4 hours as needed     Reported, Patient   predniSONE (DELTASONE) 20 MG tablet Take two tablets (= 40mg) each day for 5 (five) days 12/26/22   Thanh Vyas MD       Allergies   Allergen Reactions    Fluticasone-Salmeterol Shortness Of Breath     Hypoxia      Amoxicillin-Pot Clavulanate Nausea and Vomiting     Vertigo    Doxycycline Headache and Nausea    Fumaric Acid Itching    Morphine Itching    Penicillins Other (See Comments)     vertigo     Sulfa Antibiotics Headache               REVIEW OF SYSTEMS:   5 point ROS negative except as noted above in HPI, including Gen., Resp., CV, GI &  system review.    PHYSICAL EXAM:   BP (!) 154/84 (BP Location: Right arm)   Pulse 79   Temp 98  F (36.7  C) (Oral)   Resp 16   Ht 1.575 m (5' 2\")   Wt 54.4 kg (120 lb)   LMP 09/11/2001   SpO2 98%   BMI 21.95 kg/m   Estimated body mass index is 21.95 kg/m  as calculated from the following:    Height as of this encounter: 1.575 m (5' 2\").    Weight as of this encounter: 54.4 kg (120 lb).   Constitutional: Awake, alert, no acute distress.  Eyes: No scleral icterus.  Conjunctiva are without injection.  ENMT: Mucous membranes moist, dentition and gums are intact.   Neck: Soft, supple, trachea midline.    Endocrine: n/a   Lymphatic: There is no cervical, submandibularadenopathy.  Respiratory: normal efforts on room air  Cardiovascular: extremities warm and well perfused  Abdomen: Non-distended, non-tender,  No masses,  Musculoskeletal: Full range of motion in the upper and lower extremities.    Skin: No skin rashes or lesions to inspection.  No petechia.    Neurologic: alerted and oriented 3x  Psychiatric: The patient's affect is not blunted and mood is appropriate.  DIAGNOSTICS:    Not indicated    IMPRESSION   ASA Class 2 - Mild systemic " disease    PLAN:   Plan for Colonoscopy with possible biopsy, possible polypectomy. We discussed the risks, benefits and alternatives and the patient wished to proceed.  Patient is cleared for the above procedure.    The above has been forwarded to the consulting provider.    Enrique Kevin MD on 9/27/2024 at 7:13 AM  [facility]

## 2024-09-27 NOTE — ANESTHESIA CARE TRANSFER NOTE
Patient: Dasia Orozco    Procedure: Procedure(s):  Colonoscopy       Diagnosis: History of colonic polyps [Z86.010]  Diagnosis Additional Information: No value filed.    Anesthesia Type:   General     Note:    Oropharynx: oropharynx clear of all foreign objects and spontaneously breathing  Level of Consciousness: awake  Oxygen Supplementation: room air    Independent Airway: airway patency satisfactory and stable  Dentition: dentition unchanged  Vital Signs Stable: post-procedure vital signs reviewed and stable  Report to RN Given: handoff report given  Patient transferred to: Phase II    Handoff Report: Identifed the Patient, Identified the Reponsible Provider, Reviewed the pertinent medical history, Discussed the surgical course, Reviewed Intra-OP anesthesia mangement and issues during anesthesia, Set expectations for post-procedure period and Allowed opportunity for questions and acknowledgement of understanding      Vitals:  Vitals Value Taken Time   BP     Temp     Pulse     Resp     SpO2         Electronically Signed By: PORTER Tenorio CRNA  September 27, 2024  7:55 AM

## 2024-09-27 NOTE — ANESTHESIA POSTPROCEDURE EVALUATION
Patient: Dasia Orozco    Procedure: Procedure(s):  Colonoscopy       Anesthesia Type:  General    Note:  Disposition: Outpatient   Postop Pain Control: Uneventful            Sign Out: Well controlled pain   PONV: No   Neuro/Psych: Uneventful            Sign Out: Acceptable/Baseline neuro status   Airway/Respiratory: Uneventful            Sign Out: Acceptable/Baseline resp. status   CV/Hemodynamics: Uneventful            Sign Out: Acceptable CV status; No obvious hypovolemia; No obvious fluid overload   Other NRE: NONE   DID A NON-ROUTINE EVENT OCCUR? No           Last vitals:  Vitals Value Taken Time   /68 09/27/24 0800   Temp 36.7  C (98.1  F) 09/27/24 0757   Pulse 75 09/27/24 0800   Resp 16 09/27/24 0757   SpO2 97 % 09/27/24 0807   Vitals shown include unfiled device data.    Electronically Signed By: PORTER Tenorio CRNA  September 27, 2024  8:08 AM

## 2024-10-02 ENCOUNTER — HOSPITAL ENCOUNTER (OUTPATIENT)
Dept: MAMMOGRAPHY | Facility: CLINIC | Age: 69
Discharge: HOME | End: 2024-10-02
Attending: FAMILY MEDICINE | Admitting: FAMILY MEDICINE
Payer: COMMERCIAL

## 2024-10-02 DIAGNOSIS — Z12.31 VISIT FOR SCREENING MAMMOGRAM: ICD-10-CM

## 2024-10-02 PROCEDURE — 77063 BREAST TOMOSYNTHESIS BI: CPT

## 2024-10-31 ENCOUNTER — OFFICE VISIT (OUTPATIENT)
Dept: NEUROLOGY | Facility: CLINIC | Age: 69
End: 2024-10-31
Payer: COMMERCIAL

## 2024-10-31 VITALS — SYSTOLIC BLOOD PRESSURE: 121 MMHG | HEART RATE: 82 BPM | DIASTOLIC BLOOD PRESSURE: 78 MMHG

## 2024-10-31 DIAGNOSIS — G24.3 CERVICAL DYSTONIA: Primary | ICD-10-CM

## 2024-10-31 RX ORDER — BACLOFEN 10 MG/1
10 TABLET ORAL 3 TIMES DAILY PRN
Qty: 90 TABLET | Refills: 3 | Status: SHIPPED | OUTPATIENT
Start: 2024-10-31

## 2024-10-31 NOTE — LETTER
dystonia s/p Botox injections.  She came in today for evaluation of side effects after her botulinum toxin injections.  She does appear to have a notable head droop that I do suspect timeline lives is consistent with the botulinum toxin injections being too strong although this was a relatively low dose of 80 units.  She is amenable to continuing to try another injection, in which case we will decrease the trapezius and splenius injections, if not forego them together, and perhaps focus on more lateral muscles.  I did provide her with a referral for physical therapy to help with rebuilding this strength and a prescription for baclofen to take as needed for muscle spasms.  We discussed over-the-counter options including acetaminophen and either naproxen or ibuprofen (not both simultaneously).  I suspect that this will continue to improve as the botulinum toxin wears off and with any medical therapy.    PLAN:  -Physical therapy referral  - As needed baclofen ordered  - Discussed over-the-counter options such as acetaminophen, NSAIDs, massages, and heat packs    RTC already scheduled on 12/9/2024      Lizeth Shaikh MD    [facility]  Department of Neurology       40 minutes spent on the date of the encounter doing chart review, history and exam, documentation and further activities as noted above    Again, thank you for allowing me to participate in the care of your patient.        Sincerely,        Lizeth Shaikh MD

## 2024-10-31 NOTE — NURSING NOTE
Chief Complaint   Patient presents with    Follow Up     Return       Humaira Hudson MA on 10/31/2024 at 12:00 PM

## 2024-10-31 NOTE — Clinical Note
10/31/2024      Dasia Orozco  [address]  [facility] [place], [place] [address]      Dear Colleague,    Thank you for referring your patient, Dasia Orozco, to the [facility]. Please see a copy of my visit note below.    Department of Neurology  Movement Disorders Division   Follow-up Note    Patient: Dasia Orozco   MRN: [id number]   : 1955   Date of Visit: 10/31/2024    Ms. Rodolfo Orozco is a 69 year old female who presents for follow-up of cervical dystonia s/p Botox injections. She was last seen on 24, at which time she underwent injections with slight increase in the trapezius. Today, she is accompanied by ***.    INTERVAL EVENTS:  Since last visit, she reports ***    ***  - ***      MEDICATIONS reviewed & pertinent for:  As needed baclofen  Botox injections - 80 units on 24    ROS:  All others negative except as listed above.    Current Outpatient Medications   Medication Sig Dispense Refill     acetaminophen (TYLENOL) 500 MG tablet        albuterol (PROVENTIL HFA: VENTOLIN HFA) 108 (90 BASE) MCG/ACT inhaler Inhale 2 puffs into the lungs every 4 hours as needed for shortness of breath / dyspnea or wheezing Prior to exercise 3 Inhaler 1     Ascorbic Acid (VITAMIN C PO) Take 1 tablet by mouth daily       diclofenac (VOLTAREN) 1 % topical gel Place 2 g onto the skin 4 times daily 1 Tube 11     ipratropium - albuterol 0.5 mg/2.5 mg/3 mL (DUONEB) 0.5-2.5 (3) MG/3ML neb solution Inhale 3 mLs into the lungs       LORazepam (ATIVAN) 1 MG tablet Take 0.5 mg by mouth Up to 3 times daily. 4 tablet 0     meclizine (ANTIVERT) 25 MG tablet Take 0.5 tablets (12.5 mg) by mouth daily 90 tablet      Multiple Vitamin (MULTI-VITAMIN) per tablet Take 1 tablet by mouth daily. 100 tablet 3     omeprazole (PRILOSEC) 20 MG DR capsule TAKE 1 CAPSULE (20 MG) BY MOUTH DAILY 1 HOUR BEFORE A MEAL.       probiotic CAPS Take 1 capsule by mouth every evening        QVAR REDIHALER 40 MCG/ACT inhaler INHALE 2 PUFFS (80 MCG) TWO TIMES A DAY.       tiotropium (SPIRIVA HANDIHALER) 18 MCG inhaled capsule Inhale 1 capsule into the lungs daily Using handihaler device       Glucosamine HCl (GLUCOSAMINE PO) Take 1 Dose by mouth daily Liquid       ibuprofen (ADVIL/MOTRIN) 200 MG tablet Take 400 mg by mouth every 4 hours as needed        predniSONE (DELTASONE) 20 MG tablet Take two tablets (= 40mg) each day for 5 (five) days 10 tablet 0       Allergies   Allergen Reactions     Fluticasone-Salmeterol Shortness Of Breath     Hypoxia       Amoxicillin-Pot Clavulanate Nausea and Vomiting     Vertigo     Doxycycline Headache and Nausea     Fumaric Acid Itching     Morphine Itching     Penicillins Other (See Comments)     vertigo      Sulfa Antibiotics Headache               Family History   Problem Relation Age of Onset     Respiratory Father         emphysema     Alzheimer Disease Maternal Grandmother      Cancer Maternal Grandfather         pancreatic cancer     Cancer Paternal Grandfather         lung     Gastrointestinal Disease Sister         diverticulis     Alzheimer Disease Mother         Social History     Socioeconomic History     Marital status: Single     Spouse name: Not on file     Number of children: Not on file     Years of education: Not on file     Highest education level: Not on file   Occupational History     Not on file   Tobacco Use     Smoking status: Former     Current packs/day: 0.00     Average packs/day: 0.5 packs/day for 40.0 years (20.0 ttl pk-yrs)     Types: Cigarettes     Start date: 1972     Quit date: 2012     Years since quittin.9     Smokeless tobacco: Never   Substance and Sexual Activity     Alcohol use: Yes     Comment: very little     Drug use: No     Sexual activity: Yes     Partners: Male   Other Topics Concern     Parent/sibling w/ CABG, MI or angioplasty before 65F 55M? No   Social History Narrative    Works as PCA. Divorsed      Social Drivers of Health     Financial Resource Strain: Not on file   Food Insecurity: Not on file   Transportation Needs: Not on file   Physical Activity: Not on file   Stress: Not on file   Social Connections: Not on file   Interpersonal Safety: Low Risk  (9/27/2024)    Interpersonal Safety      Do you feel physically and emotionally safe where you currently live?: Yes      Within the past 12 months, have you been hit, slapped, kicked or otherwise physically hurt by someone?: No      Within the past 12 months, have you been humiliated or emotionally abused in other ways by your partner or ex-partner?: No   Housing Stability: Not on file        PHYSICAL EXAM:  /78 (BP Location: Right arm, Patient Position: Sitting)   Pulse 82   LMP 09/11/2001      ***  Mild R torticollis, R shoulder elevation, and slight R laterocollis.     DATA REVIEWED:  Muscles Injected Units Injected Number of Injections   R splenius 15 (15) 1   R trapezius 20, 10 (15, 15) 2   R levator 10 (10) 1           L SCM  15 (15) 1   L trapezius 10 (10) 1           Total Units Injected: 80     Unavoidable Waste: 20     Total Units Billed 100       ASSESSMENT:  ***    PLAN:  ***      Lizeth Shaikh MD    [facility]  Department of Neurology       *** minutes spent on the date of the encounter doing chart review, history and exam, documentation and further activities as noted above    ***  Injections were initially fine. Then she started develop the tightening on both sides in the back of the neck. In the past 2-3 weeks (injections were 1.5 months ago), it has been particularly bad.     Splenius is low           Again, thank you for allowing me to participate in the care of your patient.        Sincerely,        Lizeth Shaikh MD

## 2024-10-31 NOTE — PROGRESS NOTES
Department of Neurology  Movement Disorders Division   Follow-up Note    Patient: Dasia Orozco   MRN: [id number]   : 1955   Date of Visit: 10/31/2024    Ms. Rodolfo Orozco is a 69 year old female who presents for follow-up of cervical dystonia s/p Botox injections. She was last seen on 24, at which time she underwent injections with slight increase in the trapezius. Today, she is accompanied by ***.    INTERVAL EVENTS:  Since last visit, she reports ***    ***  - ***      MEDICATIONS reviewed & pertinent for:  As needed baclofen  Botox injections - 80 units on 24    ROS:  All others negative except as listed above.    Current Outpatient Medications   Medication Sig Dispense Refill    acetaminophen (TYLENOL) 500 MG tablet       albuterol (PROVENTIL HFA: VENTOLIN HFA) 108 (90 BASE) MCG/ACT inhaler Inhale 2 puffs into the lungs every 4 hours as needed for shortness of breath / dyspnea or wheezing Prior to exercise 3 Inhaler 1    Ascorbic Acid (VITAMIN C PO) Take 1 tablet by mouth daily      diclofenac (VOLTAREN) 1 % topical gel Place 2 g onto the skin 4 times daily 1 Tube 11    ipratropium - albuterol 0.5 mg/2.5 mg/3 mL (DUONEB) 0.5-2.5 (3) MG/3ML neb solution Inhale 3 mLs into the lungs      LORazepam (ATIVAN) 1 MG tablet Take 0.5 mg by mouth Up to 3 times daily. 4 tablet 0    meclizine (ANTIVERT) 25 MG tablet Take 0.5 tablets (12.5 mg) by mouth daily 90 tablet     Multiple Vitamin (MULTI-VITAMIN) per tablet Take 1 tablet by mouth daily. 100 tablet 3    omeprazole (PRILOSEC) 20 MG DR capsule TAKE 1 CAPSULE (20 MG) BY MOUTH DAILY 1 HOUR BEFORE A MEAL.      probiotic CAPS Take 1 capsule by mouth every evening      QVAR REDIHALER 40 MCG/ACT inhaler INHALE 2 PUFFS (80 MCG) TWO TIMES A DAY.      tiotropium (SPIRIVA HANDIHALER) 18 MCG inhaled capsule Inhale 1 capsule into the lungs daily Using handihaler device      Glucosamine HCl (GLUCOSAMINE PO) Take 1 Dose by mouth daily Liquid      ibuprofen  (ADVIL/MOTRIN) 200 MG tablet Take 400 mg by mouth every 4 hours as needed       predniSONE (DELTASONE) 20 MG tablet Take two tablets (= 40mg) each day for 5 (five) days 10 tablet 0       Allergies   Allergen Reactions    Fluticasone-Salmeterol Shortness Of Breath     Hypoxia      Amoxicillin-Pot Clavulanate Nausea and Vomiting     Vertigo    Doxycycline Headache and Nausea    Fumaric Acid Itching    Morphine Itching    Penicillins Other (See Comments)     vertigo     Sulfa Antibiotics Headache               Family History   Problem Relation Age of Onset    Respiratory Father         emphysema    Alzheimer Disease Maternal Grandmother     Cancer Maternal Grandfather         pancreatic cancer    Cancer Paternal Grandfather         lung    Gastrointestinal Disease Sister         diverticulis    Alzheimer Disease Mother         Social History     Socioeconomic History    Marital status: Single     Spouse name: Not on file    Number of children: Not on file    Years of education: Not on file    Highest education level: Not on file   Occupational History    Not on file   Tobacco Use    Smoking status: Former     Current packs/day: 0.00     Average packs/day: 0.5 packs/day for 40.0 years (20.0 ttl pk-yrs)     Types: Cigarettes     Start date: 1972     Quit date: 2012     Years since quittin.9    Smokeless tobacco: Never   Substance and Sexual Activity    Alcohol use: Yes     Comment: very little    Drug use: No    Sexual activity: Yes     Partners: Male   Other Topics Concern    Parent/sibling w/ CABG, MI or angioplasty before 65F 55M? No   Social History Narrative    Works as PCA. Divorsed     Social Drivers of Health     Financial Resource Strain: Not on file   Food Insecurity: Not on file   Transportation Needs: Not on file   Physical Activity: Not on file   Stress: Not on file   Social Connections: Not on file   Interpersonal Safety: Low Risk  (2024)    Interpersonal Safety     Do you feel  physically and emotionally safe where you currently live?: Yes     Within the past 12 months, have you been hit, slapped, kicked or otherwise physically hurt by someone?: No     Within the past 12 months, have you been humiliated or emotionally abused in other ways by your partner or ex-partner?: No   Housing Stability: Not on file        PHYSICAL EXAM:  /78 (BP Location: Right arm, Patient Position: Sitting)   Pulse 82   LMP 09/11/2001      ***  Mild R torticollis, R shoulder elevation, and slight R laterocollis.     DATA REVIEWED:  Muscles Injected Units Injected Number of Injections   R splenius 15 (15) 1   R trapezius 20, 10 (15, 15) 2   R levator 10 (10) 1           L SCM  15 (15) 1   L trapezius 10 (10) 1           Total Units Injected: 80     Unavoidable Waste: 20     Total Units Billed 100       ASSESSMENT:  ***    PLAN:  ***      Lizeth Shaikh MD    [facility]  Department of Neurology       *** minutes spent on the date of the encounter doing chart review, history and exam, documentation and further activities as noted above   units.  She is amenable to continuing to try another injection, in which case we will decrease the trapezius and splenius injections, if not forego them together, and perhaps focus on more lateral muscles.  I did provide her with a referral for physical therapy to help with rebuilding this strength and a prescription for baclofen to take as needed for muscle spasms.  We discussed over-the-counter options including acetaminophen and either naproxen or ibuprofen (not both simultaneously).  I suspect that this will continue to improve as the botulinum toxin wears off and with any medical therapy.    PLAN:  -Physical therapy referral  - As needed baclofen ordered  - Discussed over-the-counter options such as acetaminophen, NSAIDs, massages, and heat packs    RTC already scheduled on 12/9/2024      Lizeth Shaikh MD    [facility]  Department of Neurology       40 minutes spent on the date of the encounter doing chart review, history and exam, documentation and further activities as noted above

## 2024-10-31 NOTE — PATIENT INSTRUCTIONS
You can take Tylenol 1000 mg 3 times per day safely. You can take up to 4000 mg per day safely.    Naproxen or ibuprofen can help with the pain and also any inflammation. You can take this 2-3 times per day. If you're still taking this regularly in 3-4 months, we will check your kidneys just to make sure there's no issues but your labs earlier this year showed no concern for kidney issues. If you get an upset stomach, take with food.     I put in a new prescription for baclofen to take 1/2 to 1 tablet up to 3 times a day.    You will get called to schedule physical therapy.     Massages and heat packs can help as well.     This should get better in the next 1-2 months. We still have an appointment on 12/9/24.

## 2024-10-31 NOTE — PROGRESS NOTES
***  Injections were initially fine. Then she started develop the tightening on both sides in the back of the neck. In the past 2-3 weeks (injections were 1.5 months ago), it has been particularly bad.     Splenius is low

## 2024-12-08 ENCOUNTER — HOSPITAL ENCOUNTER (EMERGENCY)
Facility: CLINIC | Age: 69
Discharge: HOME | End: 2024-12-08
Attending: EMERGENCY MEDICINE | Admitting: EMERGENCY MEDICINE
Payer: COMMERCIAL

## 2024-12-08 VITALS
BODY MASS INDEX: 21.95 KG/M2 | SYSTOLIC BLOOD PRESSURE: 149 MMHG | RESPIRATION RATE: 18 BRPM | DIASTOLIC BLOOD PRESSURE: 93 MMHG | HEART RATE: 87 BPM | OXYGEN SATURATION: 96 % | TEMPERATURE: 97.8 F | WEIGHT: 120 LBS

## 2024-12-08 DIAGNOSIS — S05.01XA ABRASION OF RIGHT CORNEA, INITIAL ENCOUNTER: ICD-10-CM

## 2024-12-08 PROCEDURE — 250N000009 HC RX 250: Performed by: EMERGENCY MEDICINE

## 2024-12-08 PROCEDURE — 99283 EMERGENCY DEPT VISIT LOW MDM: CPT | Performed by: EMERGENCY MEDICINE

## 2024-12-08 RX ORDER — TETRACAINE HYDROCHLORIDE 5 MG/ML
1-2 SOLUTION OPHTHALMIC ONCE
Status: COMPLETED | OUTPATIENT
Start: 2024-12-08 | End: 2024-12-08

## 2024-12-08 RX ORDER — POLYMYXIN B SULFATE AND TRIMETHOPRIM 1; 10000 MG/ML; [USP'U]/ML
1-2 SOLUTION OPHTHALMIC EVERY 4 HOURS
Qty: 10 ML | Refills: 0 | Status: SHIPPED | OUTPATIENT
Start: 2024-12-08 | End: 2024-12-13

## 2024-12-08 RX ADMIN — TETRACAINE HYDROCHLORIDE 1 DROP: 5 SOLUTION OPHTHALMIC at 15:28

## 2024-12-08 RX ADMIN — TETRACAINE HYDROCHLORIDE 2 DROP: 5 SOLUTION OPHTHALMIC at 14:57

## 2024-12-08 ASSESSMENT — COLUMBIA-SUICIDE SEVERITY RATING SCALE - C-SSRS
6. HAVE YOU EVER DONE ANYTHING, STARTED TO DO ANYTHING, OR PREPARED TO DO ANYTHING TO END YOUR LIFE?: NO
1. IN THE PAST MONTH, HAVE YOU WISHED YOU WERE DEAD OR WISHED YOU COULD GO TO SLEEP AND NOT WAKE UP?: NO
2. HAVE YOU ACTUALLY HAD ANY THOUGHTS OF KILLING YOURSELF IN THE PAST MONTH?: NO

## 2024-12-08 ASSESSMENT — ACTIVITIES OF DAILY LIVING (ADL): ADLS_ACUITY_SCORE: 41

## 2024-12-08 NOTE — DISCHARGE INSTRUCTIONS
Use eyedrops as prescribed.    Follow-up with eye clinic if not improved.    Return to be seen if worsening symptoms.

## 2024-12-08 NOTE — PROGRESS NOTES
Movement Disorders Botulinum Toxin Clinic Note    Chief Complaint: Cervical Dystonia    History of Present Illness:  Dasia Orozco is a 69 year old female who presents to clinic for botulinum toxin injections for treatment of cervical dystonia. Neurotoxin injections are first line gold standard treatment for focal dystonia.  Today is her third appointment at [place] for injections, although she has had them many years ago.       Response to Last Injection: 9/9/2024    Onset of Effect:  ~3wks    Benefit from last injections:  Improved turning and head tremor, felt she was able to read, use her phone, and drive more effectively and with less discomfort. This was a bit complicated by the head droop (see below)    Wearing Off: After ~1.5mo    Side effects: Again reporting neck/shoulder pain and difficulty keeping head up. This occurred with the last set of injections but last time occurred ~2wks after injections and this time occurred ~1mo after injections. Was using a Tylenol and cold pack to good effect. Unclear if this discomfort is related to Botox as the patient also has multiple degenerative changes in her neck per an MRI C-spine from 7/2024. The patient was seen in this clinic for this issue on 10/31/2024 during which time exam revealed notable weakness with holding her head up and decreased muscle tension in the R > L splenius with increased tension in the bilateral trapezius despite her prior injections having increased the Botox dose for the R trapezius. Still, her exam and timeline was suggestive of Botox-induced weakness with reactive stress on other muscles and so may consider either reducing her dosage of the splenius and trapezius muscles or foregoing them altogether. A PT referral was also provided as was a prescription for Baclofen. Today, the patient reports that the head droop wore off around 1wk after her October appointment    Additional Interval History:   None    Current Outpatient  Medications   Medication Sig Dispense Refill    acetaminophen (TYLENOL) 500 MG tablet       albuterol (PROVENTIL HFA: VENTOLIN HFA) 108 (90 BASE) MCG/ACT inhaler Inhale 2 puffs into the lungs every 4 hours as needed for shortness of breath / dyspnea or wheezing Prior to exercise 3 Inhaler 1    Ascorbic Acid (VITAMIN C PO) Take 1 tablet by mouth daily      baclofen (LIORESAL) 10 MG tablet Take 1 tablet (10 mg) by mouth 3 times daily as needed for muscle spasms. 90 tablet 3    diclofenac (VOLTAREN) 1 % topical gel Place 2 g onto the skin 4 times daily 1 Tube 11    Glucosamine HCl (GLUCOSAMINE PO) Take 1 Dose by mouth daily Liquid      ibuprofen (ADVIL/MOTRIN) 200 MG tablet Take 400 mg by mouth every 4 hours as needed       ipratropium - albuterol 0.5 mg/2.5 mg/3 mL (DUONEB) 0.5-2.5 (3) MG/3ML neb solution Inhale 3 mLs into the lungs      LORazepam (ATIVAN) 1 MG tablet Take 0.5 mg by mouth Up to 3 times daily. 4 tablet 0    meclizine (ANTIVERT) 25 MG tablet Take 0.5 tablets (12.5 mg) by mouth daily 90 tablet     Multiple Vitamin (MULTI-VITAMIN) per tablet Take 1 tablet by mouth daily. 100 tablet 3    omeprazole (PRILOSEC) 20 MG DR capsule TAKE 1 CAPSULE (20 MG) BY MOUTH DAILY 1 HOUR BEFORE A MEAL.      polymixin b-trimethoprim (POLYTRIM) 32141-4.1 UNIT/ML-% ophthalmic solution Place 1-2 drops into the right eye every 4 hours for 5 days. 10 mL 0    predniSONE (DELTASONE) 20 MG tablet Take two tablets (= 40mg) each day for 5 (five) days 10 tablet 0    probiotic CAPS Take 1 capsule by mouth every evening      QVAR REDIHALER 40 MCG/ACT inhaler INHALE 2 PUFFS (80 MCG) TWO TIMES A DAY.      tiotropium (SPIRIVA HANDIHALER) 18 MCG inhaled capsule Inhale 1 capsule into the lungs daily Using handihaler device         Allergies: She is allergic to fluticasone-salmeterol, amoxicillin-pot clavulanate, doxycycline, fumaric acid, morphine, penicillins, and sulfa antibiotics.      Physical Examination:  Vital Signs:   vitals were not  taken for this visit.   Mild L torticollis, R shoulder elevation  Extremely subtle head tremor    BOTULINUM NEUROTOXIN INJECTION PROCEDURES:    VERIFICATION OF PATIENT IDENTIFICATION AND PROCEDURE     Initials   Patient Name KG   Patient  KG   Procedure Verified by: KG     Above assessments performed by:  Dr. Lizeth Shaikh      INDICATION/S FOR PROCEDURE/S:  Dasia Orozco is a 69 year old patient with dystonia affecting the  head, neck and shoulder girdle musculature secondary to a diagnosis of cervical dystonia with associated  spasms, loss of volitional motor control, and difficulty with activities of daily living.     Her baseline symptoms have been recalcitrant to oral medications and conservative therapy.  She is here today for an injection of Botox.      GOAL OF PROCEDURE:  The goal of this procedure is to increase active range of motion, improve volitional motor control, and enhance functional independence associated with dystonic movements.    TOTAL DOSE ADMINISTERED:  Dose Administered:  50 units Botox    Diluent Used:  Preservative Free Normal Saline  Total Volume of Diluent Used:  1mL/100units    CONSENT:  The risks, benefits, and treatment options were discussed with Dasia Orozco and she agreed to proceed.      Written consent was obtained by MB    EQUIPMENT USED:  Needle-37mm stimulating/recording    SKIN PREPARATION:  Skin preparation was performed using an alcohol wipe.    GUIDANCE DESCRIPTION:  Electro-myographic guidance was necessary throughout the procedure to accurately identify all areas of dystonic muscles while avoiding injection of non-dystonic muscles, neighboring nerves and nearby vascular structures.     AREA/MUSCLE INJECTED:    Visual display of locations injections are scanned into the chart under MEDIA tab.         Muscles Injected Units Injected Number of Injections   R splenius 0 (15) 0   R trapezius 0 (20, 10) 0   R levator  20 (10) 1   R SCM, clavicular head  10 (0) 1           L SCM, clavicular head + insertion point  10, 10 (15) 2   L trapezius  0 (10) 0           Total Units Injected: 50     Unavoidable Waste: 50     Total Units Billed 100      The patient tolerated the injections without difficulty.       Assessment:    Dasia Orozco is a 69 year old female with cervical dystonia. Today we did repeat botulinum toxin injections but this time avoided any of the posterior neck muscles and added on the L SCM    Plan  - Follow-up in 3 and 6 months' time to consider repeat injections    This patient was seen with Movement Disorder Specialist, Dr Shaikh, who agrees with the above plan    Ilsa Boyer MD  Movement Disorder Fellow

## 2024-12-08 NOTE — ED PROVIDER NOTES
ED Provider Note  [facility]      History     Chief Complaint   Patient presents with    Foreign Body in Eye     HPI  Dasia Orozco is a 69 year old female who presents to the emergency department with pain, in addition to mattering of the right eye.  This began yesterday evening when she was attempting to sleep.  She did go to a [employer] ceremony yesterday evening, and there were some bonfires, however no notable time when patient noticed something in her eye.  Does not wear contact lenses.  Immunizations are thought to be up-to-date.  No injury elsewhere.  Tried over-the-counter drops without relief.      Independent Historian:        Review of External Notes:          Allergies:  Allergies   Allergen Reactions    Fluticasone-Salmeterol Shortness Of Breath     Hypoxia      Amoxicillin-Pot Clavulanate Nausea and Vomiting     Vertigo    Doxycycline Headache and Nausea    Fumaric Acid Itching    Morphine Itching    Penicillins Other (See Comments)     vertigo     Sulfa Antibiotics Headache              Problem List:    Patient Active Problem List    Diagnosis Date Noted    Colon polyps 09/03/2019     Priority: Medium     Tubular adenoma and hyperplastic polyps      Pulmonary nodule 01/21/2015     Priority: Medium    COPD (chronic obstructive pulmonary disease) (H) 02/27/2013     Priority: Medium    Elevated cholesterol 11/27/2012     Priority: Medium    Gastric erosion 10/16/2008     Priority: Medium    Eczema 10/11/2007     Priority: Medium    Meniere's disease 09/07/2007     Priority: Medium    Anxiety 09/07/2007     Priority: Medium    Torticollis 09/07/2007     Priority: Medium        Past Medical History:    Past Medical History:   Diagnosis Date    COPD (chronic obstructive pulmonary disease) (H)     Meniere's disease        Past Surgical History:    Past Surgical History:   Procedure Laterality Date    CHOLECYSTECTOMY  01/01/2008    COLONOSCOPY N/A 08/16/2019     Procedure: COLONOSCOPY, WITH POLYPECTOMY AND BIOPSY;  Surgeon: Narciso Singh DO;  Location: [facility]    COLONOSCOPY N/A 2024    Procedure: Colonoscopy;  Surgeon: Enrique Kevin MD;  Location: [facility]       Family History:    Family History   Problem Relation Age of Onset    Respiratory Father         emphysema    Alzheimer Disease Maternal Grandmother     Cancer Maternal Grandfather         pancreatic cancer    Cancer Paternal Grandfather         lung    Gastrointestinal Disease Sister         diverticulis    Alzheimer Disease Mother        Social History:  Marital Status:  Single [1]  Social History     Tobacco Use    Smoking status: Former     Current packs/day: 0.00     Average packs/day: 0.5 packs/day for 40.0 years (20.0 ttl pk-yrs)     Types: Cigarettes     Start date: 1972     Quit date: 2012     Years since quittin.0    Smokeless tobacco: Never   Substance Use Topics    Alcohol use: Yes     Comment: very little    Drug use: No        Medications:    acetaminophen (TYLENOL) 500 MG tablet  albuterol (PROVENTIL HFA: VENTOLIN HFA) 108 (90 BASE) MCG/ACT inhaler  Ascorbic Acid (VITAMIN C PO)  baclofen (LIORESAL) 10 MG tablet  diclofenac (VOLTAREN) 1 % topical gel  Glucosamine HCl (GLUCOSAMINE PO)  ibuprofen (ADVIL/MOTRIN) 200 MG tablet  ipratropium - albuterol 0.5 mg/2.5 mg/3 mL (DUONEB) 0.5-2.5 (3) MG/3ML neb solution  LORazepam (ATIVAN) 1 MG tablet  meclizine (ANTIVERT) 25 MG tablet  Multiple Vitamin (MULTI-VITAMIN) per tablet  omeprazole (PRILOSEC) 20 MG DR capsule  polymixin b-trimethoprim (POLYTRIM) 20821-4.1 UNIT/ML-% ophthalmic solution  predniSONE (DELTASONE) 20 MG tablet  probiotic CAPS  QVAR REDIHALER 40 MCG/ACT inhaler  tiotropium (SPIRIVA HANDIHALER) 18 MCG inhaled capsule          Review of Systems  A medically appropriate review of systems was performed with pertinent positives and negatives noted in the HPI, and all other systems negative.    Physical Exam   Patient  Vitals for the past 24 hrs:   BP Temp Temp src Pulse Resp SpO2 Weight   12/08/24 1449 (!) 149/93 97.8  F (36.6  C) Oral 87 18 96 % 54.4 kg (120 lb)          Physical Exam  General: alert and in mild acute distress on arrival  Head: atraumatic, normocephalic  Eyes: Pupils equally round and reactive to light bilaterally.  Right eye is injected.  Slit-lamp examination performed, with lid eversion also performed.  No evidence of foreign body.  Does have corneal abrasion present at the 7 o'clock position of the right eye.  Lungs:  nonlabored  CV:  extremities warm and perfused  Abd: nondistended  Skin: no rashes, no diaphoresis and skin color normal  Neuro: Patient awake, alert, speech is fluent,   Psychiatric: affect/mood normal,        ED Course                 Procedures                        No results found for this or any previous visit (from the past 24 hours).    MEDICATIONS GIVEN IN THE EMERGENCY DEPARTMENT:  Medications   tetracaine (PONTOCAINE) 0.5 % ophthalmic solution 1-2 drop (has no administration in time range)   tetracaine (PONTOCAINE) 0.5 % ophthalmic solution 1-2 drop (2 drops Right Eye $Given 12/8/24 1457)           Independent Interpretation (X-rays, CTs, rhythm strip):  None    Consultations/Discussion of Management or Tests:         Social Determinants of Health affecting care:         Assessments & Plan (with Medical Decision Making)  69 year old female who presents to the Emergency Department for evaluation of right eye pain.  Patient was at [employer] yesterday evening.  However, no specific foreign body time when patient had noted any injury to the right eye.  Slit-lamp examination performed as above.  No evidence of foreign body.  However, does have fluorescein uptake on slit-lamp examination, consistent with corneal abrasion.  Will prescrib Polytrim eyedrops.  Follow-up recommended in eye clinic if not improved.  Return precautions discussed.  Discussed tetanus status, and I am  unable to view when patient's last tetanus immunization was received, and therefore encourage patient to follow-up in clinic for review of immunizations.       I have reviewed the nursing notes.    I have reviewed the findings, diagnosis, plan and need for follow up with the patient.         Medical Decision Making  The patient's presentation was of straightforward complexity (a clearly self-limited or minor problem).    The patient's evaluation involved:  history and exam without other MDM data elements    The patient's management necessitated moderate risk (prescription drug management including medications given in the ED).        NEW PRESCRIPTIONS STARTED AT TODAY'S ER VISIT  New Prescriptions    POLYMIXIN B-TRIMETHOPRIM (POLYTRIM) 78740-4.1 UNIT/ML-% OPHTHALMIC SOLUTION    Place 1-2 drops into the right eye every 4 hours for 5 days.       Final diagnoses:   Abrasion of right cornea, initial encounter       12/8/2024   [facility] EMERGENCY DEPT       [place], Thanh Duran MD  12/08/24 2429

## 2024-12-08 NOTE — ED TRIAGE NOTES
Unknown FB R eye     Triage Assessment (Adult)       Row Name 12/08/24 9216          Triage Assessment    Airway WDL WDL        Cognitive/Neuro/Behavioral WDL    Cognitive/Neuro/Behavioral WDL WDL

## 2024-12-09 ENCOUNTER — OFFICE VISIT (OUTPATIENT)
Dept: NEUROLOGY | Facility: CLINIC | Age: 69
End: 2024-12-09
Payer: COMMERCIAL

## 2024-12-09 DIAGNOSIS — M43.6 TORTICOLLIS: ICD-10-CM

## 2024-12-09 DIAGNOSIS — G24.3 CERVICAL DYSTONIA: Primary | ICD-10-CM

## 2024-12-09 NOTE — LETTER
12/9/2024      Dasia Orozco  [id number]  [facility] [place] [address]      Dear Colleague,    Thank you for referring your patient, Dasia Orozco, to the [facility]. Please see a copy of my visit note below.    Movement Disorders Botulinum Toxin Clinic Note    Chief Complaint: Cervical Dystonia    History of Present Illness:  Dasia Orozco is a 69 year old female who presents to clinic for botulinum toxin injections for treatment of cervical dystonia. Neurotoxin injections are first line gold standard treatment for focal dystonia.  Today is her third appointment at [place] for injections, although she has had them many years ago.       Response to Last Injection: 9/9/2024    Onset of Effect:  ~3wks    Benefit from last injections:  Improved turning and head tremor, felt she was able to read, use her phone, and drive more effectively and with less discomfort. This was a bit complicated by the head droop (see below)    Wearing Off: After ~1.5mo    Side effects: Again reporting neck/shoulder pain and difficulty keeping head up. This occurred with the last set of injections but last time occurred ~2wks after injections and this time occurred ~1mo after injections. Was using a Tylenol and cold pack to good effect. Unclear if this discomfort is related to Botox as the patient also has multiple degenerative changes in her neck per an MRI C-spine from 7/2024. The patient was seen in this clinic for this issue on 10/31/2024 during which time exam revealed notable weakness with holding her head up and decreased muscle tension in the R > L splenius with increased tension in the bilateral trapezius despite her prior injections having increased the Botox dose for the R trapezius. Still, her exam and timeline was suggestive of Botox-induced weakness with reactive stress on other muscles and so may consider either reducing her dosage of the splenius and trapezius  muscles or foregoing them altogether. A PT referral was also provided as was a prescription for Baclofen. Today, the patient reports that the head droop wore off around 1wk after her October appointment    Additional Interval History:   None    Current Outpatient Medications   Medication Sig Dispense Refill     acetaminophen (TYLENOL) 500 MG tablet        albuterol (PROVENTIL HFA: VENTOLIN HFA) 108 (90 BASE) MCG/ACT inhaler Inhale 2 puffs into the lungs every 4 hours as needed for shortness of breath / dyspnea or wheezing Prior to exercise 3 Inhaler 1     Ascorbic Acid (VITAMIN C PO) Take 1 tablet by mouth daily       baclofen (LIORESAL) 10 MG tablet Take 1 tablet (10 mg) by mouth 3 times daily as needed for muscle spasms. 90 tablet 3     diclofenac (VOLTAREN) 1 % topical gel Place 2 g onto the skin 4 times daily 1 Tube 11     Glucosamine HCl (GLUCOSAMINE PO) Take 1 Dose by mouth daily Liquid       ibuprofen (ADVIL/MOTRIN) 200 MG tablet Take 400 mg by mouth every 4 hours as needed        ipratropium - albuterol 0.5 mg/2.5 mg/3 mL (DUONEB) 0.5-2.5 (3) MG/3ML neb solution Inhale 3 mLs into the lungs       LORazepam (ATIVAN) 1 MG tablet Take 0.5 mg by mouth Up to 3 times daily. 4 tablet 0     meclizine (ANTIVERT) 25 MG tablet Take 0.5 tablets (12.5 mg) by mouth daily 90 tablet      Multiple Vitamin (MULTI-VITAMIN) per tablet Take 1 tablet by mouth daily. 100 tablet 3     omeprazole (PRILOSEC) 20 MG DR capsule TAKE 1 CAPSULE (20 MG) BY MOUTH DAILY 1 HOUR BEFORE A MEAL.       polymixin b-trimethoprim (POLYTRIM) 66729-4.1 UNIT/ML-% ophthalmic solution Place 1-2 drops into the right eye every 4 hours for 5 days. 10 mL 0     predniSONE (DELTASONE) 20 MG tablet Take two tablets (= 40mg) each day for 5 (five) days 10 tablet 0     probiotic CAPS Take 1 capsule by mouth every evening       QVAR REDIHALER 40 MCG/ACT inhaler INHALE 2 PUFFS (80 MCG) TWO TIMES A DAY.       tiotropium (SPIRIVA HANDIHALER) 18 MCG inhaled capsule  Inhale 1 capsule into the lungs daily Using handihaler device         Allergies: She is allergic to fluticasone-salmeterol, amoxicillin-pot clavulanate, doxycycline, fumaric acid, morphine, penicillins, and sulfa antibiotics.      Physical Examination:  Vital Signs:   vitals were not taken for this visit.   Mild L torticollis, R shoulder elevation  Extremely subtle head tremor    BOTULINUM NEUROTOXIN INJECTION PROCEDURES:    VERIFICATION OF PATIENT IDENTIFICATION AND PROCEDURE     Initials   Patient Name KG   Patient  KG   Procedure Verified by: KG     Above assessments performed by:  Dr. Lizeth Shaikh      INDICATION/S FOR PROCEDURE/S:  Dasia Orozco is a 69 year old patient with dystonia affecting the  head, neck and shoulder girdle musculature secondary to a diagnosis of cervical dystonia with associated  spasms, loss of volitional motor control, and difficulty with activities of daily living.     Her baseline symptoms have been recalcitrant to oral medications and conservative therapy.  She is here today for an injection of Botox.      GOAL OF PROCEDURE:  The goal of this procedure is to increase active range of motion, improve volitional motor control, and enhance functional independence associated with dystonic movements.    TOTAL DOSE ADMINISTERED:  Dose Administered:  50 units Botox    Diluent Used:  Preservative Free Normal Saline  Total Volume of Diluent Used:  1mL/100units    CONSENT:  The risks, benefits, and treatment options were discussed with Dasia Orozco and she agreed to proceed.      Written consent was obtained by MB    EQUIPMENT USED:  Needle-37mm stimulating/recording    SKIN PREPARATION:  Skin preparation was performed using an alcohol wipe.    GUIDANCE DESCRIPTION:  Electro-myographic guidance was necessary throughout the procedure to accurately identify all areas of dystonic muscles while avoiding injection of non-dystonic muscles, neighboring nerves and nearby  vascular structures.     AREA/MUSCLE INJECTED:    Visual display of locations injections are scanned into the chart under MEDIA tab.         Muscles Injected Units Injected Number of Injections   R splenius 0 (15) 0   R trapezius 0 (20, 10) 0   R levator  20 (10) 1   R SCM, clavicular head 10 (0) 1           L SCM, clavicular head + insertion point  10, 10 (15) 2   L trapezius  0 (10) 0           Total Units Injected: 50     Unavoidable Waste: 50     Total Units Billed 100      The patient tolerated the injections without difficulty.       Assessment:    Dasia Orozco is a 69 year old female with cervical dystonia. Today we did repeat botulinum toxin injections but this time avoided any of the posterior neck muscles and added on the L SCM    Plan  - Follow-up in 3 and 6 months' time to consider repeat injections    This patient was seen with Movement Disorder Specialist, Dr Shaikh, who agrees with the above plan    Ilsa Boyer MD  Movement Disorder Fellow      Attestation signed by Lizeth Shaikh MD at 12/9/2024 12:47 PM:  Ms. Rodolfo Orozco is a 69 year old woman with cervical dystonia presenting for repeat neurotoxin injections.  Unfortunately her last injections resulted in head droop, although minimal changes were actually made with that injection pattern.  Today I spared many of the neck extension muscles and instead injected head turning muscles and shoulder elevation.  Can consider adding in neck extensors at a lower dose if needed pending her response to today's injections.    Lizeth Shaikh MD    [facility]  Department of Neurology       Again, thank you for allowing me to participate in the care of your patient.        Sincerely,        Lizeth Shaikh MD

## 2025-02-11 ENCOUNTER — HOSPITAL ENCOUNTER (OUTPATIENT)
Dept: CT IMAGING | Facility: CLINIC | Age: 70
Discharge: HOME | End: 2025-02-11
Attending: INTERNAL MEDICINE
Payer: COMMERCIAL

## 2025-02-11 DIAGNOSIS — J44.9 CHRONIC OBSTRUCTIVE PULMONARY DISEASE, UNSPECIFIED COPD TYPE (H): ICD-10-CM

## 2025-02-11 PROCEDURE — 71250 CT THORAX DX C-: CPT

## 2025-02-14 PROBLEM — G24.3 CERVICAL DYSTONIA: Status: ACTIVE | Noted: 2025-02-14

## 2025-04-17 DIAGNOSIS — G24.3 CERVICAL DYSTONIA: Primary | ICD-10-CM

## 2025-05-08 ENCOUNTER — APPOINTMENT (OUTPATIENT)
Dept: GENERAL RADIOLOGY | Facility: CLINIC | Age: 70
End: 2025-05-08
Attending: STUDENT IN AN ORGANIZED HEALTH CARE EDUCATION/TRAINING PROGRAM
Payer: COMMERCIAL

## 2025-05-08 ENCOUNTER — HOSPITAL ENCOUNTER (EMERGENCY)
Facility: CLINIC | Age: 70
Discharge: HOME | End: 2025-05-08
Attending: STUDENT IN AN ORGANIZED HEALTH CARE EDUCATION/TRAINING PROGRAM
Payer: COMMERCIAL

## 2025-05-08 VITALS
RESPIRATION RATE: 20 BRPM | SYSTOLIC BLOOD PRESSURE: 150 MMHG | DIASTOLIC BLOOD PRESSURE: 92 MMHG | OXYGEN SATURATION: 96 % | BODY MASS INDEX: 23.05 KG/M2 | WEIGHT: 126 LBS | TEMPERATURE: 97.8 F | HEART RATE: 85 BPM

## 2025-05-08 DIAGNOSIS — J44.9 CHRONIC OBSTRUCTIVE PULMONARY DISEASE, UNSPECIFIED COPD TYPE (H): ICD-10-CM

## 2025-05-08 DIAGNOSIS — R06.02 SHORTNESS OF BREATH: ICD-10-CM

## 2025-05-08 LAB
ATRIAL RATE - MUSE: 75 BPM
DIASTOLIC BLOOD PRESSURE - MUSE: NORMAL MMHG
INTERPRETATION ECG - MUSE: NORMAL
P AXIS - MUSE: 65 DEGREES
PR INTERVAL - MUSE: 186 MS
QRS DURATION - MUSE: 100 MS
QT - MUSE: 402 MS
QTC - MUSE: 448 MS
R AXIS - MUSE: -26 DEGREES
SYSTOLIC BLOOD PRESSURE - MUSE: NORMAL MMHG
T AXIS - MUSE: 5 DEGREES
VENTRICULAR RATE- MUSE: 75 BPM

## 2025-05-08 PROCEDURE — 93005 ELECTROCARDIOGRAM TRACING: CPT | Performed by: STUDENT IN AN ORGANIZED HEALTH CARE EDUCATION/TRAINING PROGRAM

## 2025-05-08 PROCEDURE — 93010 ELECTROCARDIOGRAM REPORT: CPT | Performed by: STUDENT IN AN ORGANIZED HEALTH CARE EDUCATION/TRAINING PROGRAM

## 2025-05-08 PROCEDURE — 71046 X-RAY EXAM CHEST 2 VIEWS: CPT

## 2025-05-08 PROCEDURE — 99284 EMERGENCY DEPT VISIT MOD MDM: CPT | Mod: 25 | Performed by: STUDENT IN AN ORGANIZED HEALTH CARE EDUCATION/TRAINING PROGRAM

## 2025-05-08 PROCEDURE — 99284 EMERGENCY DEPT VISIT MOD MDM: CPT | Performed by: STUDENT IN AN ORGANIZED HEALTH CARE EDUCATION/TRAINING PROGRAM

## 2025-05-08 RX ORDER — AZITHROMYCIN 250 MG/1
TABLET, FILM COATED ORAL
Qty: 6 TABLET | Refills: 0 | Status: SHIPPED | OUTPATIENT
Start: 2025-05-08 | End: 2025-05-13

## 2025-05-08 ASSESSMENT — COLUMBIA-SUICIDE SEVERITY RATING SCALE - C-SSRS
2. HAVE YOU ACTUALLY HAD ANY THOUGHTS OF KILLING YOURSELF IN THE PAST MONTH?: NO
1. IN THE PAST MONTH, HAVE YOU WISHED YOU WERE DEAD OR WISHED YOU COULD GO TO SLEEP AND NOT WAKE UP?: NO
6. HAVE YOU EVER DONE ANYTHING, STARTED TO DO ANYTHING, OR PREPARED TO DO ANYTHING TO END YOUR LIFE?: NO

## 2025-05-08 ASSESSMENT — ACTIVITIES OF DAILY LIVING (ADL)
ADLS_ACUITY_SCORE: 41
ADLS_ACUITY_SCORE: 41

## 2025-05-09 ENCOUNTER — HOSPITAL ENCOUNTER (EMERGENCY)
Facility: CLINIC | Age: 70
Discharge: HOME | End: 2025-05-09
Attending: STUDENT IN AN ORGANIZED HEALTH CARE EDUCATION/TRAINING PROGRAM | Admitting: STUDENT IN AN ORGANIZED HEALTH CARE EDUCATION/TRAINING PROGRAM
Payer: COMMERCIAL

## 2025-05-09 VITALS
OXYGEN SATURATION: 90 % | DIASTOLIC BLOOD PRESSURE: 66 MMHG | BODY MASS INDEX: 22.32 KG/M2 | SYSTOLIC BLOOD PRESSURE: 112 MMHG | HEART RATE: 89 BPM | RESPIRATION RATE: 20 BRPM | WEIGHT: 126 LBS | HEIGHT: 63 IN | TEMPERATURE: 97.6 F

## 2025-05-09 DIAGNOSIS — J44.1 COPD EXACERBATION (H): ICD-10-CM

## 2025-05-09 LAB
ANION GAP SERPL CALCULATED.3IONS-SCNC: 14 MMOL/L (ref 7–15)
BASE EXCESS BLDV CALC-SCNC: 2.6 MMOL/L (ref -3–3)
BASOPHILS # BLD AUTO: 0.1 10E3/UL (ref 0–0.2)
BASOPHILS NFR BLD AUTO: 1 %
BUN SERPL-MCNC: 10.4 MG/DL (ref 8–23)
CALCIUM SERPL-MCNC: 8.8 MG/DL (ref 8.8–10.4)
CHLORIDE SERPL-SCNC: 101 MMOL/L (ref 98–107)
CREAT SERPL-MCNC: 0.57 MG/DL (ref 0.51–0.95)
D DIMER PPP FEU-MCNC: <0.27 UG/ML FEU (ref 0–0.5)
EGFRCR SERPLBLD CKD-EPI 2021: >90 ML/MIN/1.73M2
EOSINOPHIL # BLD AUTO: 0.1 10E3/UL (ref 0–0.7)
EOSINOPHIL NFR BLD AUTO: 1 %
ERYTHROCYTE [DISTWIDTH] IN BLOOD BY AUTOMATED COUNT: 12.6 % (ref 10–15)
GLUCOSE SERPL-MCNC: 108 MG/DL (ref 70–99)
HCO3 BLDV-SCNC: 27 MMOL/L (ref 21–28)
HCO3 SERPL-SCNC: 22 MMOL/L (ref 22–29)
HCT VFR BLD AUTO: 40.4 % (ref 35–47)
HGB BLD-MCNC: 13.3 G/DL (ref 11.7–15.7)
IMM GRANULOCYTES # BLD: 0 10E3/UL
IMM GRANULOCYTES NFR BLD: 0 %
LYMPHOCYTES # BLD AUTO: 3 10E3/UL (ref 0.8–5.3)
LYMPHOCYTES NFR BLD AUTO: 30 %
MCH RBC QN AUTO: 31 PG (ref 26.5–33)
MCHC RBC AUTO-ENTMCNC: 32.9 G/DL (ref 31.5–36.5)
MCV RBC AUTO: 94 FL (ref 78–100)
MONOCYTES # BLD AUTO: 0.8 10E3/UL (ref 0–1.3)
MONOCYTES NFR BLD AUTO: 8 %
NEUTROPHILS # BLD AUTO: 6.1 10E3/UL (ref 1.6–8.3)
NEUTROPHILS NFR BLD AUTO: 61 %
NRBC # BLD AUTO: 0 10E3/UL
NRBC BLD AUTO-RTO: 0 /100
NT-PROBNP SERPL-MCNC: <36 PG/ML (ref 0–353)
O2/TOTAL GAS SETTING VFR VENT: 21 %
OXYHGB MFR BLDV: 93 % (ref 70–75)
PCO2 BLDV: 38 MM HG (ref 40–50)
PH BLDV: 7.45 [PH] (ref 7.32–7.43)
PLATELET # BLD AUTO: 284 10E3/UL (ref 150–450)
PO2 BLDV: 54 MM HG (ref 25–47)
POTASSIUM SERPL-SCNC: 3.9 MMOL/L (ref 3.4–5.3)
RBC # BLD AUTO: 4.29 10E6/UL (ref 3.8–5.2)
SAO2 % BLDV: 94.3 % (ref 70–75)
SODIUM SERPL-SCNC: 137 MMOL/L (ref 135–145)
TROPONIN T SERPL HS-MCNC: 18 NG/L
TROPONIN T SERPL HS-MCNC: 22 NG/L
WBC # BLD AUTO: 10 10E3/UL (ref 4–11)

## 2025-05-09 PROCEDURE — 99284 EMERGENCY DEPT VISIT MOD MDM: CPT | Performed by: STUDENT IN AN ORGANIZED HEALTH CARE EDUCATION/TRAINING PROGRAM

## 2025-05-09 PROCEDURE — 84484 ASSAY OF TROPONIN QUANT: CPT | Performed by: STUDENT IN AN ORGANIZED HEALTH CARE EDUCATION/TRAINING PROGRAM

## 2025-05-09 PROCEDURE — 93010 ELECTROCARDIOGRAM REPORT: CPT | Performed by: STUDENT IN AN ORGANIZED HEALTH CARE EDUCATION/TRAINING PROGRAM

## 2025-05-09 PROCEDURE — 250N000012 HC RX MED GY IP 250 OP 636 PS 637: Performed by: STUDENT IN AN ORGANIZED HEALTH CARE EDUCATION/TRAINING PROGRAM

## 2025-05-09 PROCEDURE — 82805 BLOOD GASES W/O2 SATURATION: CPT | Performed by: STUDENT IN AN ORGANIZED HEALTH CARE EDUCATION/TRAINING PROGRAM

## 2025-05-09 PROCEDURE — 250N000009 HC RX 250: Performed by: STUDENT IN AN ORGANIZED HEALTH CARE EDUCATION/TRAINING PROGRAM

## 2025-05-09 PROCEDURE — 999N000157 HC STATISTIC RCP TIME EA 10 MIN

## 2025-05-09 PROCEDURE — 93005 ELECTROCARDIOGRAM TRACING: CPT

## 2025-05-09 PROCEDURE — 94640 AIRWAY INHALATION TREATMENT: CPT

## 2025-05-09 PROCEDURE — 36415 COLL VENOUS BLD VENIPUNCTURE: CPT | Performed by: STUDENT IN AN ORGANIZED HEALTH CARE EDUCATION/TRAINING PROGRAM

## 2025-05-09 PROCEDURE — 83880 ASSAY OF NATRIURETIC PEPTIDE: CPT | Performed by: STUDENT IN AN ORGANIZED HEALTH CARE EDUCATION/TRAINING PROGRAM

## 2025-05-09 PROCEDURE — 85379 FIBRIN DEGRADATION QUANT: CPT | Performed by: STUDENT IN AN ORGANIZED HEALTH CARE EDUCATION/TRAINING PROGRAM

## 2025-05-09 PROCEDURE — 85025 COMPLETE CBC W/AUTO DIFF WBC: CPT | Performed by: STUDENT IN AN ORGANIZED HEALTH CARE EDUCATION/TRAINING PROGRAM

## 2025-05-09 PROCEDURE — 99284 EMERGENCY DEPT VISIT MOD MDM: CPT | Mod: 25

## 2025-05-09 PROCEDURE — 82435 ASSAY OF BLOOD CHLORIDE: CPT | Performed by: STUDENT IN AN ORGANIZED HEALTH CARE EDUCATION/TRAINING PROGRAM

## 2025-05-09 RX ORDER — PREDNISONE 20 MG/1
40 TABLET ORAL DAILY
Qty: 10 TABLET | Refills: 0 | Status: SHIPPED | OUTPATIENT
Start: 2025-05-09

## 2025-05-09 RX ORDER — INHALER, ASSIST DEVICES
1 SPACER (EA) MISCELLANEOUS ONCE
Status: COMPLETED | OUTPATIENT
Start: 2025-05-09 | End: 2025-05-09

## 2025-05-09 RX ORDER — IPRATROPIUM BROMIDE AND ALBUTEROL SULFATE 2.5; .5 MG/3ML; MG/3ML
3 SOLUTION RESPIRATORY (INHALATION) ONCE
Status: COMPLETED | OUTPATIENT
Start: 2025-05-09 | End: 2025-05-09

## 2025-05-09 RX ORDER — PREDNISONE 20 MG/1
60 TABLET ORAL ONCE
Status: COMPLETED | OUTPATIENT
Start: 2025-05-09 | End: 2025-05-09

## 2025-05-09 RX ADMIN — Medication 1 EACH: at 06:04

## 2025-05-09 RX ADMIN — IPRATROPIUM BROMIDE AND ALBUTEROL SULFATE 3 ML: .5; 3 SOLUTION RESPIRATORY (INHALATION) at 03:59

## 2025-05-09 RX ADMIN — PREDNISONE 60 MG: 20 TABLET ORAL at 04:03

## 2025-05-09 ASSESSMENT — ACTIVITIES OF DAILY LIVING (ADL)
ADLS_ACUITY_SCORE: 41

## 2025-05-09 NOTE — ED PROVIDER NOTES
History     Chief Complaint   Patient presents with    Shortness of Breath     HPI  Dasia Orozco is a 69 year old female who has hypertension, anxiety, COPD, Ménière's disease, who presents to the ER for evaluation of shortness of breath.  I saw the patient earlier in my shift and discharged her.  She had a normal EKG and chest x-ray.  She was discharged with a Z-Macho.  She just completed a 5-day course of prednisone.  Last dose of prednisone was about 24 hours ago.    Patient presents about 4 hours after discharge with trouble breathing.  She states that she fell asleep about midnight and was doing fine until she woke up a few hours later feeling like she could not breathe.  She was lying flat.  She still feels a little short of breath but is feeling better.  She did not try an inhaler.  She feels like something is sitting on her chest.  She has a cough productive of discolored sputum.  No diarrhea, melena or hematochezia.  No nausea or vomiting.  No abdominal pain.  No fevers.    Allergies:  Allergies   Allergen Reactions    Fluticasone-Salmeterol Shortness Of Breath     Hypoxia      Amoxicillin-Pot Clavulanate Nausea and Vomiting     Vertigo    Doxycycline Headache and Nausea    Fumaric Acid Itching    Morphine Itching    Penicillins Other (See Comments)     vertigo     Sulfa Antibiotics Headache              Problem List:    Patient Active Problem List    Diagnosis Date Noted    Cervical dystonia 02/14/2025     Priority: Medium    Colon polyps 09/03/2019     Priority: Medium     Tubular adenoma and hyperplastic polyps      Pulmonary nodule 01/21/2015     Priority: Medium    COPD (chronic obstructive pulmonary disease) (H) 02/27/2013     Priority: Medium    Elevated cholesterol 11/27/2012     Priority: Medium    Gastric erosion 10/16/2008     Priority: Medium    Eczema 10/11/2007     Priority: Medium    Meniere's disease 09/07/2007     Priority: Medium    Anxiety 09/07/2007     Priority: Medium     Torticollis 2007     Priority: Medium        Past Medical History:    Past Medical History:   Diagnosis Date    COPD (chronic obstructive pulmonary disease) (H)     Meniere's disease        Past Surgical History:    Past Surgical History:   Procedure Laterality Date    CHOLECYSTECTOMY  2008    COLONOSCOPY N/A 2019    Procedure: COLONOSCOPY, WITH POLYPECTOMY AND BIOPSY;  Surgeon: Narciso Singh DO;  Location: [facility]    COLONOSCOPY N/A 2024    Procedure: Colonoscopy;  Surgeon: Enrique Kevin MD;  Location: [facility]       Family History:    Family History   Problem Relation Age of Onset    Respiratory Father         emphysema    Alzheimer Disease Maternal Grandmother     Cancer Maternal Grandfather         pancreatic cancer    Cancer Paternal Grandfather         lung    Gastrointestinal Disease Sister         diverticulis    Alzheimer Disease Mother        Social History:  Marital Status:  Single [1]  Social History     Tobacco Use    Smoking status: Former     Current packs/day: 0.00     Average packs/day: 0.5 packs/day for 40.0 years (20.0 ttl pk-yrs)     Types: Cigarettes     Start date: 1972     Quit date: 2012     Years since quittin.4    Smokeless tobacco: Never   Substance Use Topics    Alcohol use: Yes     Comment: very little    Drug use: No        Medications:    acetaminophen (TYLENOL) 500 MG tablet  albuterol (PROVENTIL HFA: VENTOLIN HFA) 108 (90 BASE) MCG/ACT inhaler  Ascorbic Acid (VITAMIN C PO)  azithromycin (ZITHROMAX) 250 MG tablet  baclofen (LIORESAL) 10 MG tablet  diclofenac (VOLTAREN) 1 % topical gel  Glucosamine HCl (GLUCOSAMINE PO)  ibuprofen (ADVIL/MOTRIN) 200 MG tablet  ipratropium - albuterol 0.5 mg/2.5 mg/3 mL (DUONEB) 0.5-2.5 (3) MG/3ML neb solution  LORazepam (ATIVAN) 1 MG tablet  meclizine (ANTIVERT) 25 MG tablet  Multiple Vitamin (MULTI-VITAMIN) per tablet  omeprazole (PRILOSEC) 20 MG DR capsule  predniSONE (DELTASONE) 20 MG  "tablet  probiotic CAPS  QVAR REDIHALER 40 MCG/ACT inhaler  tiotropium (SPIRIVA HANDIHALER) 18 MCG inhaled capsule          Review of Systems  See HPI  Physical Exam   BP: (!) 142/90  Pulse: 96  Temp: 97.6  F (36.4  C)  Resp: 20  Height: 160 cm (5' 3\")  Weight: 57.2 kg (126 lb)  SpO2: 92 %      Physical Exam  /67   Pulse 96   Temp 97.6  F (36.4  C) (Oral)   Resp 20   Ht 1.6 m (5' 3\")   Wt 57.2 kg (126 lb)   LMP 09/11/2001   SpO2 (!) 91%   BMI 22.32 kg/m    General: alert, interactive, in no apparent distress  Head: atraumatic  Nose: no rhinorrhea or epistaxis  Ears: no external auditory canal discharge or bleeding.    Eyes: Sclera nonicteric. Conjunctiva noninjected. PERRL, EOMI  Mouth: no tonsillar erythema, edema, or exudate.  Moist mucous membranes  Neck: supple, moving spontaneously no midline cervical tenderness  Lungs: No increased work of breathing.  Lungs are rhonchorous throughout, worse on the left.  There is mild expiratory wheezing bilaterally  CV: RRR, peripheral pulses palpable and symmetric  Abdomen: soft, nt, nd, no guarding or rebound.   Extremities: Warm and well-perfused.  No edema or calf tenderness bilaterally.  Skin: no rash or diaphoresis  Neuro: CN II-XII grossly intact, strength 5/5 in UE and LEs bilaterally, sensation intact to light touch in UE and LEs bilaterally;     ED Course        Procedures              EKG Interpretation:      Interpreted by Gino Akbar MD  Time reviewed: 5:19 AM    Symptoms at time of EKG: SOB   Rhythm: normal sinus   Rate: normal  Axis: normal  Ectopy: none  Conduction: normal  ST Segments/ T Waves: No ST-T wave changes  Q Waves: none  Comparison to prior: No longer having inverted T waves in lead III    Clinical Impression: normal EKG      Critical Care time:  none             Results for orders placed or performed during the hospital encounter of 05/09/25 (from the past 24 hours)   CBC with platelets differential    Narrative    The following " orders were created for panel order CBC with platelets differential.  Procedure                               Abnormality         Status                     ---------                               -----------         ------                     CBC with platelets and ...[[id number]]                      Final result                 Please view results for these tests on the individual orders.   Basic metabolic panel   Result Value Ref Range    Sodium 137 135 - 145 mmol/L    Potassium 3.9 3.4 - 5.3 mmol/L    Chloride 101 98 - 107 mmol/L    Carbon Dioxide (CO2) 22 22 - 29 mmol/L    Anion Gap 14 7 - 15 mmol/L    Urea Nitrogen 10.4 8.0 - 23.0 mg/dL    Creatinine 0.57 0.51 - 0.95 mg/dL    GFR Estimate >90 >60 mL/min/1.73m2    Calcium 8.8 8.8 - 10.4 mg/dL    Glucose 108 (H) 70 - 99 mg/dL   Troponin T, High Sensitivity   Result Value Ref Range    Troponin T, High Sensitivity 18 (H) <=14 ng/L   Blood gas venous   Result Value Ref Range    pH Venous 7.45 (H) 7.32 - 7.43    pCO2 Venous 38 (L) 40 - 50 mm Hg    pO2 Venous 54 (H) 25 - 47 mm Hg    Bicarbonate Venous 27 21 - 28 mmol/L    Base Excess/Deficit Venous 2.6 -3.0 - 3.0 mmol/L    FIO2 21     Oxyhemoglobin Venous 93 (H) 70 - 75 %    O2 Sat, Venous 94.3 (H) 70.0 - 75.0 %    Narrative    In healthy individuals, oxyhemoglobin (O2Hb) and oxygen saturation (SO2) are approximately equal. In the presence of dyshemoglobins, oxyhemoglobin can be considerably lower than oxygen saturation.   D dimer quantitative   Result Value Ref Range    D-Dimer Quantitative <0.27 0.00 - 0.50 ug/mL FEU    Narrative    This D-dimer assay is intended for use in conjunction with a clinical pretest probability assessment model to exclude pulmonary embolism (PE) and deep venous thrombosis (DVT) in outpatients suspected of PE or DVT. The cut-off value is 0.50 ug/mL FEU.    For patients 50 years of age or older, the application of age-adjusted cut-off values for D-Dimer may increase the specificity without  significant effect on sensitivity. The literature suggested calculation age adjusted cut-off in ug/L = age in years x 10 ug/L. The results in this laboratory are reported as ug/mL rather than ug/L. The calculation for age adjusted cut off in ug/mL= age in years x 0.01 ug/mL. For example, the cut off for a 76 year old male is 76 x 0.01 ug/mL = 0.76 ug/mL (760 ug/L).    M Shantelle et al. Age adjusted D-dimer cut-off levels to rule out pulmonary embolism: The ADJUST-PE Study. KIYA 2014;311:2474-9678.; HJ Anastasia et al. Diagnostic accuracy of conventional or age adjusted D-dimer cutoff values in older patients with suspected venous thromboembolism. Systemic review and meta-analysis. BMJ 2013:346:f2492.   NT-proBNP   Result Value Ref Range    NT-proBNP <36 0 - 353 pg/mL   CBC with platelets and differential   Result Value Ref Range    WBC Count 10.0 4.0 - 11.0 10e3/uL    RBC Count 4.29 3.80 - 5.20 10e6/uL    Hemoglobin 13.3 11.7 - 15.7 g/dL    Hematocrit 40.4 35.0 - 47.0 %    MCV 94 78 - 100 fL    MCH 31.0 26.5 - 33.0 pg    MCHC 32.9 31.5 - 36.5 g/dL    RDW 12.6 10.0 - 15.0 %    Platelet Count 284 150 - 450 10e3/uL    % Neutrophils 61 %    % Lymphocytes 30 %    % Monocytes 8 %    % Eosinophils 1 %    % Basophils 1 %    % Immature Granulocytes 0 %    NRBCs per 100 WBC 0 <1 /100    Absolute Neutrophils 6.1 1.6 - 8.3 10e3/uL    Absolute Lymphocytes 3.0 0.8 - 5.3 10e3/uL    Absolute Monocytes 0.8 0.0 - 1.3 10e3/uL    Absolute Eosinophils 0.1 0.0 - 0.7 10e3/uL    Absolute Basophils 0.1 0.0 - 0.2 10e3/uL    Absolute Immature Granulocytes 0.0 <=0.4 10e3/uL    Absolute NRBCs 0.0 10e3/uL   EKG 12-lead, tracing only   Result Value Ref Range    Systolic Blood Pressure  mmHg    Diastolic Blood Pressure  mmHg    Ventricular Rate 84 BPM    Atrial Rate 84 BPM    IA Interval 174 ms    QRS Duration 98 ms     ms    QTc 446 ms    P Axis 62 degrees    R AXIS -24 degrees    T Axis 62 degrees    Interpretation ECG       Sinus  rhythm  Normal ECG  When compared with ECG of 08-May-2025 20:49, (unconfirmed)  Nonspecific T wave abnormality has replaced inverted T waves in Inferior leads         Medications   ipratropium - albuterol 0.5 mg/2.5 mg/3 mL (DUONEB) neb solution 3 mL (3 mLs Nebulization $Given 5/9/25 8629)   predniSONE (DELTASONE) tablet 60 mg (60 mg Oral $Given 5/9/25 2488)       Assessments & Plan (with Medical Decision Making)     I have reviewed the nursing notes.    I have reviewed the findings, diagnosis, plan and need for follow up with the patient.          Medical Decision Making  Dasia Orozco is a 69 year old female who has hypertension, anxiety, COPD, Ménière's disease, who presents to the ER for evaluation of shortness of breath.  Vital signs reviewed and reassuring.  Patient is not in any respiratory distress.  However, she does appear to be having slightly more trouble breathing as compared to when I saw her earlier and she is now having wheezing which was not present earlier.  I still think that symptoms are likely due to COPD triggered by URI, but I am going to expand her workup to rule out other causes.  In the meantime, we will give prednisone and a DuoNeb.    Her EKG is reassuring.  She shows no evidence of respiratory acidosis.  She is actually slightly alkalotic.  Her BNP is negative reassuring against CHF.  D-dimer is negative, reassuring against PE.  BMP and CBC are reassuring.  Her troponin is slightly elevated at 18.  No previous values for comparison.  EKG is without acute ischemic changes.  I reassessed her and she is feeling a little bit better.  She was able to take a nap.  She does not have any chest pain.  I have a fairly low suspicion for ACS, but given that there are no previous values for comparison and she felt like something was sitting on her chest, I do want to repeat a 2-hour troponin.  Patient will be signed out to Dr. Garrido pending repeat troponin and reassessment.  If patient  continues to feel okay and her troponin is stable, which I anticipated will be, then I think outpatient management is appropriate.  I discussed with her that if she goes home I would recommend she take the Z-Macho as previously recommended and then I think we should prolong her prednisone for an additional 5 days.        New Prescriptions    No medications on file       Final diagnoses:   COPD exacerbation (H)       5/9/2025   [facility] EMERGENCY DEPT       Gino Akbar MD  05/09/25 [id number]

## 2025-05-09 NOTE — ED PROVIDER NOTES
Emergency Department Sign-out Note    Time of sign-out: 0600    Patient summary:   69F, hx COPD presenting with dyspnea.  Seen twice during previous shift, as she is feeling better after the first presentation but presented the second time due to worsening shortness of breath.  Did have worsening wheezing compared to initial visit and so more broad workup was pursued including D-dimer (negative), other labs (reassuring).  Given ipratropium-albuterol nebulizer and prednisone with improvement in symptoms.  She at 1 point did report some chest pain, initial troponin slightly elevated with nonischemic ECG, signed out pending second troponin.    Additional ED course:   0700  Second troponin slightly elevated from previous.  I reassessed patient, she is feeling much better, has been ambulatory unassisted, breathing comfortably, satting mid 90s on room air (the lower O2 readings documented were with patient laying on pulse oximeter, nurse reporting poor waveform with validated vitals at that time and consistently satting low 90s on room air with no respiratory distress).  Patient very much would like to go home.  At this point the elevated troponin probably is demand in the setting of COPD exacerbation, doubt this is related to an acute ischemic process particularly given negative ECGs x 2.  Recommended primary care follow-up in the next couple of weeks for follow-up of this.  ED return the event of new or worsening symptoms.  She expressed understanding of and agreement with this.  Discharged.    Deepak Desir MD  Staff Emergency Physician  [facility]       Jakob Desir MD  05/09/25 [id number]

## 2025-05-09 NOTE — DISCHARGE INSTRUCTIONS
I think your symptoms are due to a COPD exacerbation.  Your lab workup is all reassuring.  I have placed you on an additional 5-day course of prednisone.  Finish the Z-Macho that was prescribed earlier.  You can use your inhalers as needed.  Make sure you are using your spacer with the inhaler.  You can also use your neb as needed.  I placed an urgent referral to follow-up with your primary care doctor for recheck.  Return to the ER if you develop any new or severe symptoms.

## 2025-05-09 NOTE — ED PROVIDER NOTES
History     Chief Complaint   Patient presents with    Shortness of Breath     HPI  Dasia Orozco is a 69 year old female who has hypertension, anxiety, COPD, Ménière's disease, who presents to the ER for evaluation of shortness of breath.  The patient reports that she has had increased trouble breathing for the last 4 to 5 days.  She initially thought that it was due to a COPD exacerbation and she has prednisone on hand from her pulmonologist and she has been taking 40 mg for last 5 days which she just completed today.  However, she is not feeling any better so she came in for evaluation.  She is not oxygen dependent.  She feels sick and is worried she could have pneumonia or bronchitis.  She is coughing up greenish sputum.  Coughing more than usual.  No chest pain.  No fevers.  No nausea or vomiting.  She is eating and drinking well.  No orthopnea.  No abdominal pain.  No diarrhea, melena or hematochezia.  No pain or swelling in her lower extremities.  Patient has not smoked for 13 years.    Allergies:  Allergies   Allergen Reactions    Fluticasone-Salmeterol Shortness Of Breath     Hypoxia      Amoxicillin-Pot Clavulanate Nausea and Vomiting     Vertigo    Doxycycline Headache and Nausea    Fumaric Acid Itching    Morphine Itching    Penicillins Other (See Comments)     vertigo     Sulfa Antibiotics Headache              Problem List:    Patient Active Problem List    Diagnosis Date Noted    Cervical dystonia 02/14/2025     Priority: Medium    Colon polyps 09/03/2019     Priority: Medium     Tubular adenoma and hyperplastic polyps      Pulmonary nodule 01/21/2015     Priority: Medium    COPD (chronic obstructive pulmonary disease) (H) 02/27/2013     Priority: Medium    Elevated cholesterol 11/27/2012     Priority: Medium    Gastric erosion 10/16/2008     Priority: Medium    Eczema 10/11/2007     Priority: Medium    Meniere's disease 09/07/2007     Priority: Medium    Anxiety 09/07/2007     Priority:  Medium    Torticollis 2007     Priority: Medium        Past Medical History:    Past Medical History:   Diagnosis Date    COPD (chronic obstructive pulmonary disease) (H)     Meniere's disease        Past Surgical History:    Past Surgical History:   Procedure Laterality Date    CHOLECYSTECTOMY  2008    COLONOSCOPY N/A 2019    Procedure: COLONOSCOPY, WITH POLYPECTOMY AND BIOPSY;  Surgeon: Narciso Singh DO;  Location: [facility]    COLONOSCOPY N/A 2024    Procedure: Colonoscopy;  Surgeon: Enrique Kevin MD;  Location: [facility]       Family History:    Family History   Problem Relation Age of Onset    Respiratory Father         emphysema    Alzheimer Disease Maternal Grandmother     Cancer Maternal Grandfather         pancreatic cancer    Cancer Paternal Grandfather         lung    Gastrointestinal Disease Sister         diverticulis    Alzheimer Disease Mother        Social History:  Marital Status:  Single [1]  Social History     Tobacco Use    Smoking status: Former     Current packs/day: 0.00     Average packs/day: 0.5 packs/day for 40.0 years (20.0 ttl pk-yrs)     Types: Cigarettes     Start date: 1972     Quit date: 2012     Years since quittin.4    Smokeless tobacco: Never   Substance Use Topics    Alcohol use: Yes     Comment: very little    Drug use: No        Medications:    acetaminophen (TYLENOL) 500 MG tablet  albuterol (PROVENTIL HFA: VENTOLIN HFA) 108 (90 BASE) MCG/ACT inhaler  Ascorbic Acid (VITAMIN C PO)  azithromycin (ZITHROMAX) 250 MG tablet  baclofen (LIORESAL) 10 MG tablet  diclofenac (VOLTAREN) 1 % topical gel  Glucosamine HCl (GLUCOSAMINE PO)  ibuprofen (ADVIL/MOTRIN) 200 MG tablet  ipratropium - albuterol 0.5 mg/2.5 mg/3 mL (DUONEB) 0.5-2.5 (3) MG/3ML neb solution  LORazepam (ATIVAN) 1 MG tablet  meclizine (ANTIVERT) 25 MG tablet  Multiple Vitamin (MULTI-VITAMIN) per tablet  omeprazole (PRILOSEC) 20 MG DR capsule  predniSONE (DELTASONE) 20 MG  tablet  probiotic CAPS  QVAR REDIHALER 40 MCG/ACT inhaler  tiotropium (SPIRIVA HANDIHALER) 18 MCG inhaled capsule          Review of Systems  See HPI  Physical Exam   BP: (!) 164/90  Pulse: 80  Temp: 97.8  F (36.6  C)  Resp: 20  Weight: 57.2 kg (126 lb)  SpO2: 96 %      Physical Exam  BP (!) 150/92   Pulse 85   Temp 97.8  F (36.6  C) (Tympanic)   Resp 20   Wt 57.2 kg (126 lb)   LMP 09/11/2001   SpO2 96%   BMI 23.05 kg/m    General: alert, interactive, in no apparent distress  Head: atraumatic  Nose: no rhinorrhea or epistaxis  Ears: no external auditory canal discharge or bleeding.    Eyes: Sclera nonicteric. Conjunctiva noninjected. PERRL, EOMI  Mouth: no tonsillar erythema, edema, or exudate.  Moist mucous membranes  Neck: supple, moving spontaneously no midline cervical tenderness  Lungs: No increased work of breathing.  Clear to auscultation bilaterally.  No wheezing.  Rhonchi noted, worse on the left  CV: RRR, peripheral pulses palpable and symmetric  Abdomen: soft, nt, nd, no guarding or rebound.   Extremities: Warm and well-perfused.  No edema or calf tenderness bilaterally.  Skin: no rash or diaphoresis  Neuro: CN II-XII grossly intact, strength 5/5 in UE and LEs bilaterally, sensation intact to light touch in UE and LEs bilaterally;   ED Course        Procedures              EKG Interpretation:      Interpreted by Gino Akbar MD  Time reviewed: 10:19 PM    Symptoms at time of EKG: Shortness of breath  Rhythm: normal sinus   Rate: Normal  Axis: Normal  Ectopy: none  Conduction: normal  ST Segments/ T Waves: T wave inversion III  Q Waves: none  Comparison to prior: Unchanged    Clinical Impression: no acute changes            Critical Care time:  none             Results for orders placed or performed during the hospital encounter of 05/08/25 (from the past 24 hours)   EKG 12-lead, tracing only   Result Value Ref Range    Systolic Blood Pressure  mmHg    Diastolic Blood Pressure  mmHg    Ventricular  Rate 75 BPM    Atrial Rate 75 BPM    IA Interval 186 ms    QRS Duration 100 ms     ms    QTc 448 ms    P Axis 65 degrees    R AXIS -26 degrees    T Axis 5 degrees    Interpretation ECG       Sinus rhythm  Normal ECG  When compared with ECG of 12-Jun-2007 06:54,  QRS duration has increased  T wave inversion now evident in Inferior leads     XR Chest 2 Views    Narrative    EXAM: XR CHEST 2 VIEWS  LOCATION: [facility]  DATE: 5/8/2025    INDICATION: Shortness of breath and increased cough  COMPARISON: CT chest 2/11/2025.      Impression    IMPRESSION: Chronic scarring in the right upper lobe. Mild scarring/atelectasis at the lung bases. Small groundglass nodule in the right upper lobe on the prior CT chest study not well seen by radiographic technique. Top normal heart size. Normal   pulmonary vascularity. No pleural effusion or pneumothorax.       Medications - No data to display    Assessments & Plan (with Medical Decision Making)     I have reviewed the nursing notes.    I have reviewed the findings, diagnosis, plan and need for follow up with the patient.          Medical Decision Making  Dasia Orozco is a 69 year old female who has hypertension, anxiety, COPD, Ménière's disease, who presents to the ER for evaluation of shortness of breath.  Vital signs reviewed notable for mild hypertension otherwise reassuring.  Patient is not in any respiratory distress.  She is speaking in full sentences, has normal oxygen saturation.  She is not having any wheezing to suggest COPD exacerbation.  EKG is reassuring.  She is not having any chest pain and I have a low suspicion for ACS.  Chest x-ray obtained and independently reviewed by me and radiology report reviewed.  Patient has some atelectasis at the bases, but no pneumothorax or acute infiltrate.  When I reassessed the patient, she continued to appear well with normal oxygen saturations.  No evidence of volume overload on exam to  suggest CHF.  I suspect that she has a URI making her symptoms worse.  Possible that she could have bronchitis.  Think outpatient management is appropriate.  I am going to have her start a Z-Macho given her COPD.  We discussed prolonging her steroids, but given that she is not wheezing and just completed a 5-day course of prednisone, we have opted to hold off on steroids and see how she does with just the antibiotics.  She has a follow-up CT scan on Monday for surveillance of a pulmonary nodule which she is aware of.  I recommend she follow-up closely with her regular doctor next week for recheck.  Additional reassurance anticipatory guidance discussed.  Return precautions discussed as well.        Discharge Medication List as of 5/8/2025 10:55 PM        START taking these medications    Details   azithromycin (ZITHROMAX) 250 MG tablet Take 2 tablets (500 mg) by mouth daily for 1 day, THEN 1 tablet (250 mg) daily for 4 days., Disp-6 tablet, R-0, E-Prescribe             Final diagnoses:   Shortness of breath   Chronic obstructive pulmonary disease, unspecified COPD type (H)       5/8/2025   [facility] EMERGENCY DEPT       Gino Akbar MD  05/08/25 [id number]

## 2025-05-09 NOTE — DISCHARGE INSTRUCTIONS
Your chest x-ray did not show any evidence of pneumonia.  Your oxygen levels were normal and your lungs were not wheezing.  I think that your symptoms could be due to bronchitis and they are being worsened by your COPD.  I recommend that you start taking a Z-Macho tonight.  We discussed trying more steroids, but since you just completed your course, we decided to hold off.  Follow-up with your primary doctor early next week for recheck.  Make sure you go to your CT scan on Monday for surveillance of your pulmonary nodule.  Return to the ER if you develop worsening trouble breathing or any other concerning symptoms.

## 2025-05-09 NOTE — ED TRIAGE NOTES
Pt presents with concerns of worsening SOB that began last Sunday. Pt just finished a few days of prednisone and just used ventolin inhaler 15 minutes prior to arrival for her COPD. Pt also with complaints of upper chest wall pain from coughing.      Triage Assessment (Adult)       Row Name 05/08/25 2038          Triage Assessment    Airway WDL WDL        Respiratory WDL    Respiratory WDL X;rhythm/pattern;cough     Rhythm/Pattern, Respiratory dyspnea upon exertion;pattern regular;depth regular;shortness of breath     Cough Frequency infrequent     Cough Type assisted        Skin Circulation/Temperature WDL    Skin Circulation/Temperature WDL WDL        Cardiac WDL    Cardiac WDL WDL        Peripheral/Neurovascular WDL    Peripheral Neurovascular WDL WDL        Cognitive/Neuro/Behavioral WDL    Cognitive/Neuro/Behavioral WDL WDL

## 2025-05-09 NOTE — ED NOTES
Pt reports feeling much better, SOB improved, pt updated on labs and plan of care. MD updated. Pt feels ready to discharge home

## 2025-05-10 LAB
ATRIAL RATE - MUSE: 75 BPM
ATRIAL RATE - MUSE: 84 BPM
ATRIAL RATE - MUSE: 92 BPM
DIASTOLIC BLOOD PRESSURE - MUSE: NORMAL MMHG
INTERPRETATION ECG - MUSE: NORMAL
P AXIS - MUSE: 62 DEGREES
P AXIS - MUSE: 65 DEGREES
P AXIS - MUSE: 71 DEGREES
PR INTERVAL - MUSE: 174 MS
PR INTERVAL - MUSE: 180 MS
PR INTERVAL - MUSE: 186 MS
QRS DURATION - MUSE: 100 MS
QRS DURATION - MUSE: 102 MS
QRS DURATION - MUSE: 98 MS
QT - MUSE: 368 MS
QT - MUSE: 378 MS
QT - MUSE: 402 MS
QTC - MUSE: 446 MS
QTC - MUSE: 448 MS
QTC - MUSE: 455 MS
R AXIS - MUSE: -24 DEGREES
R AXIS - MUSE: -26 DEGREES
R AXIS - MUSE: -31 DEGREES
SYSTOLIC BLOOD PRESSURE - MUSE: NORMAL MMHG
T AXIS - MUSE: 5 DEGREES
T AXIS - MUSE: 62 DEGREES
T AXIS - MUSE: 76 DEGREES
VENTRICULAR RATE- MUSE: 75 BPM
VENTRICULAR RATE- MUSE: 84 BPM
VENTRICULAR RATE- MUSE: 92 BPM

## 2025-05-12 ENCOUNTER — HOSPITAL ENCOUNTER (OUTPATIENT)
Dept: CT IMAGING | Facility: CLINIC | Age: 70
Discharge: HOME | End: 2025-05-12
Attending: INTERNAL MEDICINE | Admitting: INTERNAL MEDICINE
Payer: COMMERCIAL

## 2025-05-12 DIAGNOSIS — R91.1 LUNG NODULE: ICD-10-CM

## 2025-05-12 PROCEDURE — 71250 CT THORAX DX C-: CPT

## 2025-08-16 ENCOUNTER — HEALTH MAINTENANCE LETTER (OUTPATIENT)
Age: 70
End: 2025-08-16

## (undated) RX ORDER — LIDOCAINE HYDROCHLORIDE 10 MG/ML
INJECTION, SOLUTION EPIDURAL; INFILTRATION; INTRACAUDAL; PERINEURAL
Status: DISPENSED
Start: 2019-08-16

## (undated) RX ORDER — LIDOCAINE HYDROCHLORIDE 10 MG/ML
INJECTION, SOLUTION EPIDURAL; INFILTRATION; INTRACAUDAL; PERINEURAL
Status: DISPENSED
Start: 2024-09-27

## (undated) RX ORDER — PROPOFOL 10 MG/ML
INJECTION, EMULSION INTRAVENOUS
Status: DISPENSED
Start: 2019-08-16